# Patient Record
Sex: MALE | Race: WHITE | NOT HISPANIC OR LATINO | Employment: OTHER | URBAN - METROPOLITAN AREA
[De-identification: names, ages, dates, MRNs, and addresses within clinical notes are randomized per-mention and may not be internally consistent; named-entity substitution may affect disease eponyms.]

---

## 2018-02-08 ENCOUNTER — HOSPITAL ENCOUNTER (OUTPATIENT)
Dept: FAMILY MEDICINE CLINIC | Facility: CLINIC | Age: 48
Discharge: HOME | End: 2018-02-08
Attending: NURSE PRACTITIONER | Admitting: NURSE PRACTITIONER

## 2018-03-30 ENCOUNTER — HOSPITAL ENCOUNTER (OUTPATIENT)
Dept: FAMILY MEDICINE CLINIC | Facility: CLINIC | Age: 48
Discharge: HOME | End: 2018-03-30
Attending: NURSE PRACTITIONER | Admitting: NURSE PRACTITIONER

## 2018-05-18 ENCOUNTER — HOSPITAL ENCOUNTER (OUTPATIENT)
Dept: FAMILY MEDICINE CLINIC | Facility: CLINIC | Age: 48
Discharge: HOME | End: 2018-05-18
Attending: NURSE PRACTITIONER | Admitting: NURSE PRACTITIONER

## 2018-07-25 ENCOUNTER — HOSPITAL ENCOUNTER (OUTPATIENT)
Dept: SLEEP MEDICINE | Facility: HOSPITAL | Age: 48
Discharge: HOME | End: 2018-07-25
Attending: INTERNAL MEDICINE | Admitting: INTERNAL MEDICINE

## 2018-09-04 ENCOUNTER — HOSPITAL ENCOUNTER (OUTPATIENT)
Dept: FAMILY MEDICINE CLINIC | Facility: CLINIC | Age: 48
Discharge: HOME | End: 2018-09-04
Attending: NURSE PRACTITIONER | Admitting: NURSE PRACTITIONER

## 2019-06-24 RX ORDER — ROSUVASTATIN CALCIUM 20 MG/1
20 TABLET, COATED ORAL
Qty: 30 TABLET | Refills: 2 | Status: SHIPPED | OUTPATIENT
Start: 2019-06-24 | End: 2019-09-23 | Stop reason: SDUPTHER

## 2019-06-24 RX ORDER — ROSUVASTATIN CALCIUM 20 MG/1
1 TABLET, COATED ORAL
COMMUNITY
Start: 2018-08-07 | End: 2019-06-24 | Stop reason: SDUPTHER

## 2019-07-02 ENCOUNTER — OFFICE VISIT (OUTPATIENT)
Dept: PULMONOLOGY | Facility: HOSPITAL | Age: 49
End: 2019-07-02

## 2019-07-02 VITALS
HEIGHT: 73 IN | RESPIRATION RATE: 12 BRPM | DIASTOLIC BLOOD PRESSURE: 69 MMHG | BODY MASS INDEX: 41.75 KG/M2 | WEIGHT: 315 LBS | SYSTOLIC BLOOD PRESSURE: 111 MMHG | TEMPERATURE: 98.5 F | HEART RATE: 62 BPM | OXYGEN SATURATION: 93 %

## 2019-07-02 DIAGNOSIS — R06.00 DYSPNEA, UNSPECIFIED TYPE: Primary | ICD-10-CM

## 2019-07-02 DIAGNOSIS — G47.33 OSA (OBSTRUCTIVE SLEEP APNEA): ICD-10-CM

## 2019-07-02 DIAGNOSIS — Q24.5 CORONARY-MYOCARDIAL BRIDGE: ICD-10-CM

## 2019-07-02 PROCEDURE — G0463 HOSPITAL OUTPT CLINIC VISIT: HCPCS

## 2019-07-02 RX ORDER — ISOSORBIDE MONONITRATE 30 MG/1
30 TABLET, EXTENDED RELEASE ORAL EVERY MORNING
Refills: 5 | COMMUNITY
Start: 2019-06-07 | End: 2019-09-23 | Stop reason: SDUPTHER

## 2019-07-02 RX ORDER — COVID-19 ANTIGEN TEST
KIT MISCELLANEOUS
COMMUNITY
Start: 2018-03-20 | End: 2020-04-29

## 2019-07-02 RX ORDER — TRAZODONE HYDROCHLORIDE 50 MG/1
TABLET ORAL
Refills: 4 | COMMUNITY
Start: 2019-06-21 | End: 2019-09-23 | Stop reason: SDUPTHER

## 2019-07-02 RX ORDER — PROPRANOLOL HYDROCHLORIDE 80 MG/1
80 TABLET ORAL 2 TIMES DAILY
Refills: 2 | COMMUNITY
Start: 2019-06-21 | End: 2019-08-19 | Stop reason: SDUPTHER

## 2019-07-02 RX ORDER — CHOLECALCIFEROL (VITAMIN D3) 125 MCG
5 CAPSULE ORAL NIGHTLY PRN
COMMUNITY
End: 2019-09-23 | Stop reason: SDUPTHER

## 2019-07-02 RX ORDER — ALBUTEROL SULFATE 90 UG/1
AEROSOL, METERED RESPIRATORY (INHALATION)
Refills: 5 | COMMUNITY
Start: 2019-05-02 | End: 2019-12-16

## 2019-07-02 RX ORDER — NITROGLYCERIN 0.4 MG/1
TABLET SUBLINGUAL
COMMUNITY
Start: 2012-09-19 | End: 2021-01-06 | Stop reason: SDUPTHER

## 2019-07-02 NOTE — ASSESSMENT & PLAN NOTE
Diagnosed in 2018 via heart cath per Dr. Yang.  Medical management.  Pt with increased SOA and chest pain with decreased exercise tolerance and heart palpitations.  Would like cardiac clearance prior to PFT testing and advised need cardiac re-evaluation as it has been over a year since last visit.  Concern that cardiac issues are main source of dyspnea and less likely pulmonary related.

## 2019-07-02 NOTE — ASSESSMENT & PLAN NOTE
"Pt with known history of GUILLERMINA, currently untreated.    PSG 7/2018 with AHI of  39.6 with lowest oxygen level of 74%, sustained hypoxia <88% of 56.5 minutes.    Pt titrated and prescribed auto bipap.  Not wearing as he feels it caused increased sinus pressure and chest infections.      Discussed with patient untreated sleep apnea in combination with sustained hypoxia, could be cause of early am dyspnea and chest pain.      Pt request referral to ENT to \" fix sinus issues\" to better tolerate PAP therapy.   " No Yes

## 2019-07-02 NOTE — ASSESSMENT & PLAN NOTE
Likely multifactorial.  Pt with 10-20 year history of SOA.  Worse with exertion, and low exercise intolerance.  Discussed with patient myocardial bridge, obesity, untreated sleep apnea, sinus crush injury  could be contributing factors.  Will recheck CXR and refer to ENT for evaluation.
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
## 2019-07-03 ENCOUNTER — TELEPHONE (OUTPATIENT)
Dept: CARDIOLOGY | Facility: CLINIC | Age: 49
End: 2019-07-03

## 2019-07-03 NOTE — TELEPHONE ENCOUNTER
[facility] FOR CORONARY MYOCARDIAL BRIDGE. DR. QUIÑONES WOULD YOU BE WILLING TO SEE PATIENT IN OFFICE SOON?

## 2019-07-11 PROBLEM — Z13.29 SCREENING FOR THYROID DISORDER: Status: ACTIVE | Noted: 2018-02-08

## 2019-07-11 PROBLEM — R53.83 FATIGUE: Status: ACTIVE | Noted: 2018-04-24

## 2019-07-11 PROBLEM — J06.9 ACUTE UPPER RESPIRATORY INFECTION: Status: ACTIVE | Noted: 2018-03-30

## 2019-07-11 PROBLEM — Z12.5 ENCOUNTER FOR SCREENING FOR MALIGNANT NEOPLASM OF PROSTATE: Status: ACTIVE | Noted: 2018-02-08

## 2019-07-11 PROBLEM — H61.23 BILATERAL IMPACTED CERUMEN: Status: ACTIVE | Noted: 2019-02-22

## 2019-07-11 PROBLEM — E66.01 MORBID (SEVERE) OBESITY DUE TO EXCESS CALORIES (HCC): Status: ACTIVE | Noted: 2018-06-12

## 2019-07-11 PROBLEM — E66.9 OBESITY: Status: ACTIVE | Noted: 2019-07-11

## 2019-07-11 PROBLEM — Z72.89 OTHER PROBLEMS RELATED TO LIFESTYLE: Status: ACTIVE | Noted: 2018-04-24

## 2019-07-11 PROBLEM — M25.531 PAIN IN RIGHT WRIST: Status: ACTIVE | Noted: 2018-05-18

## 2019-07-11 PROBLEM — G47.00 INSOMNIA: Status: ACTIVE | Noted: 2018-04-24

## 2019-07-11 PROBLEM — E78.5 HYPERLIPIDEMIA: Status: ACTIVE | Noted: 2019-07-11

## 2019-07-11 PROBLEM — J30.9 ALLERGIC RHINITIS: Status: ACTIVE | Noted: 2018-11-06

## 2019-07-11 PROBLEM — IMO0002 HYPOGONADISM: Status: ACTIVE | Noted: 2018-06-13

## 2019-07-11 PROBLEM — R05.9 COUGH: Status: ACTIVE | Noted: 2018-09-04

## 2019-07-11 PROBLEM — R73.01 FASTING HYPERGLYCEMIA: Status: ACTIVE | Noted: 2018-02-08

## 2019-07-11 PROBLEM — H66.92 ACUTE LEFT OTITIS MEDIA: Status: ACTIVE | Noted: 2019-02-22

## 2019-07-16 ENCOUNTER — OFFICE VISIT (OUTPATIENT)
Dept: CARDIOLOGY | Facility: CLINIC | Age: 49
End: 2019-07-16

## 2019-07-16 VITALS
DIASTOLIC BLOOD PRESSURE: 79 MMHG | BODY MASS INDEX: 41.75 KG/M2 | SYSTOLIC BLOOD PRESSURE: 113 MMHG | WEIGHT: 315 LBS | HEIGHT: 73 IN

## 2019-07-16 DIAGNOSIS — I10 ESSENTIAL HYPERTENSION: ICD-10-CM

## 2019-07-16 DIAGNOSIS — I25.118 CORONARY ARTERY DISEASE OF NATIVE ARTERY OF NATIVE HEART WITH STABLE ANGINA PECTORIS (HCC): Primary | ICD-10-CM

## 2019-07-16 DIAGNOSIS — G47.33 OBSTRUCTIVE SLEEP APNEA SYNDROME: ICD-10-CM

## 2019-07-16 DIAGNOSIS — E78.00 PURE HYPERCHOLESTEROLEMIA: ICD-10-CM

## 2019-07-16 DIAGNOSIS — I20.8 STABLE ANGINA PECTORIS (HCC): ICD-10-CM

## 2019-07-16 PROCEDURE — 93000 ELECTROCARDIOGRAM COMPLETE: CPT | Performed by: INTERNAL MEDICINE

## 2019-07-16 PROCEDURE — 99213 OFFICE O/P EST LOW 20 MIN: CPT | Performed by: INTERNAL MEDICINE

## 2019-07-16 NOTE — PROGRESS NOTES
"    Subjective:     Encounter Date:07/16/2019      Patient ID: Ang Yoon is a 49 y.o. male.    Chief Complaint: FRANCIS & chest Pains  History of Present Illness 51-year-old white male with history of coronary disease hypertension hyperlipidemia and obstructive sleep apnea with pulmonary hypertension presents to my office for follow-up.  Patient is currently having occasional episodes of chest pain or shortness of breath especially with exertion and relieved with rest.  No complaints of any PND orthopnea.  No palpitations dizziness syncope.  He has some swelling of the feet.  He is taking his medicines regularly.  He is not able to exercise or ambulate very well because of his symptoms.  He does not use a CPAP machine.  Not smoke.    The following portions of the patient's history were reviewed and updated as appropriate: allergies, current medications, past family history, past medical history, past social history, past surgical history and problem list.  Past Medical History:   Diagnosis Date   • Arthritis    • CAD (coronary artery disease)    • Dyspnea on exertion 07/16/2019   • Hyperlipidemia    • Hypertension    • Obesity    • Sleep apnea, obstructive      Past Surgical History:   Procedure Laterality Date   • CARDIAC CATHETERIZATION  04/2018   • KNEE ARTHROSCOPY Right 07/09/2012     /79 (BP Location: Left arm, Patient Position: Sitting, Cuff Size: Adult)   Ht 185.4 cm (73\")   Wt (!) 164 kg (361 lb)   BMI 47.63 kg/m²   Family History   Problem Relation Age of Onset   • Heart disease Other    • Hypertension Other    • Stroke Other    • Prostate cancer Other    • Asthma Brother    • Asthma Daughter        Current Outpatient Medications:   •  albuterol sulfate  (90 Base) MCG/ACT inhaler, , Disp: , Rfl: 5  •  isosorbide mononitrate (IMDUR) 30 MG 24 hr tablet, Take 30 mg by mouth Every Morning., Disp: , Rfl: 5  •  melatonin 5 MG tablet tablet, Take 5 mg by mouth At Night As Needed., Disp: , " Rfl:   •  Naproxen Sodium (ALEVE) 220 MG capsule, ALEVE 220 MG CAPS, Disp: , Rfl:   •  nitroglycerin (NITROSTAT) 0.4 MG SL tablet, NITROSTAT 0.4 MG SUBL, Disp: , Rfl:   •  propranolol (INDERAL) 80 MG tablet, Take 80 mg by mouth 2 (Two) Times a Day., Disp: , Rfl: 2  •  rosuvastatin (CRESTOR) 20 MG tablet, Take 1 tablet by mouth every night at bedtime., Disp: 30 tablet, Rfl: 2  •  traZODone (DESYREL) 50 MG tablet, TAKE 1 TO 2 TABLETS BY MOUTH ONCE DAILY AT BEDTIME AS NEEDED, Disp: , Rfl: 4  Allergies   Allergen Reactions   • Niacin Rash     Social History     Socioeconomic History   • Marital status: Single     Spouse name: Not on file   • Number of children: Not on file   • Years of education: Not on file   • Highest education level: Not on file   Tobacco Use   • Smoking status: Former Smoker     Packs/day: 0.25     Types: Cigarettes   • Smokeless tobacco: Never Used   Substance and Sexual Activity   • Alcohol use: No     Frequency: Never   • Drug use: No   • Sexual activity: Defer     Review of Systems   Constitution: Positive for malaise/fatigue. Negative for fever.   HENT: Negative for congestion and hearing loss.    Eyes: Negative for double vision and visual disturbance.   Cardiovascular: Positive for chest pain and dyspnea on exertion. Negative for claudication, leg swelling (Right mainly), palpitations and syncope.   Respiratory: Negative for cough and shortness of breath.    Endocrine: Negative for cold intolerance.   Skin: Negative for color change and rash.   Musculoskeletal: Negative for arthritis and joint pain.   Gastrointestinal: Negative for abdominal pain and heartburn.   Genitourinary: Negative for hematuria.   Neurological: Negative for excessive daytime sleepiness, dizziness and light-headedness.   Psychiatric/Behavioral: Negative for depression. The patient is not nervous/anxious.    All other systems reviewed and are negative.             Objective:     Physical Exam   Constitutional: He appears  well-developed and well-nourished.   HENT:   Head: Normocephalic and atraumatic.   Eyes: Conjunctivae and EOM are normal. Pupils are equal, round, and reactive to light. No scleral icterus.   Neck: Normal range of motion. Neck supple. No JVD present. Carotid bruit is not present.   Cardiovascular: Normal rate, regular rhythm, S1 normal, S2 normal, normal heart sounds and intact distal pulses. PMI is not displaced.   Pulmonary/Chest: Effort normal and breath sounds normal. He has no wheezes. He has no rales.   Abdominal: Soft. Bowel sounds are normal.   Musculoskeletal: Normal range of motion.   Neurological: He is alert. He has normal strength.   No focal deficits   Skin: Skin is warm and dry. No rash noted.   Psychiatric: He has a normal mood and affect.       ECG 12 Lead  Date/Time: 7/16/2019 11:26 AM  Performed by: Sher Yang MD  Authorized by: Sher Yang MD   Comments: With nonspecific ST segment normality and old inferior infarct            Lab Review:       Assessment:          Diagnosis Plan   1. Coronary artery disease of native artery of native heart with stable angina pectoris (CMS/HCC)     2. Essential hypertension     3. Pure hypercholesterolemia     4. Obstructive sleep apnea syndrome     5. Stable angina pectoris (CMS/HCC)            Plan:       Patient is having symptoms of chest pain or shortness of breath and hence I will perform an echocardiogram and stress my study to rule out ischemia.  Patient has sleep apnea but does not use a CPAP machine.  Blood pressure and heart rate are stable per  Patient's lipid levels are followed by the primary care doctor.  Discussed with patient about treatment plan

## 2019-08-05 ENCOUNTER — TELEPHONE (OUTPATIENT)
Dept: CARDIOLOGY | Facility: CLINIC | Age: 49
End: 2019-08-05

## 2019-08-05 DIAGNOSIS — R06.02 SHORTNESS OF BREATH: ICD-10-CM

## 2019-08-05 DIAGNOSIS — R07.9 CHEST PAIN, UNSPECIFIED TYPE: Primary | ICD-10-CM

## 2019-08-05 NOTE — TELEPHONE ENCOUNTER
Pt was originally scheduled for maddie and echo in [place] tomorrow but called the  office to make appt there due to owing [facility] money. Order placed.

## 2019-08-15 ENCOUNTER — HOSPITAL ENCOUNTER (OUTPATIENT)
Dept: CARDIOLOGY | Facility: HOSPITAL | Age: 49
Discharge: HOME | End: 2019-08-15

## 2019-08-15 ENCOUNTER — APPOINTMENT (OUTPATIENT)
Dept: CARDIOLOGY | Facility: HOSPITAL | Age: 49
End: 2019-08-15

## 2019-08-15 DIAGNOSIS — R06.02 SHORTNESS OF BREATH: ICD-10-CM

## 2019-08-15 DIAGNOSIS — R07.9 CHEST PAIN, UNSPECIFIED TYPE: ICD-10-CM

## 2019-08-15 LAB
BH CV STRESS COMMENTS STAGE 1: NORMAL
BH CV STRESS DOSE REGADENOSON STAGE 1: 0.4
BH CV STRESS DURATION MIN STAGE 1: 0
BH CV STRESS DURATION SEC STAGE 1: 10
BH CV STRESS PROTOCOL 1: NORMAL
BH CV STRESS RECOVERY BP: NORMAL MMHG
BH CV STRESS RECOVERY HR: 72 BPM
BH CV STRESS STAGE 1: 1
LV EF NUC BP: 51 %
MAXIMAL PREDICTED HEART RATE: 170 BPM
STRESS BASELINE BP: NORMAL MMHG
STRESS BASELINE HR: 61 BPM
STRESS TARGET HR: 145 BPM

## 2019-08-15 PROCEDURE — 0 TECHNETIUM SESTAMIBI: Performed by: INTERNAL MEDICINE

## 2019-08-15 PROCEDURE — 93018 CV STRESS TEST I&R ONLY: CPT | Performed by: INTERNAL MEDICINE

## 2019-08-15 PROCEDURE — A9500 TC99M SESTAMIBI: HCPCS | Performed by: INTERNAL MEDICINE

## 2019-08-15 PROCEDURE — 93017 CV STRESS TEST TRACING ONLY: CPT

## 2019-08-15 PROCEDURE — 78452 HT MUSCLE IMAGE SPECT MULT: CPT | Performed by: INTERNAL MEDICINE

## 2019-08-15 PROCEDURE — 78452 HT MUSCLE IMAGE SPECT MULT: CPT

## 2019-08-15 PROCEDURE — 25010000002 REGADENOSON 0.4 MG/5ML SOLUTION: Performed by: INTERNAL MEDICINE

## 2019-08-15 PROCEDURE — 93016 CV STRESS TEST SUPVJ ONLY: CPT | Performed by: INTERNAL MEDICINE

## 2019-08-15 RX ADMIN — TECHNETIUM TC 99M SESTAMIBI 1 DOSE: 1 INJECTION INTRAVENOUS at 12:00

## 2019-08-15 RX ADMIN — REGADENOSON 0.4 MG: 0.08 INJECTION, SOLUTION INTRAVENOUS at 12:00

## 2019-08-15 RX ADMIN — TECHNETIUM TC 99M SESTAMIBI 1 DOSE: 1 INJECTION INTRAVENOUS at 09:30

## 2019-08-19 RX ORDER — PROPRANOLOL HYDROCHLORIDE 80 MG/1
80 TABLET ORAL 2 TIMES DAILY
Qty: 60 TABLET | Refills: 5 | Status: SHIPPED | OUTPATIENT
Start: 2019-08-19 | End: 2019-09-23 | Stop reason: SDUPTHER

## 2019-08-27 ENCOUNTER — TELEPHONE (OUTPATIENT)
Dept: CARDIOLOGY | Facility: CLINIC | Age: 49
End: 2019-08-27

## 2019-08-27 NOTE — TELEPHONE ENCOUNTER
----- Message from Blayne Agrawal Rep sent at 8/23/2019 11:00 AM EDT -----  Regarding: ECHO ORDER  There is an open order for an echo on this patient still. In the notes Slime galeas wanted his CHARLA done first before approving an echo. He had his CHARLA done the 15th. Based on the results of it does Celia want her to submit those to adal to try to get the echo done to or can he discontinue the order for the echo?

## 2019-09-23 ENCOUNTER — OFFICE VISIT (OUTPATIENT)
Dept: FAMILY MEDICINE CLINIC | Facility: CLINIC | Age: 49
End: 2019-09-23

## 2019-09-23 VITALS
OXYGEN SATURATION: 94 % | HEART RATE: 73 BPM | WEIGHT: 315 LBS | BODY MASS INDEX: 42.66 KG/M2 | HEIGHT: 72 IN | TEMPERATURE: 98 F | DIASTOLIC BLOOD PRESSURE: 72 MMHG | SYSTOLIC BLOOD PRESSURE: 115 MMHG

## 2019-09-23 DIAGNOSIS — M54.32 SCIATICA OF LEFT SIDE: ICD-10-CM

## 2019-09-23 DIAGNOSIS — E78.2 MIXED HYPERLIPIDEMIA: Primary | ICD-10-CM

## 2019-09-23 DIAGNOSIS — Z00.00 PREVENTATIVE HEALTH CARE: ICD-10-CM

## 2019-09-23 DIAGNOSIS — M54.50 LUMBAR PAIN: ICD-10-CM

## 2019-09-23 DIAGNOSIS — M25.552 LEFT HIP PAIN: ICD-10-CM

## 2019-09-23 DIAGNOSIS — R25.2 LEG CRAMPS: ICD-10-CM

## 2019-09-23 PROCEDURE — 99214 OFFICE O/P EST MOD 30 MIN: CPT | Performed by: NURSE PRACTITIONER

## 2019-09-23 RX ORDER — ISOSORBIDE MONONITRATE 30 MG/1
30 TABLET, EXTENDED RELEASE ORAL EVERY MORNING
Qty: 90 TABLET | Refills: 2 | Status: SHIPPED | OUTPATIENT
Start: 2019-09-23 | End: 2020-07-06

## 2019-09-23 RX ORDER — CYCLOBENZAPRINE HCL 10 MG
10 TABLET ORAL NIGHTLY PRN
Qty: 30 TABLET | Refills: 2 | Status: SHIPPED | OUTPATIENT
Start: 2019-09-23 | End: 2019-12-16

## 2019-09-23 RX ORDER — PREDNISONE 20 MG/1
TABLET ORAL
Qty: 20 TABLET | Refills: 0 | Status: SHIPPED | OUTPATIENT
Start: 2019-09-23 | End: 2019-10-01

## 2019-09-23 RX ORDER — TRAZODONE HYDROCHLORIDE 50 MG/1
TABLET ORAL
Qty: 180 TABLET | Refills: 2 | Status: SHIPPED | OUTPATIENT
Start: 2019-09-23 | End: 2020-12-23

## 2019-09-23 RX ORDER — ASPIRIN 81 MG/1
81 TABLET ORAL DAILY
Qty: 90 TABLET | Refills: 2 | Status: SHIPPED | OUTPATIENT
Start: 2019-09-23 | End: 2020-12-23

## 2019-09-23 RX ORDER — CHOLECALCIFEROL (VITAMIN D3) 125 MCG
5 CAPSULE ORAL NIGHTLY PRN
Qty: 90 TABLET | Refills: 2 | Status: SHIPPED | OUTPATIENT
Start: 2019-09-23

## 2019-09-23 RX ORDER — PROPRANOLOL HYDROCHLORIDE 80 MG/1
80 TABLET ORAL 2 TIMES DAILY
Qty: 180 TABLET | Refills: 2 | Status: SHIPPED | OUTPATIENT
Start: 2019-09-23 | End: 2020-10-26

## 2019-09-23 RX ORDER — ASPIRIN 81 MG/1
81 TABLET ORAL DAILY
COMMUNITY
End: 2019-09-23 | Stop reason: SDUPTHER

## 2019-09-23 RX ORDER — ROSUVASTATIN CALCIUM 20 MG/1
20 TABLET, COATED ORAL
Qty: 30 TABLET | Refills: 2 | Status: SHIPPED | OUTPATIENT
Start: 2019-09-23 | End: 2020-01-02

## 2019-09-23 RX ORDER — GABAPENTIN 300 MG/1
CAPSULE ORAL
Qty: 60 CAPSULE | Refills: 2 | Status: SHIPPED | OUTPATIENT
Start: 2019-09-23 | End: 2020-06-10 | Stop reason: SDUPTHER

## 2019-09-23 NOTE — PROGRESS NOTES
Subjective   Ang Yoon is a 49 y.o. male.     49-year-old obese white male history hypertension, CABG/CAD, myocardial bridging syndrome, sleep apnea, hyperlipidemia, insomnia, headaches, anxiety and chronic knee pain comes in today 3-week history of low back pain that is now moving the left hip and to left calf after doing some lifting.  Patient will start inflammatory muscle relaxers and getting some x-rays to see if we can get this cleared up without therapy.   patient had recent cardiac studies done with his cardiologist for chest pain   blood pressure today 114/72 heart rate 72 he denies any chest pain, dyspnea, tachycardia, dizziness or edema   weight is 365     lumbar/left hip x-rays   magnesium 400 mg twice a day   prednisone 20 mg taper dose   Flexeril 10 mg three times a day   Aleve twice a day x1 week   Ice frequently and stretching exercises         The following portions of the patient's history were reviewed and updated as appropriate: allergies, current medications, past family history, past medical history, past social history, past surgical history and problem list.    Review of Systems   HENT: Negative.    Respiratory: Negative.    Cardiovascular: Negative.    Gastrointestinal: Negative.    Genitourinary: Negative.    Musculoskeletal: Positive for back pain.        Low back pain that has now moved into left hip and now he has left calf cramping   Psychiatric/Behavioral: Negative.        Objective   Physical Exam   Constitutional: He is oriented to person, place, and time. He appears well-developed and well-nourished.   Cardiovascular: Normal rate and regular rhythm.   Pulmonary/Chest: Effort normal.   Abdominal: Soft.   Musculoskeletal:   Left-sided low back pain after lifting with  Radiculopathy in left hip and left calf   Neurological: He is oriented to person, place, and time.   Skin: Skin is warm and dry.   Psychiatric: He has a normal mood and affect.         Assessment/Plan   Ang  was seen today for leg pain and hip pain.    Diagnoses and all orders for this visit:    Mixed hyperlipidemia  -     Lipid Panel With LDL / HDL Ratio    Preventative health care  -     CBC (No Diff)  -     Comprehensive Metabolic Panel    Left hip pain  -     XR Hip With or Without Pelvis 2 - 3 View Left    Lumbar pain  -     XR Spine Lumbar 2 or 3 View    Sciatica of left side    Leg cramps    Other orders  -     cyclobenzaprine (FLEXERIL) 10 MG tablet; Take 1 tablet by mouth At Night As Needed for Muscle Spasms.  -     predniSONE (DELTASONE) 20 MG tablet; Take 4 tablets by mouth Daily for 2 days, THEN 3 tablets Daily for 2 days, THEN 2 tablets Daily for 2 days, THEN 1 tablet Daily for 2 days.  -     aspirin 81 MG EC tablet; Take 1 tablet by mouth Daily.  -     isosorbide mononitrate (IMDUR) 30 MG 24 hr tablet; Take 1 tablet by mouth Every Morning.  -     melatonin 5 MG tablet tablet; Take 1 tablet by mouth At Night As Needed (insomnia).  -     propranolol (INDERAL) 80 MG tablet; Take 1 tablet by mouth 2 (Two) Times a Day.  -     rosuvastatin (CRESTOR) 20 MG tablet; Take 1 tablet by mouth every night at bedtime.  -     traZODone (DESYREL) 50 MG tablet; 1-2 tabs qhs prn  -     gabapentin (NEURONTIN) 300 MG capsule; 1-2 capsules 30 minutes before bedtime

## 2019-09-25 LAB
ALBUMIN SERPL-MCNC: 4.3 G/DL (ref 3.5–5.5)
ALBUMIN/GLOB SERPL: 1.8 {RATIO} (ref 1.2–2.2)
ALP SERPL-CCNC: 64 IU/L (ref 39–117)
ALT SERPL-CCNC: 21 IU/L (ref 0–44)
AST SERPL-CCNC: 25 IU/L (ref 0–40)
BILIRUB SERPL-MCNC: 0.6 MG/DL (ref 0–1.2)
BUN SERPL-MCNC: 16 MG/DL (ref 6–24)
BUN/CREAT SERPL: 20 (ref 9–20)
CALCIUM SERPL-MCNC: 9.2 MG/DL (ref 8.7–10.2)
CHLORIDE SERPL-SCNC: 102 MMOL/L (ref 96–106)
CHOLEST SERPL-MCNC: 129 MG/DL (ref 100–199)
CO2 SERPL-SCNC: 26 MMOL/L (ref 20–29)
CREAT SERPL-MCNC: 0.82 MG/DL (ref 0.76–1.27)
ERYTHROCYTE [DISTWIDTH] IN BLOOD BY AUTOMATED COUNT: 14 % (ref 12.3–15.4)
GLOBULIN SER CALC-MCNC: 2.4 G/DL (ref 1.5–4.5)
GLUCOSE SERPL-MCNC: 122 MG/DL (ref 65–99)
HCT VFR BLD AUTO: 46.7 % (ref 37.5–51)
HDLC SERPL-MCNC: 39 MG/DL
HGB BLD-MCNC: 15.2 G/DL (ref 13–17.7)
LDLC SERPL CALC-MCNC: 49 MG/DL (ref 0–99)
LDLC/HDLC SERPL: 1.3 RATIO (ref 0–3.6)
MCH RBC QN AUTO: 31.2 PG (ref 26.6–33)
MCHC RBC AUTO-ENTMCNC: 32.5 G/DL (ref 31.5–35.7)
MCV RBC AUTO: 96 FL (ref 79–97)
PLATELET # BLD AUTO: 182 X10E3/UL (ref 150–450)
POTASSIUM SERPL-SCNC: 5.2 MMOL/L (ref 3.5–5.2)
PROT SERPL-MCNC: 6.7 G/DL (ref 6–8.5)
RBC # BLD AUTO: 4.87 X10E6/UL (ref 4.14–5.8)
SODIUM SERPL-SCNC: 141 MMOL/L (ref 134–144)
TRIGL SERPL-MCNC: 207 MG/DL (ref 0–149)
VLDLC SERPL CALC-MCNC: 41 MG/DL (ref 5–40)
WBC # BLD AUTO: 7.1 X10E3/UL (ref 3.4–10.8)

## 2019-10-08 ENCOUNTER — TELEPHONE (OUTPATIENT)
Dept: CARDIOLOGY | Facility: CLINIC | Age: 49
End: 2019-10-08

## 2019-10-23 ENCOUNTER — OFFICE VISIT (OUTPATIENT)
Dept: FAMILY MEDICINE CLINIC | Facility: CLINIC | Age: 49
End: 2019-10-23

## 2019-10-23 VITALS
HEIGHT: 72 IN | BODY MASS INDEX: 42.66 KG/M2 | DIASTOLIC BLOOD PRESSURE: 81 MMHG | HEART RATE: 65 BPM | SYSTOLIC BLOOD PRESSURE: 129 MMHG | OXYGEN SATURATION: 93 % | RESPIRATION RATE: 22 BRPM | TEMPERATURE: 98.6 F | WEIGHT: 315 LBS

## 2019-10-23 DIAGNOSIS — J20.9 ACUTE BRONCHITIS, UNSPECIFIED ORGANISM: Primary | ICD-10-CM

## 2019-10-23 PROCEDURE — 99213 OFFICE O/P EST LOW 20 MIN: CPT | Performed by: FAMILY MEDICINE

## 2019-10-23 RX ORDER — GUAIFENESIN 600 MG/1
600 TABLET, EXTENDED RELEASE ORAL 2 TIMES DAILY
Qty: 14 TABLET | Refills: 0 | Status: SHIPPED | OUTPATIENT
Start: 2019-10-23 | End: 2019-12-16

## 2019-10-23 RX ORDER — LEVOFLOXACIN 500 MG/1
500 TABLET, FILM COATED ORAL DAILY
Qty: 7 TABLET | Refills: 0 | Status: SHIPPED | OUTPATIENT
Start: 2019-10-23 | End: 2019-11-01

## 2019-10-23 RX ORDER — PREDNISONE 20 MG/1
40 TABLET ORAL DAILY
Qty: 10 TABLET | Refills: 0 | Status: SHIPPED | OUTPATIENT
Start: 2019-10-23 | End: 2019-10-28

## 2019-10-30 ENCOUNTER — TELEPHONE (OUTPATIENT)
Dept: FAMILY MEDICINE CLINIC | Facility: CLINIC | Age: 49
End: 2019-10-30

## 2019-10-30 DIAGNOSIS — R06.02 SHORTNESS OF BREATH: Primary | ICD-10-CM

## 2019-10-30 DIAGNOSIS — R05.9 COUGH: ICD-10-CM

## 2019-10-30 NOTE — TELEPHONE ENCOUNTER
Patient called into office and left a voicemail stating that he has finished the Prednisone as well as the ABTx and the Bronchitis seems to be coming back even stronger than before and wants to know what you would for him to do. Thanks.

## 2019-10-30 NOTE — TELEPHONE ENCOUNTER
Patient notified. Understanding voiced. Will go to [facility].

## 2019-10-30 NOTE — TELEPHONE ENCOUNTER
Please send in order for anterior chest x-ray PA and lateral.  Diagnosis is cough.  He can do this at [place] or [facility], his choice.  Thanks

## 2019-10-31 ENCOUNTER — TELEPHONE (OUTPATIENT)
Dept: FAMILY MEDICINE CLINIC | Facility: CLINIC | Age: 49
End: 2019-10-31

## 2019-10-31 ENCOUNTER — HOSPITAL ENCOUNTER (OUTPATIENT)
Dept: GENERAL RADIOLOGY | Facility: HOSPITAL | Age: 49
Discharge: HOME | End: 2019-10-31
Admitting: FAMILY MEDICINE

## 2019-10-31 DIAGNOSIS — R06.02 SHORTNESS OF BREATH: ICD-10-CM

## 2019-10-31 DIAGNOSIS — R05.9 COUGH: ICD-10-CM

## 2019-10-31 PROCEDURE — 71046 X-RAY EXAM CHEST 2 VIEWS: CPT

## 2019-10-31 NOTE — TELEPHONE ENCOUNTER
S/w patient again at this time. Reminded patient that Dr. CAMILO Bass is out of office and that he needs to go to UC or ER if he feels that it is worthy. Patient voiced understanding and stated that he would decide within the next few hours.

## 2019-10-31 NOTE — TELEPHONE ENCOUNTER
Patient called said he went and got an xray that  had ordered, Patient said he is starting to cough up blood in the mornings said its bright red- he also said this goes away within a few hours but the coughing is still happening. He is wanting to know he should do please contact pt

## 2019-10-31 NOTE — TELEPHONE ENCOUNTER
----- Message from Traci Bass MD sent at 10/31/2019 12:48 PM EDT -----  Please tell patient that his chest x-ray was normal.  There is no pneumonia.  Since he is still feeling badly, asked him if he can come in tomorrow for reevaluation.  If hhe can, get him on the schedule and go ahead and do an influenza swab when he comes in.  Thanks

## 2019-10-31 NOTE — TELEPHONE ENCOUNTER
Called & s/w patient. Notified him of the XR results and is willing to come into office tomorrow for re-evaluation. Transferred to  for scheduling.

## 2019-11-01 ENCOUNTER — OFFICE VISIT (OUTPATIENT)
Dept: FAMILY MEDICINE CLINIC | Facility: CLINIC | Age: 49
End: 2019-11-01

## 2019-11-01 VITALS
WEIGHT: 315 LBS | TEMPERATURE: 97.4 F | DIASTOLIC BLOOD PRESSURE: 78 MMHG | SYSTOLIC BLOOD PRESSURE: 131 MMHG | HEIGHT: 72 IN | BODY MASS INDEX: 42.66 KG/M2 | RESPIRATION RATE: 22 BRPM

## 2019-11-01 DIAGNOSIS — R06.02 SHORTNESS OF BREATH: ICD-10-CM

## 2019-11-01 DIAGNOSIS — J45.41 MODERATE PERSISTENT REACTIVE AIRWAY DISEASE WITH ACUTE EXACERBATION: ICD-10-CM

## 2019-11-01 DIAGNOSIS — R05.9 COUGH: Primary | ICD-10-CM

## 2019-11-01 PROBLEM — J45.901 REACTIVE AIRWAY DISEASE WITH ACUTE EXACERBATION: Status: ACTIVE | Noted: 2019-11-01

## 2019-11-01 LAB
EXPIRATION DATE: NORMAL
FLUAV AG NPH QL: NEGATIVE
FLUBV AG NPH QL: NEGATIVE
INTERNAL CONTROL: NORMAL
Lab: NORMAL

## 2019-11-01 PROCEDURE — 87804 INFLUENZA ASSAY W/OPTIC: CPT | Performed by: FAMILY MEDICINE

## 2019-11-01 PROCEDURE — 99214 OFFICE O/P EST MOD 30 MIN: CPT | Performed by: FAMILY MEDICINE

## 2019-11-01 RX ORDER — ALBUTEROL SULFATE 90 UG/1
2 AEROSOL, METERED RESPIRATORY (INHALATION) EVERY 4 HOURS PRN
Qty: 1 INHALER | Refills: 2 | Status: SHIPPED | OUTPATIENT
Start: 2019-11-01 | End: 2020-12-23

## 2019-11-01 RX ORDER — FLUTICASONE PROPIONATE 50 MCG
2 SPRAY, SUSPENSION (ML) NASAL DAILY
Qty: 1 BOTTLE | Refills: 2 | Status: SHIPPED | OUTPATIENT
Start: 2019-11-01 | End: 2020-03-23 | Stop reason: SDUPTHER
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
## 2019-11-19 ENCOUNTER — HOSPITAL ENCOUNTER (OUTPATIENT)
Dept: RESPIRATORY THERAPY | Facility: HOSPITAL | Age: 49
End: 2019-11-19

## 2019-12-05 ENCOUNTER — HOSPITAL ENCOUNTER (OUTPATIENT)
Dept: RESPIRATORY THERAPY | Facility: HOSPITAL | Age: 49
Discharge: HOME | End: 2019-12-05
Admitting: FAMILY MEDICINE

## 2019-12-05 DIAGNOSIS — J45.41 MODERATE PERSISTENT REACTIVE AIRWAY DISEASE WITH ACUTE EXACERBATION: ICD-10-CM

## 2019-12-05 DIAGNOSIS — R06.02 SHORTNESS OF BREATH: ICD-10-CM

## 2019-12-05 PROCEDURE — 94060 EVALUATION OF WHEEZING: CPT

## 2019-12-05 PROCEDURE — 94726 PLETHYSMOGRAPHY LUNG VOLUMES: CPT

## 2019-12-05 PROCEDURE — 94729 DIFFUSING CAPACITY: CPT

## 2019-12-05 RX ORDER — ALBUTEROL SULFATE 2.5 MG/3ML
2.5 SOLUTION RESPIRATORY (INHALATION) ONCE
Status: COMPLETED | OUTPATIENT
Start: 2019-12-05 | End: 2019-12-05

## 2019-12-05 RX ADMIN — ALBUTEROL SULFATE 2.5 MG: 2.5 SOLUTION RESPIRATORY (INHALATION) at 13:29

## 2019-12-16 ENCOUNTER — OFFICE VISIT (OUTPATIENT)
Dept: FAMILY MEDICINE CLINIC | Facility: CLINIC | Age: 49
End: 2019-12-16

## 2019-12-16 VITALS
WEIGHT: 315 LBS | TEMPERATURE: 98.4 F | HEART RATE: 65 BPM | RESPIRATION RATE: 20 BRPM | OXYGEN SATURATION: 95 % | SYSTOLIC BLOOD PRESSURE: 128 MMHG | DIASTOLIC BLOOD PRESSURE: 85 MMHG | HEIGHT: 72 IN | BODY MASS INDEX: 42.66 KG/M2

## 2019-12-16 DIAGNOSIS — Z23 NEED FOR PROPHYLACTIC VACCINATION AND INOCULATION AGAINST INFLUENZA: ICD-10-CM

## 2019-12-16 DIAGNOSIS — S39.012S BACK STRAIN, SEQUELA: ICD-10-CM

## 2019-12-16 DIAGNOSIS — R05.9 COUGH: ICD-10-CM

## 2019-12-16 DIAGNOSIS — I51.9 CARDIAC DISEASE: ICD-10-CM

## 2019-12-16 DIAGNOSIS — J45.41 MODERATE PERSISTENT REACTIVE AIRWAY DISEASE WITH ACUTE EXACERBATION: Primary | ICD-10-CM

## 2019-12-16 DIAGNOSIS — Q24.5 CORONARY-MYOCARDIAL BRIDGE: ICD-10-CM

## 2019-12-16 PROBLEM — Z13.29 SCREENING FOR THYROID DISORDER: Status: RESOLVED | Noted: 2018-02-08 | Resolved: 2019-12-16

## 2019-12-16 PROBLEM — Z12.5 ENCOUNTER FOR SCREENING FOR MALIGNANT NEOPLASM OF PROSTATE: Status: RESOLVED | Noted: 2018-02-08 | Resolved: 2019-12-16

## 2019-12-16 PROBLEM — H66.92 ACUTE LEFT OTITIS MEDIA: Status: RESOLVED | Noted: 2019-02-22 | Resolved: 2019-12-16

## 2019-12-16 PROBLEM — J06.9 ACUTE UPPER RESPIRATORY INFECTION: Status: RESOLVED | Noted: 2018-03-30 | Resolved: 2019-12-16

## 2019-12-16 PROBLEM — G47.00 INSOMNIA: Status: RESOLVED | Noted: 2018-04-24 | Resolved: 2019-12-16

## 2019-12-16 PROBLEM — R53.83 FATIGUE: Status: RESOLVED | Noted: 2018-04-24 | Resolved: 2019-12-16

## 2019-12-16 PROBLEM — R73.01 FASTING HYPERGLYCEMIA: Status: RESOLVED | Noted: 2018-02-08 | Resolved: 2019-12-16

## 2019-12-16 PROBLEM — J30.9 ALLERGIC RHINITIS: Status: RESOLVED | Noted: 2018-11-06 | Resolved: 2019-12-16

## 2019-12-16 PROBLEM — H61.23 BILATERAL IMPACTED CERUMEN: Status: RESOLVED | Noted: 2019-02-22 | Resolved: 2019-12-16

## 2019-12-16 PROBLEM — M25.531 PAIN IN RIGHT WRIST: Status: RESOLVED | Noted: 2018-05-18 | Resolved: 2019-12-16

## 2019-12-16 PROCEDURE — 90674 CCIIV4 VAC NO PRSV 0.5 ML IM: CPT | Performed by: FAMILY MEDICINE

## 2019-12-16 PROCEDURE — 90471 IMMUNIZATION ADMIN: CPT | Performed by: FAMILY MEDICINE

## 2019-12-16 PROCEDURE — 99214 OFFICE O/P EST MOD 30 MIN: CPT | Performed by: FAMILY MEDICINE
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
## 2020-01-01 DIAGNOSIS — E78.2 MIXED HYPERLIPIDEMIA: Primary | ICD-10-CM

## 2020-01-02 RX ORDER — ROSUVASTATIN CALCIUM 20 MG/1
TABLET, COATED ORAL
Qty: 30 TABLET | Refills: 5 | Status: SHIPPED | OUTPATIENT
Start: 2020-01-02 | End: 2020-07-20

## 2020-01-28 ENCOUNTER — CLINICAL SUPPORT (OUTPATIENT)
Dept: FAMILY MEDICINE CLINIC | Facility: CLINIC | Age: 50
End: 2020-01-28

## 2020-01-28 DIAGNOSIS — Z23 NEED FOR DIPHTHERIA-TETANUS-PERTUSSIS (TDAP) VACCINE: Primary | ICD-10-CM

## 2020-01-28 PROCEDURE — 90471 IMMUNIZATION ADMIN: CPT | Performed by: FAMILY MEDICINE

## 2020-01-28 PROCEDURE — 90715 TDAP VACCINE 7 YRS/> IM: CPT | Performed by: FAMILY MEDICINE

## 2020-03-23 DIAGNOSIS — J45.41 MODERATE PERSISTENT REACTIVE AIRWAY DISEASE WITH ACUTE EXACERBATION: ICD-10-CM

## 2020-03-23 DIAGNOSIS — R06.02 SHORTNESS OF BREATH: ICD-10-CM

## 2020-03-23 RX ORDER — FLUTICASONE PROPIONATE 50 MCG
2 SPRAY, SUSPENSION (ML) NASAL DAILY
Qty: 1 BOTTLE | Refills: 2 | Status: SHIPPED | OUTPATIENT
Start: 2020-03-23 | End: 2022-07-07 | Stop reason: SDUPTHER

## 2020-04-29 ENCOUNTER — TELEMEDICINE (OUTPATIENT)
Dept: FAMILY MEDICINE CLINIC | Facility: CLINIC | Age: 50
End: 2020-04-29

## 2020-04-29 DIAGNOSIS — M54.42 CHRONIC MIDLINE LOW BACK PAIN WITH BILATERAL SCIATICA: Primary | ICD-10-CM

## 2020-04-29 DIAGNOSIS — M13.0 POLYARTHROPATHY OR POLYARTHRITIS OF MULTIPLE SITES: ICD-10-CM

## 2020-04-29 DIAGNOSIS — M51.36 LUMBAR DEGENERATIVE DISC DISEASE: ICD-10-CM

## 2020-04-29 DIAGNOSIS — G89.29 CHRONIC MIDLINE LOW BACK PAIN WITH BILATERAL SCIATICA: Primary | ICD-10-CM

## 2020-04-29 DIAGNOSIS — M54.41 CHRONIC MIDLINE LOW BACK PAIN WITH BILATERAL SCIATICA: Primary | ICD-10-CM

## 2020-04-29 PROBLEM — M54.50 CHRONIC MIDLINE LOW BACK PAIN: Status: ACTIVE | Noted: 2020-04-29

## 2020-04-29 PROBLEM — M51.369 LUMBAR DEGENERATIVE DISC DISEASE: Status: ACTIVE | Noted: 2020-04-29

## 2020-04-29 PROCEDURE — 99214 OFFICE O/P EST MOD 30 MIN: CPT | Performed by: FAMILY MEDICINE

## 2020-04-29 RX ORDER — HYDROCODONE BITARTRATE AND ACETAMINOPHEN 7.5; 325 MG/1; MG/1
1 TABLET ORAL EVERY 6 HOURS PRN
Qty: 42 TABLET | Refills: 0 | Status: SHIPPED | OUTPATIENT
Start: 2020-04-29 | End: 2020-05-12 | Stop reason: SDUPTHER

## 2020-04-29 RX ORDER — PREDNISONE 20 MG/1
40 TABLET ORAL DAILY
Qty: 10 TABLET | Refills: 0 | Status: SHIPPED | OUTPATIENT
Start: 2020-04-29 | End: 2020-05-19

## 2020-04-29 NOTE — PROGRESS NOTES
Subjective   Ang Yoon is a 49 y.o. male.   Chief Complaint   Patient presents with   • Back Pain       History of Present Illness     Unable to complete visit using a video connection to the patient. A phone visit was used to complete this visits. Total time of discussion was 18 minutes.        Evaluated today via phone after video visit was unable to be completed due to complaints of back pain and indeed general pain all over his body..  He explains that he had a back injury not quite 1 year ago, but that largely he had recovered from that.  He had x-rays of his lumbar spine in September 2019.  There were mild degenerative changes throughout the spine with obvious facet disease, most pronounced at L5-S1.    He states he must have done something 5 to 6 days ago as he had a sudden worsening of his low back to mid back pain.  He has been unable to work for the past 3 to 4 days.  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  He did not think he would be able to do that.  On 2 occasions the pain is radiated down into both legs and he described this a numbness or heaviness to his legs.  Each time lasted just a few minutes.  He has had no loss of bowel or bladder control.  He is able to get comfortable but the pain exacerbates when he starts to move again.  He bought a new mattress yesterday hoping this would help.  He describes the pain is radiating from his lower back all the way up to between his shoulder blades.  He also describes an achiness and pain in both wrists and shoulders.  He states he is taken maximal dose ibuprofen, Tylenol and neither helped.  He took some old Flexeril and that caused his mood to deteriorate so much so that his family left the house for a few hours.  He has not taken any opioid pain medication since he had a knee replacement but remembers doing okay with those.      Patient Active Problem List    Diagnosis Date Noted   • Lumbar degenerative disc  disease 04/29/2020   • Chronic midline low back pain with bilateral sciatica 04/29/2020   • Reactive airway disease with acute exacerbation 11/01/2019   • Hyperlipidemia 07/11/2019   • Obesity 07/11/2019   • Dyspnea 07/02/2019   • GUILLERMINA (obstructive sleep apnea) 07/02/2019   • 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   • Hypogonadism 06/13/2018   • Morbid (severe) obesity due to excess calories (CMS/[facility]) 06/12/2018   • Other problems related to lifestyle 04/24/2018   • Chronic coronary artery disease 09/19/2012   • Cardiac disease 09/19/2012   • Cyst of meniscus of knee 06/22/2012   • Resting tremor 03/05/2012   • Degenerative joint disease 02/08/2012   • Hypertension 01/18/2012   • Anxiety disorder 01/18/2012   • Obsessive-compulsive disorder 01/18/2012   • Memory loss 01/16/2012           Past Surgical History:   Procedure Laterality Date   • CARDIAC CATHETERIZATION  04/2018   • KNEE ARTHROSCOPY Right 07/09/2012     Current Outpatient Medications on File Prior to Visit   Medication Sig   • albuterol sulfate  (90 Base) MCG/ACT inhaler Inhale 2 puffs Every 4 (Four) Hours As Needed for Wheezing.   • aspirin 81 MG EC tablet Take 1 tablet by mouth Daily.   • fluticasone (Flonase) 50 MCG/ACT nasal spray 2 sprays into the nostril(s) as directed by provider Daily.   • gabapentin (NEURONTIN) 300 MG capsule 1-2 capsules 30 minutes before bedtime (Patient taking differently: 300-600 mg At Night As Needed. 1-2 capsules 30 minutes before bedtime)   • isosorbide mononitrate (IMDUR) 30 MG 24 hr tablet Take 1 tablet by mouth Every Morning.   • melatonin 5 MG tablet tablet Take 1 tablet by mouth At Night As Needed (insomnia).   • nitroglycerin (NITROSTAT) 0.4 MG SL tablet NITROSTAT 0.4 MG SUBL   • propranolol (INDERAL) 80 MG tablet Take 1 tablet by mouth 2 (Two) Times a Day.   • rosuvastatin (CRESTOR) 20 MG tablet TAKE 1 TABLET BY MOUTH EVERY DAY AT BEDTIME   • traZODone (DESYREL) 50 MG tablet 1-2 tabs qhs prn   • [DISCONTINUED]  Naproxen Sodium (ALEVE) 220 MG capsule ALEVE 220 MG CAPS     No current facility-administered medications on file prior to visit.      Allergies   Allergen Reactions   • Niacin Rash     Social History     Socioeconomic History   • Marital status: Single     Spouse name: Not on file   • Number of children: Not on file   • Years of education: Not on file   • Highest education level: Not on file   Tobacco Use   • Smoking status: Former Smoker     Packs/day: 0.25     Types: Cigarettes   • Smokeless tobacco: Never Used   Substance and Sexual Activity   • Alcohol use: No     Frequency: Never   • Drug use: No   • Sexual activity: Defer     Family History   Problem Relation Age of Onset   • Heart disease Other    • Hypertension Other    • Stroke Other    • Prostate cancer Other    • Hearing loss Mother    • Asthma Brother    • Asthma Daughter    • Arthritis Maternal Grandmother    • Arthritis Maternal Grandfather    • Diabetes Maternal Grandfather    • Stroke Maternal Grandfather      The following portions of the patient's history were reviewed and updated as appropriate: allergies, current medications, past family history, past medical history, past social history, past surgical history and problem list.    Review of Systems   Constitutional: Negative for chills and fever.   Respiratory: Negative for cough, chest tightness and shortness of breath.    Cardiovascular: Negative for chest pain and leg swelling.   Genitourinary: Negative for urinary incontinence.   Neurological: Negative for headache.       Objective   There were no vitals taken for this visit.  Physical Exam  Unable to do physical examination due to the telephonic nature of the visit.    Assessment/Plan   Diagnoses and all orders for this visit:    1. Chronic midline low back pain with bilateral sciatica (Primary)  -     MRI Lumbar Spine Without Contrast; Future  -     predniSONE (DELTASONE) 20 MG tablet; Take 2 tablets by mouth Daily. x5 days  Dispense: 10  tablet; Refill: 0  -     HYDROcodone-acetaminophen (Norco) 7.5-325 MG per tablet; Take 1 tablet by mouth Every 6 (Six) Hours As Needed for Moderate Pain .  Dispense: 42 tablet; Refill: 0    2. Lumbar degenerative disc disease    3. Polyarthropathy or polyarthritis of multiple sites  -     Rheumatoid Factor, Quant; Future  -     C-reactive protein; Future  -     Sedimentation rate, automated; Future    At this point, I think we should get an MRI of his lumbar spine.  He has prior radiographic evidence of degenerative disc disease at L5-S1 as well as multilocation facet arthritis.  He has a general complaint of multilocation joint pains.  At 49 years old it is possible that he could be experiencing some type of inflammatory arthritis.  We will go ahead and get a sed rate, CRP, rheumatoid factor as a screen for inflammatory arthritis.    I recommended he use a lot of heat to his back, begin doing gentle stretching and will do a prednisone burst pack and as needed hydrocodone.  We will follow-up with him when the results of the tests are available.         Return if symptoms worsen or fail to improve, for after test results available.    Call with any problems or concerns before next visit

## 2020-05-04 ENCOUNTER — RESULTS ENCOUNTER (OUTPATIENT)
Dept: FAMILY MEDICINE CLINIC | Facility: CLINIC | Age: 50
End: 2020-05-04

## 2020-05-04 DIAGNOSIS — M13.0 POLYARTHROPATHY OR POLYARTHRITIS OF MULTIPLE SITES: ICD-10-CM

## 2020-05-06 ENCOUNTER — TELEPHONE (OUTPATIENT)
Dept: FAMILY MEDICINE CLINIC | Facility: CLINIC | Age: 50
End: 2020-05-06

## 2020-05-06 DIAGNOSIS — M51.36 LUMBAR DEGENERATIVE DISC DISEASE: Primary | ICD-10-CM

## 2020-05-06 DIAGNOSIS — M54.41 CHRONIC MIDLINE LOW BACK PAIN WITH BILATERAL SCIATICA: ICD-10-CM

## 2020-05-06 DIAGNOSIS — M54.42 CHRONIC MIDLINE LOW BACK PAIN WITH BILATERAL SCIATICA: ICD-10-CM

## 2020-05-06 DIAGNOSIS — G89.29 CHRONIC MIDLINE LOW BACK PAIN WITH BILATERAL SCIATICA: ICD-10-CM

## 2020-05-06 NOTE — TELEPHONE ENCOUNTER
Spoke with patient, he states that he will try physical therapy if needing to.  Patient states the insurance company said that the MRI was denied because of the way it was submitted. Ill see if tom can redo the precert and see if we can get approved

## 2020-05-06 NOTE — TELEPHONE ENCOUNTER
Will you, or someone, contact his insurance about the MRI denial?  There should be some justification for it.  We may need to repeat x-rays or get him in a course of physical therapy before they will cover an MRI.  Please let Bill know this is what we are going to try to find out.  In my experience, a formal course of physical therapy would be helpful.  If he is agreeable to do this, let me know and I will get the order entered.  Thanks

## 2020-05-06 NOTE — TELEPHONE ENCOUNTER
Pt calling stating insurance denied his mri. Insurance recommended stretches. Pt states he has not worked for 2 weeks and wants to know what route he should go now since the mri got denied. Please advise.

## 2020-05-06 NOTE — TELEPHONE ENCOUNTER
"Yes, we need to get some information directly from the insurance company.  \"How it was submitted\" is a typically generic response but actually tells us nothing about why they denied it.  Thanks.    Also, please go ahead and put in a referral to physical therapy at [place] for a diagnosis of lumbar degenerative disc disease with low back pain and bilateral sciatica.  Thanks again.  "

## 2020-05-12 ENCOUNTER — TELEPHONE (OUTPATIENT)
Dept: FAMILY MEDICINE CLINIC | Facility: CLINIC | Age: 50
End: 2020-05-12

## 2020-05-12 DIAGNOSIS — M54.41 CHRONIC MIDLINE LOW BACK PAIN WITH BILATERAL SCIATICA: ICD-10-CM

## 2020-05-12 DIAGNOSIS — G89.29 CHRONIC MIDLINE LOW BACK PAIN WITH BILATERAL SCIATICA: ICD-10-CM

## 2020-05-12 DIAGNOSIS — M54.42 CHRONIC MIDLINE LOW BACK PAIN WITH BILATERAL SCIATICA: ICD-10-CM

## 2020-05-12 DIAGNOSIS — M51.36 LUMBAR DEGENERATIVE DISC DISEASE: Primary | ICD-10-CM

## 2020-05-12 RX ORDER — HYDROCODONE BITARTRATE AND ACETAMINOPHEN 7.5; 325 MG/1; MG/1
1 TABLET ORAL EVERY 6 HOURS PRN
Qty: 42 TABLET | Refills: 0 | Status: SHIPPED | OUTPATIENT
Start: 2020-05-12 | End: 2020-05-27 | Stop reason: SDUPTHER

## 2020-05-12 NOTE — TELEPHONE ENCOUNTER
Patient is not happy with the insurance - everything was faxed to them - he does not feel like he is safe to do therapy - wanting to know if he can just got to a Back Specialist - PT also stated he is needing refill on his pain med until we can get something done for him -  [facility]

## 2020-05-12 NOTE — PATIENT INSTRUCTIONS
Sciatica Rehab  Ask your health care provider which exercises are safe for you. Do exercises exactly as told by your health care provider and adjust them as directed. It is normal to feel mild stretching, pulling, tightness, or discomfort as you do these exercises, but you should stop right away if you feel sudden pain or your pain gets worse. Do not begin these exercises until told by your health care provider.  Stretching and range of motion exercises  These exercises warm up your muscles and joints and improve the movement and flexibility of your hips and your back. These exercises also help to relieve pain, numbness, and tingling.  Exercise A: Sciatic nerve glide  1. Sit in a chair with your head facing down toward your chest. Place your hands behind your back. Let your shoulders slump forward.  2. Slowly straighten one of your knees while you tilt your head back as if you are looking toward the ceiling. Only straighten your leg as far as you can without making your symptoms worse.  3. Hold for __________ seconds.  4. Slowly return to the starting position.  5. Repeat with your other leg.  Repeat __________ times. Complete this exercise __________ times a day.  Exercise B: Knee to chest with hip adduction and internal rotation    1. Lie on your back on a firm surface with both legs straight.  2. Bend one of your knees and move it up toward your chest until you feel a gentle stretch in your lower back and buttock. Then, move your knee toward the shoulder that is on the opposite side from your leg.  ? Hold your leg in this position by holding onto the front of your knee.  3. Hold for __________ seconds.  4. Slowly return to the starting position.  5. Repeat with your other leg.  Repeat __________ times. Complete this exercise __________ times a day.  Exercise C: Prone extension on elbows    1. Lie on your abdomen on a firm surface. A bed may be too soft for this exercise.  2. Prop yourself up on your  elbows.  3. Use your arms to help lift your chest up until you feel a gentle stretch in your abdomen and your lower back.  ? This will place some of your body weight on your elbows. If this is uncomfortable, try stacking pillows under your chest.  ? Your hips should stay down, against the surface that you are lying on. Keep your hip and back muscles relaxed.  4. Hold for __________ seconds.  5. Slowly relax your upper body and return to the starting position.  Repeat __________ times. Complete this exercise __________ times a day.  Strengthening exercises  These exercises build strength and endurance in your back. Endurance is the ability to use your muscles for a long time, even after they get tired.  Exercise D: Pelvic tilt  1. Lie on your back on a firm surface. Bend your knees and keep your feet flat.  2. Tense your abdominal muscles. Tip your pelvis up toward the ceiling and flatten your lower back into the floor.  ? To help with this exercise, you may place a small towel under your lower back and try to push your back into the towel.  3. Hold for __________ seconds.  4. Let your muscles relax completely before you repeat this exercise.  Repeat __________ times. Complete this exercise __________ times a day.  Exercise E: Alternating arm and leg raises    1. Get on your hands and knees on a firm surface. If you are on a hard floor, you may want to use padding to cushion your knees, such as an exercise mat.  2. Line up your arms and legs. Your hands should be below your shoulders, and your knees should be below your hips.  3. Lift your left leg behind you. At the same time, raise your right arm and straighten it in front of you.  ? Do not lift your leg higher than your hip.  ? Do not lift your arm higher than your shoulder.  ? Keep your abdominal and back muscles tight.  ? Keep your hips facing the ground.  ? Do not arch your back.  ? Keep your balance carefully, and do not hold your breath.  4. Hold for  __________ seconds.  5. Slowly return to the starting position and repeat with your right leg and your left arm.  Repeat __________ times. Complete this exercise __________ times a day.  Posture and body mechanics    Body mechanics refers to the movements and positions of your body while you do your daily activities. Posture is part of body mechanics. Good posture and healthy body mechanics can help to relieve stress in your body's tissues and joints. Good posture means that your spine is in its natural S-curve position (your spine is neutral), your shoulders are pulled back slightly, and your head is not tipped forward. The following are general guidelines for applying improved posture and body mechanics to your everyday activities.  Standing    · When standing, keep your spine neutral and your feet about hip-width apart. Keep a slight bend in your knees. Your ears, shoulders, and hips should line up.  · When you do a task in which you  one place for a long time, place one foot up on a stable object that is 2-4 inches (5-10 cm) high, such as a footstool. This helps keep your spine neutral.  Sitting    · When sitting, keep your spine neutral and keep your feet flat on the floor. Use a footrest, if necessary, and keep your thighs parallel to the floor. Avoid rounding your shoulders, and avoid tilting your head forward.  · When working at a desk or a computer, keep your desk at a height where your hands are slightly lower than your elbows. Slide your chair under your desk so you are close enough to maintain good posture.  · When working at a computer, place your monitor at a height where you are looking straight ahead and you do not have to tilt your head forward or downward to look at the screen.  Resting    · When lying down and resting, avoid positions that are most painful for you.  · If you have pain with activities such as sitting, bending, stooping, or squatting (flexion-based activities), lie in a  position in which your body does not bend very much. For example, avoid curling up on your side with your arms and knees near your chest (fetal position).  · If you have pain with activities such as standing for a long time or reaching with your arms (extension-based activities), lie with your spine in a neutral position and bend your knees slightly. Try the following positions:  ? Lying on your side with a pillow between your knees.  ? Lying on your back with a pillow under your knees.  Lifting    · When lifting objects, keep your feet at least shoulder-width apart and tighten your abdominal muscles.  · Bend your knees and hips and keep your spine neutral. It is important to lift using the strength of your legs, not your back. Do not lock your knees straight out.  · Always ask for help to lift heavy or awkward objects.  This information is not intended to replace advice given to you by your health care provider. Make sure you discuss any questions you have with your health care provider.  Document Released: 12/18/2006 Document Revised: 08/24/2017 Document Reviewed: 09/02/2016  [employer] Interactive Patient Education © 2019 [employer]

## 2020-05-19 ENCOUNTER — TELEMEDICINE (OUTPATIENT)
Dept: FAMILY MEDICINE CLINIC | Facility: CLINIC | Age: 50
End: 2020-05-19

## 2020-05-19 DIAGNOSIS — M51.37 DEGENERATION OF INTERVERTEBRAL DISC AT L5-S1 LEVEL: Primary | ICD-10-CM

## 2020-05-19 DIAGNOSIS — M54.9 BACK PAIN, SUBACUTE: ICD-10-CM

## 2020-05-19 DIAGNOSIS — M51.36 LUMBAR DEGENERATIVE DISC DISEASE: ICD-10-CM

## 2020-05-19 DIAGNOSIS — Z78.9 FAILURE OF OUTPATIENT TREATMENT: ICD-10-CM

## 2020-05-19 DIAGNOSIS — M54.16 BILATERAL LUMBAR RADICULOPATHY: ICD-10-CM

## 2020-05-19 PROCEDURE — 99214 OFFICE O/P EST MOD 30 MIN: CPT | Performed by: FAMILY MEDICINE

## 2020-05-19 NOTE — PROGRESS NOTES
Subjective   Ang Yoon is a 50 y.o. male.   Chief Complaint   Patient presents with   • Back Pain       History of Present Illness     Reevaluated today to follow-up on recent onset of acute back pain in the setting of a prior injury from which she had largely recovered prior to this event.    This current episode of pain started the last week of May, not quite 1 month ago.  Labs ordered at the last visit still pending - difficult time getting out of house.  He did make it to the office for lumbar spine x-rays- this continued to show moderate degenerative changes greatest at L5-S1 with notable disc space narrowing at that level.  He also had multilevel anterior osteophyte spurring and facet arthropathy at multiple levels.  By my visual assessment, I hardly see any L5-S1 disc space.    He has completed a course of prednisone without improvement.  He is using Norco less over the past few days as he has had to go back to work.  He will typically not take anything during his workday.  He will take 1 to 2 tablets in the evening when he gets home to help him get comfortable and rest.    He has been doing physician directed exercises at home for over a week.  He still feels too unsteady on his feet to be comfortable trying to go out physical therapy 2 or 3 times per week.   Has been trying to do the home exercises.  He states once he lays on the floor to do them, it is almost impossible to get up due to a bad left knee. Patient states that depending on how he moves it hurts his back more and makes his legs and hips go numb temporarily.  He is using steam showers with little improvement.     Patient states the low back pain has now moved to the upper back. Patient states this happened a couple of weeks ago, pain has shifted between the shoulder blades. Most of pain is on base of skull on the left side.  He is having to use his upper body more to stabilize himself when he walks, especially when the sudden jolts of  pain hit his lower back.    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   • Hypogonadism 06/13/2018   • Morbid (severe) obesity due to excess calories (CMS/[facility]) 06/12/2018   • Other problems related to lifestyle 04/24/2018   • Chronic coronary artery disease 09/19/2012   • Cardiac disease 09/19/2012   • Cyst of meniscus of knee 06/22/2012   • Resting tremor 03/05/2012   • Degenerative joint disease 02/08/2012   • Hypertension 01/18/2012   • Anxiety disorder 01/18/2012   • Obsessive-compulsive disorder 01/18/2012   • Memory loss 01/16/2012           Past Surgical History:   Procedure Laterality Date   • CARDIAC CATHETERIZATION  04/2018   • KNEE ARTHROSCOPY Right 07/09/2012     Current Outpatient Medications on File Prior to Visit   Medication Sig   • albuterol sulfate  (90 Base) MCG/ACT inhaler Inhale 2 puffs Every 4 (Four) Hours As Needed for Wheezing.   • aspirin 81 MG EC tablet Take 1 tablet by mouth Daily.   • fluticasone (Flonase) 50 MCG/ACT nasal spray 2 sprays into the nostril(s) as directed by provider Daily.   • HYDROcodone-acetaminophen (Norco) 7.5-325 MG per tablet Take 1 tablet by mouth Every 6 (Six) Hours As Needed for Moderate Pain .   • isosorbide mononitrate (IMDUR) 30 MG 24 hr tablet Take 1 tablet by mouth Every Morning.   • melatonin 5 MG tablet tablet Take 1 tablet by mouth At Night As Needed (insomnia).   • nitroglycerin (NITROSTAT) 0.4 MG SL tablet NITROSTAT 0.4 MG SUBL   • propranolol (INDERAL) 80 MG tablet Take 1 tablet by mouth 2 (Two) Times a Day.   • rosuvastatin (CRESTOR) 20 MG tablet TAKE 1 TABLET BY MOUTH EVERY DAY AT BEDTIME   •  traZODone (DESYREL) 50 MG tablet 1-2 tabs qhs prn   • gabapentin (NEURONTIN) 300 MG capsule 1-2 capsules 30 minutes before bedtime (Patient taking differently: 300-600 mg At Night As Needed. 1-2 capsules 30 minutes before bedtime)   • [DISCONTINUED] predniSONE (DELTASONE) 20 MG tablet Take 2 tablets by mouth Daily. x5 days     No current facility-administered medications on file prior to visit.      Allergies   Allergen Reactions   • Niacin Rash     Social History     Socioeconomic History   • Marital status: Single     Spouse name: Not on file   • Number of children: Not on file   • Years of education: Not on file   • Highest education level: Not on file   Tobacco Use   • Smoking status: Former Smoker     Packs/day: 0.25     Types: Cigarettes   • Smokeless tobacco: Never Used   Substance and Sexual Activity   • Alcohol use: No     Frequency: Never   • Drug use: No   • Sexual activity: Defer     Family History   Problem Relation Age of Onset   • Heart disease Other    • Hypertension Other    • Stroke Other    • Prostate cancer Other    • Hearing loss Mother    • Asthma Brother    • Asthma Daughter    • Arthritis Maternal Grandmother    • Arthritis Maternal Grandfather    • Diabetes Maternal Grandfather    • Stroke Maternal Grandfather      The following portions of the patient's history were reviewed and updated as appropriate: allergies, current medications, past family history, past medical history, past social history, past surgical history and problem list.    Review of Systems   Constitutional: Negative for chills and fever.   Respiratory: Negative for cough.        Objective   There were no vitals taken for this visit.  Physical Exam   Constitutional: He is oriented to person, place, and time. He appears well-developed and well-nourished. No distress.   Sitting in his chair in his home office.  Visibly grimaces when he shifts his weight or changes position.   Pulmonary/Chest: Effort normal.   Neurological: He  is alert and oriented to person, place, and time.   Psychiatric: He has a normal mood and affect. His speech is normal. Judgment normal. Cognition and memory are normal.         Assessment/Plan   Diagnoses and all orders for this visit:    1. Degeneration of intervertebral disc at L5-S1 level (Primary)  -     MRI Lumbar Spine Without Contrast; Future    2. Failure of outpatient treatment    3. Bilateral lumbar radiculopathy  -     MRI Lumbar Spine Without Contrast; Future    4. Lumbar degenerative disc disease    5. Back pain, subacute    Despite course of prednisone,  as needed opioids, 1 week attempt at physician directed therapy and exercises, heat therapy, body positioning aids including a new mattress-he continues to have mobility limiting low back pain radiating into both legs.  He now has progressive upper back pain which is likely due to having to use upper body strength to assist with positioning.  We are not able to get him evaluated by a spine specialist due to reduced availability due to COVID-19 pandemic.  He has x-ray proven of significant loss of disc height at L5-S1.  I am concerned about a herniated or possibly extruded disc.  I am going to reorder an MRI of his lumbar spine and specifically request that this documentation in its entirety accompanied the request to his insurance company.  He will continue to use the multiple modalities above to attempt to manage symptoms while work-up is underway.  Again, we discussed warning signs such as loss of bowel or bladder control, loss of sensation in extremity, loss of function in an extremity.  If any of those symptoms are noted he was instructed to call 911 immediately.  We will follow-up with him again in a week.    Overall, 30 minutes were spent today, greater than 50% was counseling and coordination.         Return in about 1 week (around 5/26/2020).    Call with any problems or concerns before next visit

## 2020-05-27 DIAGNOSIS — G89.29 CHRONIC MIDLINE LOW BACK PAIN WITH BILATERAL SCIATICA: ICD-10-CM

## 2020-05-27 DIAGNOSIS — M54.42 CHRONIC MIDLINE LOW BACK PAIN WITH BILATERAL SCIATICA: ICD-10-CM

## 2020-05-27 DIAGNOSIS — M54.41 CHRONIC MIDLINE LOW BACK PAIN WITH BILATERAL SCIATICA: ICD-10-CM

## 2020-05-27 RX ORDER — HYDROCODONE BITARTRATE AND ACETAMINOPHEN 7.5; 325 MG/1; MG/1
1 TABLET ORAL EVERY 6 HOURS PRN
Qty: 42 TABLET | Refills: 0 | Status: SHIPPED | OUTPATIENT
Start: 2020-05-27 | End: 2020-06-10 | Stop reason: DRUGHIGH

## 2020-05-29 DIAGNOSIS — M54.16 BILATERAL LUMBAR RADICULOPATHY: ICD-10-CM

## 2020-05-29 DIAGNOSIS — M51.37 DEGENERATION OF INTERVERTEBRAL DISC AT L5-S1 LEVEL: ICD-10-CM

## 2020-06-03 ENCOUNTER — TELEPHONE (OUTPATIENT)
Dept: FAMILY MEDICINE CLINIC | Facility: CLINIC | Age: 50
End: 2020-06-03

## 2020-06-03 NOTE — TELEPHONE ENCOUNTER
He can increase to every 4 hours until our follow-up next week.  Let me know when he is close to running out and I can refill it.  Thanks

## 2020-06-03 NOTE — TELEPHONE ENCOUNTER
Pt called and said hes taking his pain medication as prescribed but not helping, wants to know if he can take it more often or what he should do. Please advise.

## 2020-06-10 ENCOUNTER — TELEMEDICINE (OUTPATIENT)
Dept: FAMILY MEDICINE CLINIC | Facility: CLINIC | Age: 50
End: 2020-06-10

## 2020-06-10 DIAGNOSIS — M43.10 RETROLISTHESIS OF VERTEBRAE: ICD-10-CM

## 2020-06-10 DIAGNOSIS — M51.36 LUMBAR DEGENERATIVE DISC DISEASE: Primary | ICD-10-CM

## 2020-06-10 PROCEDURE — 99213 OFFICE O/P EST LOW 20 MIN: CPT | Performed by: FAMILY MEDICINE

## 2020-06-10 RX ORDER — HYDROCODONE BITARTRATE AND ACETAMINOPHEN 10; 325 MG/1; MG/1
1 TABLET ORAL EVERY 6 HOURS PRN
Qty: 42 TABLET | Refills: 0 | Status: SHIPPED | OUTPATIENT
Start: 2020-06-10 | End: 2020-06-25 | Stop reason: SDUPTHER

## 2020-06-10 RX ORDER — GABAPENTIN 300 MG/1
CAPSULE ORAL
Qty: 90 CAPSULE | Refills: 2 | Status: SHIPPED | OUTPATIENT
Start: 2020-06-10 | End: 2020-09-08 | Stop reason: SDUPTHER

## 2020-06-10 NOTE — PROGRESS NOTES
"Subjective   Ang Yoon is a 50 y.o. male.   Chief Complaint   Patient presents with   • Back Pain   • Discuss MRI       History of Present Illness   Patient wants to discuss his MRI so he can better understand the results. Patient states he wants to discuss a neurosurgeon. He cancelled his appointment with . Patient states he hasn't heard good reviews.     Johnnie is reevaluated today via telemedicine due to the COVID 19 pandemic regarding his back pain.  We are now 1 month into his back problems.  We have an MRI.  I reviewed the findings with him again today.  He has  multiple levels of retrolisthesis.  I explained that I am concerned about the stability of his spine.  Johnnie acknowledges that he is gained about 100 pounds in the past few years and he recognizes this creates an issue with his back.  He states he has not been able to exercise because of some knee problems and now because of his back problems.  He had one episode at work where he was not able to stand momentarily.  He states that his legs felt real weak and \"sleepy\" for about 20 seconds.  He has some new pains in his left upper back and neck.  He was unable to take Norco every 4 hours because it made him very sleepy.  He typically will take the Norco in the evenings so that he is able to work some.  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   • Hypogonadism 06/13/2018   • Morbid (severe) obesity due to excess calories (CMS/[facility]) 06/12/2018   • Other problems related to lifestyle 04/24/2018   • Chronic coronary artery " disease 09/19/2012   • Cardiac disease 09/19/2012   • Cyst of meniscus of knee 06/22/2012   • Resting tremor 03/05/2012   • Degenerative joint disease 02/08/2012   • Hypertension 01/18/2012   • Anxiety disorder 01/18/2012   • Obsessive-compulsive disorder 01/18/2012   • Memory loss 01/16/2012           Past Surgical History:   Procedure Laterality Date   • CARDIAC CATHETERIZATION  04/2018   • KNEE ARTHROSCOPY Right 07/09/2012     Current Outpatient Medications on File Prior to Visit   Medication Sig   • albuterol sulfate  (90 Base) MCG/ACT inhaler Inhale 2 puffs Every 4 (Four) Hours As Needed for Wheezing.   • aspirin 81 MG EC tablet Take 1 tablet by mouth Daily.   • fluticasone (Flonase) 50 MCG/ACT nasal spray 2 sprays into the nostril(s) as directed by provider Daily.   • isosorbide mononitrate (IMDUR) 30 MG 24 hr tablet Take 1 tablet by mouth Every Morning.   • melatonin 5 MG tablet tablet Take 1 tablet by mouth At Night As Needed (insomnia).   • nitroglycerin (NITROSTAT) 0.4 MG SL tablet NITROSTAT 0.4 MG SUBL   • propranolol (INDERAL) 80 MG tablet Take 1 tablet by mouth 2 (Two) Times a Day.   • rosuvastatin (CRESTOR) 20 MG tablet TAKE 1 TABLET BY MOUTH EVERY DAY AT BEDTIME   • traZODone (DESYREL) 50 MG tablet 1-2 tabs qhs prn   • [DISCONTINUED] HYDROcodone-acetaminophen (Norco) 7.5-325 MG per tablet Take 1 tablet by mouth Every 6 (Six) Hours As Needed for Moderate Pain .   • [DISCONTINUED] gabapentin (NEURONTIN) 300 MG capsule 1-2 capsules 30 minutes before bedtime (Patient taking differently: 300-600 mg At Night As Needed. 1-2 capsules 30 minutes before bedtime)     No current facility-administered medications on file prior to visit.      Allergies   Allergen Reactions   • Niacin Rash     Social History     Socioeconomic History   • Marital status: Single     Spouse name: Not on file   • Number of children: Not on file   • Years of education: Not on file   • Highest education level: Not on file   Tobacco  Use   • Smoking status: Former Smoker     Packs/day: 0.25     Types: Cigarettes   • Smokeless tobacco: Never Used   Substance and Sexual Activity   • Alcohol use: No     Frequency: Never   • Drug use: No   • Sexual activity: Defer     Family History   Problem Relation Age of Onset   • Heart disease Other    • Hypertension Other    • Stroke Other    • Prostate cancer Other    • Hearing loss Mother    • Asthma Brother    • Asthma Daughter    • Arthritis Maternal Grandmother    • Arthritis Maternal Grandfather    • Diabetes Maternal Grandfather    • Stroke Maternal Grandfather      The following portions of the patient's history were reviewed and updated as appropriate: allergies, current medications, past family history, past medical history, past social history, past surgical history and problem list.    Review of Systems   Constitutional: Negative for chills and fever.   Respiratory: Negative for cough and shortness of breath.        Objective   There were no vitals taken for this visit.  Physical Exam   Constitutional: He is oriented to person, place, and time. He appears well-developed and well-nourished. No distress.   Pulmonary/Chest: Effort normal.   Neurological: He is alert and oriented to person, place, and time.   Psychiatric: He has a normal mood and affect. His speech is normal. Judgment normal. Cognition and memory are normal.         No visits with results within 4 Month(s) from this visit.   Latest known visit with results is:   Office Visit on 11/01/2019   Component Date Value Ref Range Status   • Rapid Influenza A Ag 11/01/2019 Negative  Negative Final   • Rapid Influenza B Ag 11/01/2019 Negative  Negative Final   • Internal Control 11/01/2019 Passed  Passed Final   • Lot Number 11/01/2019 [id number]   Final   • Expiration Date 11/01/2019 [id number]   Final         Assessment/Plan   Diagnoses and all orders for this visit:    1. Lumbar degenerative disc disease (Primary)  -     gabapentin (NEURONTIN) 300  MG capsule; Take 1 capsule at bedtime for 4 to 5 days, increase to 1 capsule twice daily for 4 to 5 days, then increase to 1 capsule 3 times daily.  Dispense: 90 capsule; Refill: 2  -     HYDROcodone-acetaminophen (Norco)  MG per tablet; Take 1 tablet by mouth Every 6 (Six) Hours As Needed for Moderate Pain .  Dispense: 42 tablet; Refill: 0  -     Ambulatory Referral to Neurosurgery    2. Retrolisthesis of vertebrae    Will place a new referral, per patient request, over to [facility].  We will change his hydrocodone to 10mg tablet every 6 hours as needed.  We will also start him on gabapentin 300 mg at bedtime for 4 to 5 days and increase to 2/day then finally up to 3 times daily.  I counseled him about warning signs such as complete loss of movement in his legs, inability to stand, loss of bowel or bladder control.  If any of those warning signs are noted he should call 911 and present to the nearest hospital emergency room as soon as he can.             Return if symptoms worsen or fail to improve.    Call with any problems or concerns before next visit

## 2020-06-25 DIAGNOSIS — M51.36 LUMBAR DEGENERATIVE DISC DISEASE: ICD-10-CM

## 2020-06-26 DIAGNOSIS — M51.36 LUMBAR DEGENERATIVE DISC DISEASE: ICD-10-CM

## 2020-06-26 RX ORDER — HYDROCODONE BITARTRATE AND ACETAMINOPHEN 10; 325 MG/1; MG/1
1 TABLET ORAL EVERY 6 HOURS PRN
Qty: 42 TABLET | Refills: 0 | OUTPATIENT
Start: 2020-06-26

## 2020-06-26 RX ORDER — HYDROCODONE BITARTRATE AND ACETAMINOPHEN 10; 325 MG/1; MG/1
1 TABLET ORAL EVERY 6 HOURS PRN
Qty: 42 TABLET | Refills: 0 | Status: SHIPPED | OUTPATIENT
Start: 2020-06-26 | End: 2020-07-08 | Stop reason: SDUPTHER

## 2020-06-26 RX ORDER — HYDROCODONE BITARTRATE AND ACETAMINOPHEN 10; 325 MG/1; MG/1
1 TABLET ORAL EVERY 6 HOURS PRN
Qty: 42 TABLET | Refills: 0 | Status: SHIPPED | OUTPATIENT
Start: 2020-06-26 | End: 2020-06-26 | Stop reason: SDUPTHER

## 2020-06-26 NOTE — TELEPHONE ENCOUNTER
[facility] IS OUT OF HYDROCODONE- PT WANTING TO KNOW IF THIS COULD BE SENT TO [facility] IN [place].

## 2020-06-26 NOTE — TELEPHONE ENCOUNTER
Called and spoke with [facility] and he confirmed they are out.  Told him to cancel and we would send it elsewhere.  SG

## 2020-07-06 RX ORDER — ISOSORBIDE MONONITRATE 30 MG/1
TABLET, EXTENDED RELEASE ORAL
Qty: 90 TABLET | Refills: 3 | Status: SHIPPED | OUTPATIENT
Start: 2020-07-06 | End: 2021-07-13

## 2020-07-08 DIAGNOSIS — M51.36 LUMBAR DEGENERATIVE DISC DISEASE: ICD-10-CM

## 2020-07-08 RX ORDER — HYDROCODONE BITARTRATE AND ACETAMINOPHEN 10; 325 MG/1; MG/1
1 TABLET ORAL EVERY 6 HOURS PRN
Qty: 42 TABLET | Refills: 0 | Status: SHIPPED | OUTPATIENT
Start: 2020-07-08 | End: 2020-07-21 | Stop reason: SDUPTHER

## 2020-07-08 NOTE — TELEPHONE ENCOUNTER
Patient states [facility] is ordering an mri of neck and upper back.Saw autofusion of his verterbrae of his neck, saw that off plain xrays. Patient states he has seen ava last week. Patient states he doesn't know if [facility] is going to order more norco for him or not because he cant get in touch with the office. He is waiting for approval from his insurance for his mris.

## 2020-07-08 NOTE — TELEPHONE ENCOUNTER
Please tell Bill that I have sent in a refill for the Norco.  Also inquire as to what additional imaging they are requesting?  We do not have any additional imaging.  It is a little out of character for them to have ordered additional testing before they saw him, so is this a roundabout way of saying they want us to order some kind of additional imaging?  I do not want anything to be missed in the translation of a message.  Thanks

## 2020-07-08 NOTE — TELEPHONE ENCOUNTER
PATIENT IS REQUESTING A REFILL ON HIS PAIN MEDICATION.  HE IS WAITNING ON AN  APPOINTMENT WITH [facility].  IT IS DELAYED AS THEY ARE REQUESTING ADDITIONAL IMAGING.

## 2020-07-20 DIAGNOSIS — E78.2 MIXED HYPERLIPIDEMIA: ICD-10-CM

## 2020-07-20 RX ORDER — ROSUVASTATIN CALCIUM 20 MG/1
TABLET, COATED ORAL
Qty: 90 TABLET | Refills: 3 | Status: SHIPPED | OUTPATIENT
Start: 2020-07-20 | End: 2021-08-11

## 2020-07-21 DIAGNOSIS — M51.36 LUMBAR DEGENERATIVE DISC DISEASE: ICD-10-CM

## 2020-07-21 RX ORDER — HYDROCODONE BITARTRATE AND ACETAMINOPHEN 10; 325 MG/1; MG/1
1 TABLET ORAL EVERY 6 HOURS PRN
Qty: 42 TABLET | Refills: 0 | Status: SHIPPED | OUTPATIENT
Start: 2020-07-21 | End: 2020-08-03 | Stop reason: SDUPTHER

## 2020-07-21 NOTE — TELEPHONE ENCOUNTER
Patient needing refill of norco. Patient states ava not going to take over patient until his therapy completed.

## 2020-08-03 DIAGNOSIS — M51.36 LUMBAR DEGENERATIVE DISC DISEASE: ICD-10-CM

## 2020-08-03 RX ORDER — HYDROCODONE BITARTRATE AND ACETAMINOPHEN 10; 325 MG/1; MG/1
1 TABLET ORAL EVERY 6 HOURS PRN
Qty: 42 TABLET | Refills: 0 | Status: SHIPPED | OUTPATIENT
Start: 2020-08-03 | End: 2020-08-17 | Stop reason: SDUPTHER

## 2020-08-03 NOTE — TELEPHONE ENCOUNTER
I know we are working through arrangements with the [facility] to get his back treated, and I am happy to continue managing the pain medication.  I will need to see him before the next refill.  [place] law specifies if opioid pain medications are prescribed for 3 months in a row, we must establish a formal method of reevaluating the effectiveness and safety of the pain treatment.  If he would like, we could go ahead and schedule something in 3 or 4 weeks specifically to reevaluate the status of his pain control during his ongoing work-up.  Thanks.

## 2020-08-17 ENCOUNTER — OFFICE VISIT (OUTPATIENT)
Dept: FAMILY MEDICINE CLINIC | Facility: CLINIC | Age: 50
End: 2020-08-17

## 2020-08-17 VITALS
OXYGEN SATURATION: 95 % | SYSTOLIC BLOOD PRESSURE: 130 MMHG | DIASTOLIC BLOOD PRESSURE: 75 MMHG | HEIGHT: 72 IN | WEIGHT: 315 LBS | TEMPERATURE: 96.6 F | BODY MASS INDEX: 42.66 KG/M2 | HEART RATE: 65 BPM

## 2020-08-17 DIAGNOSIS — M51.36 LUMBAR DEGENERATIVE DISC DISEASE: ICD-10-CM

## 2020-08-17 PROBLEM — M54.12 CERVICAL RADICULOPATHY: Status: ACTIVE | Noted: 2020-08-17

## 2020-08-17 PROCEDURE — 99213 OFFICE O/P EST LOW 20 MIN: CPT | Performed by: FAMILY MEDICINE

## 2020-08-17 RX ORDER — HYDROCODONE BITARTRATE AND ACETAMINOPHEN 10; 325 MG/1; MG/1
1 TABLET ORAL 3 TIMES DAILY
Qty: 90 TABLET | Refills: 0 | Status: SHIPPED | OUTPATIENT
Start: 2020-08-17 | End: 2020-09-09 | Stop reason: SDUPTHER

## 2020-08-17 NOTE — PROGRESS NOTES
"Subjective   Ang Yoon is a 50 y.o. male.   Chief Complaint   Patient presents with   • Back Pain       History of Present Illness   Patient is here for a refill on [place].  Patient states he had an appt with [facility] last Tuesday, saw NP.  Patient states they reviewed the Cervical MRI with him and now have ordered a T-spine MRI.  Patient states that he is not doing PT at the moment because an area of concern is now in the T-spine.  Patient states the MRI t-sp is not scheduled yet.    Above reviewed and verified.      Notably, I referred him to spine surgery because of significant abnormalities on his lumbar MRI which was done on May 19.  He has not had any surgery yet.  We are essentially in the realm of working up orthopedic spine problems and I am managing his pain medication.  There is a note through Care Everywhere from August 13.  It looks like the plan was to send him to pain management for cervical epidural steroid injections at C2-3 and C4-5 and they have ordered an MRI of his thoracic spine.  He has been seeing the NP.  He still has not seen the spine surgeon.    Clinically, still not working.  Left neck pain gradually worsening.  X-ray showed auto-fusion of neck.  Sent to PT for neck before neck MRI would be approved.  During PT - developed lightning pain in mid-thoracic region to percussion.  This was reported to the [facility] now they are awaiting approval of a thoracic spine MRI.  It does not sound like much therapy will continue with his neck.  He says he has follow-up with him again soon.    He is tolerating the Norco without problems, now.  He takes typically one half in the morning, one half at noon, a whole pill between 2 and 3 in the afternoon and a whole pill between 8 to 9:00 at night.  He states that keeps his pain fairly under control and does not cause him to have any delirium or confusion.  If he takes 4 or more day he is groggy and slightly \"foggy headed\".  " He is still having good bowel movements.  No other obvious side effects of the Norco.      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   • Hypogonadism 06/13/2018   • Morbid (severe) obesity due to excess calories (CMS/[facility]) 06/12/2018   • Other problems related to lifestyle 04/24/2018   • Chronic coronary artery disease 09/19/2012   • Cardiac disease 09/19/2012   • Cyst of meniscus of knee 06/22/2012   • Resting tremor 03/05/2012   • Degenerative joint disease 02/08/2012   • Hypertension 01/18/2012   • Anxiety disorder 01/18/2012   • Obsessive-compulsive disorder 01/18/2012   • Memory loss 01/16/2012           Past Surgical History:   Procedure Laterality Date   • CARDIAC CATHETERIZATION  04/2018   • KNEE ARTHROSCOPY Right 07/09/2012     Current Outpatient Medications on File Prior to Visit   Medication Sig   • albuterol sulfate  (90 Base) MCG/ACT inhaler Inhale 2 puffs Every 4 (Four) Hours As Needed for Wheezing.   • aspirin 81 MG EC tablet Take 1 tablet by mouth Daily.   • fluticasone (Flonase) 50 MCG/ACT nasal spray 2 sprays into the nostril(s) as directed by provider Daily.   • gabapentin (NEURONTIN) 300 MG capsule Take 1 capsule at bedtime for 4 to 5 days, increase to 1 capsule twice daily for 4 to 5 days, then increase to 1 capsule 3 times daily.   • isosorbide mononitrate (IMDUR) 30 MG 24 hr tablet TAKE 1 TABLET BY MOUTH ONCE DAILY IN THE MORNING   • melatonin 5 MG tablet tablet Take 1 tablet by mouth At Night As Needed (insomnia).   • nitroglycerin (NITROSTAT) 0.4 MG SL tablet NITROSTAT 0.4 MG SUBL   • propranolol  "(INDERAL) 80 MG tablet Take 1 tablet by mouth 2 (Two) Times a Day.   • rosuvastatin (CRESTOR) 20 MG tablet TAKE 1 TABLET BY MOUTH ONCE DAILY AT BEDTIME   • traZODone (DESYREL) 50 MG tablet 1-2 tabs qhs prn   • [DISCONTINUED] HYDROcodone-acetaminophen (Norco)  MG per tablet Take 1 tablet by mouth Every 6 (Six) Hours As Needed for Moderate Pain .     No current facility-administered medications on file prior to visit.      Allergies   Allergen Reactions   • Niacin Rash     Social History     Socioeconomic History   • Marital status: Single     Spouse name: Not on file   • Number of children: Not on file   • Years of education: Not on file   • Highest education level: Not on file   Tobacco Use   • Smoking status: Former Smoker     Packs/day: 0.25     Types: Cigarettes   • Smokeless tobacco: Never Used   Substance and Sexual Activity   • Alcohol use: No     Frequency: Never   • Drug use: No   • Sexual activity: Defer     Family History   Problem Relation Age of Onset   • Heart disease Other    • Hypertension Other    • Stroke Other    • Prostate cancer Other    • Hearing loss Mother    • Asthma Brother    • Asthma Daughter    • Arthritis Maternal Grandmother    • Arthritis Maternal Grandfather    • Diabetes Maternal Grandfather    • Stroke Maternal Grandfather      The following portions of the patient's history were reviewed and updated as appropriate: allergies, current medications, past family history, past medical history, past social history, past surgical history and problem list.    Review of Systems   Constitutional: Positive for fatigue. Negative for chills.   Respiratory: Negative for cough.    Gastrointestinal: Negative for abdominal pain.       Objective   /75 (BP Location: Right arm, Patient Position: Sitting, Cuff Size: Adult)   Pulse 65   Temp 96.6 °F (35.9 °C) (Infrared)   Ht 182.9 cm (72.01\")   Wt (!) 168 kg (370 lb 3.2 oz)   SpO2 95%   BMI 50.20 kg/m²   Physical Exam   Constitutional: " He is oriented to person, place, and time. He appears well-developed and well-nourished.   Wearing a face mask    Frequent position changes from standing to sitting throughout the visit.     HENT:   Head: Normocephalic and atraumatic.   Eyes: Conjunctivae and EOM are normal.   Neck: Normal range of motion.   Cardiovascular: Normal rate.   Pulmonary/Chest: Effort normal.   Musculoskeletal: Normal range of motion.   Neurological: He is alert and oriented to person, place, and time.   Skin: Skin is warm and dry. No rash noted.   Psychiatric: He has a normal mood and affect. His behavior is normal.           Assessment/Plan   Diagnoses and all orders for this visit:    1. Lumbar degenerative disc disease  -     HYDROcodone-acetaminophen (Norco)  MG per tablet; Take 1 tablet by mouth 3 (Three) Times a Day.  Dispense: 90 tablet; Refill: 0        At this point, I suggest he follow-up with [facility] to see what their ultimate recommendations are regarding his back.  He does have epidural injections planned for his neck, and I encouraged him to continue to follow through with that.  We were able to access some care everywhere records, and I think based on the MRI findings described, there is a good chance injections could help that.  We will continue his pain medication at up to 3 a day for now.  If some decision has not been made about some intervention within a month, let me know and I will continue to refill his hydrocodone a bit longer.  I am still not quite sure that he will not need some type of intervention for his back.         Return if symptoms worsen or fail to improve.    Call with any problems or concerns before next visit  Answers for HPI/ROS submitted by the patient on 8/10/2020   Back pain  What is the primary reason for your visit?: Back Pain

## 2020-09-08 DIAGNOSIS — M51.36 LUMBAR DEGENERATIVE DISC DISEASE: ICD-10-CM

## 2020-09-08 RX ORDER — GABAPENTIN 300 MG/1
300 CAPSULE ORAL 3 TIMES DAILY
Qty: 90 CAPSULE | Refills: 2 | Status: SHIPPED | OUTPATIENT
Start: 2020-09-08 | End: 2021-05-21

## 2020-09-09 DIAGNOSIS — M51.36 LUMBAR DEGENERATIVE DISC DISEASE: ICD-10-CM

## 2020-09-09 RX ORDER — HYDROCODONE BITARTRATE AND ACETAMINOPHEN 10; 325 MG/1; MG/1
1 TABLET ORAL 3 TIMES DAILY
Qty: 90 TABLET | Refills: 0 | Status: SHIPPED | OUTPATIENT
Start: 2020-09-09 | End: 2020-10-14 | Stop reason: SDUPTHER

## 2020-09-22 ENCOUNTER — OFFICE VISIT (OUTPATIENT)
Dept: FAMILY MEDICINE CLINIC | Facility: CLINIC | Age: 50
End: 2020-09-22

## 2020-09-22 DIAGNOSIS — M54.12 CERVICAL RADICULOPATHY: ICD-10-CM

## 2020-09-22 DIAGNOSIS — M54.41 CHRONIC MIDLINE LOW BACK PAIN WITH BILATERAL SCIATICA: ICD-10-CM

## 2020-09-22 DIAGNOSIS — M54.42 CHRONIC MIDLINE LOW BACK PAIN WITH BILATERAL SCIATICA: ICD-10-CM

## 2020-09-22 DIAGNOSIS — G89.29 CHRONIC MIDLINE LOW BACK PAIN WITH BILATERAL SCIATICA: ICD-10-CM

## 2020-09-22 DIAGNOSIS — M51.36 LUMBAR DEGENERATIVE DISC DISEASE: Primary | ICD-10-CM

## 2020-09-22 PROBLEM — H52.229 REGULAR ASTIGMATISM: Status: ACTIVE | Noted: 2017-06-01

## 2020-09-22 PROBLEM — H52.4 PRESBYOPIA: Status: ACTIVE | Noted: 2017-06-01

## 2020-09-22 PROBLEM — H52.10 MYOPIA: Status: ACTIVE | Noted: 2017-06-01

## 2020-09-22 PROCEDURE — 99443 PR PHYS/QHP TELEPHONE EVALUATION 21-30 MIN: CPT | Performed by: FAMILY MEDICINE

## 2020-09-22 NOTE — PROGRESS NOTES
Subjective   Ang Yoon is a 50 y.o. male.   Chief Complaint   Patient presents with   • Back Pain       History of Present Illness   You have chosen to receive care through a telephone visit. Do you consent to use a telephone visit for your medical care today? Yes    Ang is reevaluated today via telemedicine by his choice.  He has significant problems with his lumbar spine including multiple bulging disks, spondylolisthesis, and multiple areas of neural foraminal impingement.  He was referred over to the [facility] spine Center several months ago.  He has completed physical therapy regarding his back and it did not help.  He mentioned to them he was also having neck pain.  They ordered a cervical MRI which basically showed some bulging of the disks at C2-C3 and C4-C5 with lateral recess narrowing.  There is no overt disc extrusion compression or other fracture or subluxation.  He has been referred for cervical epidural steroid injections and that is scheduled in early October.    While doing physical therapy for his low back pain, he mentioned a severe thoracic back pain that was worsened by palpation of an area in his mid thoracic spine.  Lower back therapy was halted and this was reported to the spine center and they have requested MRI of his thoracic spine.  That has not yet been approved by his insurance company.  Ang is frustrated that he has had lower back pain for months.  He has not seen a surgeon yet regarding what, if anything, can be done for his lower back.  He has now been told he has to do a 6-week course of physical therapy regarding the mid thoracic back pain before his insurance company will approve the requested MRI.  He is very frustrated by all this.  He feels like he is getting nowhere.  He tells me that the last time he tried to talk to someone at his insurance company regarding this they arranged a three-way telephone call with a chiropractor, Dr. Barbosa, who talked to see  him about setting up chiropractic treatment of these problems.  I did require experienced a situation where and insurance company directly involved a non-MD provider to advise over the telephone and even offered to arrange treatment.    Ang tells me that he is only able to tolerate his days off work a little better because he has some of the Norco to use.  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   • Hypogonadism 06/13/2018   • Morbid (severe) obesity due to excess calories (CMS/[facility]) 06/12/2018   • Other problems related to lifestyle 04/24/2018   • Regular astigmatism 06/01/2017   • Presbyopia 06/01/2017   • Myopia 06/01/2017   • Chronic coronary artery disease 09/19/2012   • Cardiac disease 09/19/2012   • Cyst of meniscus of knee 06/22/2012   • Resting tremor 03/05/2012   • Degenerative joint disease 02/08/2012   • Hypertension 01/18/2012   • Anxiety disorder 01/18/2012   • Obsessive-compulsive disorder 01/18/2012   • Memory loss 01/16/2012           Past Surgical History:   Procedure Laterality Date   • CARDIAC CATHETERIZATION  04/2018   • KNEE ARTHROSCOPY Right 07/09/2012     Current Outpatient Medications on File Prior to Visit   Medication Sig   • albuterol sulfate  (90 Base) MCG/ACT inhaler Inhale 2 puffs Every 4 (Four) Hours As Needed for Wheezing.   • aspirin 81 MG EC tablet Take 1 tablet by mouth Daily.   • fluticasone (Flonase) 50 MCG/ACT nasal spray 2 sprays into the nostril(s) as directed by provider Daily.   • gabapentin  (NEURONTIN) 300 MG capsule Take 1 capsule by mouth 3 (Three) Times a Day.   • HYDROcodone-acetaminophen (Norco)  MG per tablet Take 1 tablet by mouth 3 (Three) Times a Day.   • isosorbide mononitrate (IMDUR) 30 MG 24 hr tablet TAKE 1 TABLET BY MOUTH ONCE DAILY IN THE MORNING   • melatonin 5 MG tablet tablet Take 1 tablet by mouth At Night As Needed (insomnia).   • nitroglycerin (NITROSTAT) 0.4 MG SL tablet NITROSTAT 0.4 MG SUBL   • propranolol (INDERAL) 80 MG tablet Take 1 tablet by mouth 2 (Two) Times a Day.   • rosuvastatin (CRESTOR) 20 MG tablet TAKE 1 TABLET BY MOUTH ONCE DAILY AT BEDTIME   • traZODone (DESYREL) 50 MG tablet 1-2 tabs qhs prn     No current facility-administered medications on file prior to visit.      Allergies   Allergen Reactions   • Niacin Rash     Social History     Socioeconomic History   • Marital status: Single     Spouse name: Not on file   • Number of children: Not on file   • Years of education: Not on file   • Highest education level: Not on file   Tobacco Use   • Smoking status: Former Smoker     Packs/day: 0.25     Types: Cigarettes   • Smokeless tobacco: Never Used   Substance and Sexual Activity   • Alcohol use: No     Frequency: Never   • Drug use: No   • Sexual activity: Defer     Family History   Problem Relation Age of Onset   • Heart disease Other    • Hypertension Other    • Stroke Other    • Prostate cancer Other    • Hearing loss Mother    • Asthma Brother    • Asthma Daughter    • Arthritis Maternal Grandmother    • Arthritis Maternal Grandfather    • Diabetes Maternal Grandfather    • Stroke Maternal Grandfather      The following portions of the patient's history were reviewed and updated as appropriate: allergies, current medications, past family history, past medical history, past social history, past surgical history and problem list.    Review of Systems   Constitutional: Negative for chills and fever.   Respiratory: Negative for cough and shortness of breath.     Cardiovascular: Negative for chest pain.       Objective   There were no vitals taken for this visit.  Physical Exam  Unable to do physical examination due to the telephonic nature of the visit.            Assessment/Plan   Diagnoses and all orders for this visit:    1. Lumbar degenerative disc disease (Primary)    2. Cervical radiculopathy    3. Chronic midline low back pain with bilateral sciatica    Around 23 minutes were spent on the phone with Ang today.  Most of our discussion centered around is explaining to me where he is with current treatment for his back and neck problems.  At the end of our discussion the conclusion is to proceed with the injections proposed for his neck.  If that improves the neck pain, I think the focus will be shifted to his lower back.  I have advised him to try to do the physical therapy for his midthoracic pain.  If he has other things that occur while he is trying to do therapy, it can always be stopped and he can be referred back for reevaluation.  I recommend keeping follow-up with [facility] regarding the situation.  I will continue to follow with him regarding management of his pain.  It seems to be effective at the present time.  He has no signs or symptoms of depression, suicidal thoughts, there is no suggestion of drug diversion or drug-seeking.  I would like to try to follow-up with him again within the next month, or sooner if necessary based upon management of his pain.         No follow-ups on file.    Call with any problems or concerns before next visit

## 2020-10-14 DIAGNOSIS — M51.36 LUMBAR DEGENERATIVE DISC DISEASE: ICD-10-CM

## 2020-10-14 RX ORDER — HYDROCODONE BITARTRATE AND ACETAMINOPHEN 10; 325 MG/1; MG/1
1 TABLET ORAL 3 TIMES DAILY
Qty: 90 TABLET | Refills: 0 | Status: SHIPPED | OUTPATIENT
Start: 2020-10-14 | End: 2020-11-12 | Stop reason: SDUPTHER

## 2020-10-14 NOTE — TELEPHONE ENCOUNTER
Thank you for the update.  I certainly hope that the second procedure offers more relief than the first 1.

## 2020-10-14 NOTE — TELEPHONE ENCOUNTER
Patient wanted to let  know that 2 weeks ago they did his first neck procedure @[facility],they have 3 more scheduled after that, his next one is on November 4th they said that they will cancel the other two if the second procedure goes not produce results,the first one did not.

## 2020-10-26 RX ORDER — PROPRANOLOL HYDROCHLORIDE 80 MG/1
TABLET ORAL
Qty: 60 TABLET | Refills: 5 | Status: SHIPPED | OUTPATIENT
Start: 2020-10-26 | End: 2021-05-21

## 2020-11-12 DIAGNOSIS — M51.36 LUMBAR DEGENERATIVE DISC DISEASE: ICD-10-CM

## 2020-11-12 RX ORDER — HYDROCODONE BITARTRATE AND ACETAMINOPHEN 10; 325 MG/1; MG/1
1 TABLET ORAL 3 TIMES DAILY
Qty: 90 TABLET | Refills: 0 | Status: SHIPPED | OUTPATIENT
Start: 2020-11-12 | End: 2020-12-17 | Stop reason: SDUPTHER

## 2020-11-12 NOTE — TELEPHONE ENCOUNTER
Spoke to patient today. He is requesting another fill of his Hydrocodone to [facility] pharmacy please. He states he just had his 2nd neck pain injection at [facility] yesterday. He says he has 2 more to go, however after this second one if he does not have any relief he wanted to discuss alternative treatment options. He states he cannot tell any difference yet in the pain.

## 2020-11-12 NOTE — TELEPHONE ENCOUNTER
I believe if he does not have a good response from the injections then they plan to discuss surgery with him.  Before we talk, I recommend he verify with [facility] what their plan is if there is no improvement following completion of the series of injections.  Thanks

## 2020-12-17 DIAGNOSIS — M51.36 LUMBAR DEGENERATIVE DISC DISEASE: ICD-10-CM

## 2020-12-17 RX ORDER — HYDROCODONE BITARTRATE AND ACETAMINOPHEN 10; 325 MG/1; MG/1
1 TABLET ORAL 3 TIMES DAILY
Qty: 90 TABLET | Refills: 0 | Status: SHIPPED | OUTPATIENT
Start: 2020-12-17 | End: 2021-01-20 | Stop reason: SDUPTHER

## 2020-12-22 ENCOUNTER — TELEMEDICINE (OUTPATIENT)
Dept: FAMILY MEDICINE CLINIC | Facility: CLINIC | Age: 50
End: 2020-12-22

## 2020-12-22 DIAGNOSIS — M54.12 CERVICAL RADICULOPATHY: ICD-10-CM

## 2020-12-22 DIAGNOSIS — J45.41 MODERATE PERSISTENT REACTIVE AIRWAY DISEASE WITH ACUTE EXACERBATION: ICD-10-CM

## 2020-12-22 DIAGNOSIS — M51.37 DEGENERATION OF INTERVERTEBRAL DISC AT L5-S1 LEVEL: ICD-10-CM

## 2020-12-22 DIAGNOSIS — M51.36 LUMBAR DEGENERATIVE DISC DISEASE: Primary | ICD-10-CM

## 2020-12-22 DIAGNOSIS — R06.02 SHORTNESS OF BREATH: ICD-10-CM

## 2020-12-22 DIAGNOSIS — M43.10 RETROLISTHESIS OF VERTEBRAE: ICD-10-CM

## 2020-12-22 PROCEDURE — 99213 OFFICE O/P EST LOW 20 MIN: CPT | Performed by: FAMILY MEDICINE

## 2020-12-22 RX ORDER — ALBUTEROL SULFATE 90 UG/1
AEROSOL, METERED RESPIRATORY (INHALATION)
Qty: 9 G | Refills: 0 | Status: CANCELLED | OUTPATIENT
Start: 2020-12-22

## 2020-12-22 NOTE — PROGRESS NOTES
Subjective   Ang Yoon is a 50 y.o. male.   Chief Complaint   Patient presents with   • mid back pain   • Neck Pain       History of Present Illness   Patient would like to discuss his mid back pain and neck pain today. He has been seeing [facility] for a year or so and feels he has not make any progress with them. He would like a new referral elsewhere.   Above reviewed and verified.  Johnnie is reevaluated today by telemedicine by his choice.  He has advanced lumbar and cervical degenerative disc disease with lumbar retrolisthesis.  He has been following at the [facility] for nearly a year.  He has yet to see the physician.  He is being managed primarily by nurse practitioner.  She is referred him for cervical injections.  He has had a few of those and that does not seem to make much of it difference in his neck.  Most of the pain that he has is in his lower back.  He states that the burning in his upper neck and base of his skull is pretty much always there and has not changed based on the injections.  It is not the neck problems that require routine opioids.  It is his lower back because of such severe pain.  He will take anywhere from 2-3 Norco per day.  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  He denies any confusion or delirium or constipation or side effects from the opioids.  It has not caused him any problems with decision-making at work for example.    He is becoming discouraged that after following there for nearly a year treatments have been focused on his neck, which was not even the main focus of his original referral.  He continues to have severe pain in his back.  He acknowledges that there is a problem with his neck and he understands that when there is comorbid cervical and lumbar spine problems, cervical spine problems are addressed first.  He tells  me he is concerned because he really does not know what the long-term plan even is.  He is ready for a second opinion.        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   • Hypogonadism 06/13/2018   • Morbid (severe) obesity due to excess calories (CMS/[facility]) 06/12/2018   • Other problems related to lifestyle 04/24/2018   • Regular astigmatism 06/01/2017   • Presbyopia 06/01/2017   • Myopia 06/01/2017   • Chronic coronary artery disease 09/19/2012   • Cardiac disease 09/19/2012   • Cyst of meniscus of knee 06/22/2012   • Resting tremor 03/05/2012   • Degenerative joint disease 02/08/2012   • Hypertension 01/18/2012   • Anxiety disorder 01/18/2012   • Obsessive-compulsive disorder 01/18/2012   • Memory loss 01/16/2012           Past Surgical History:   Procedure Laterality Date   • CARDIAC CATHETERIZATION  04/2018   • KNEE ARTHROSCOPY Right 07/09/2012     Current Outpatient Medications on File Prior to Visit   Medication Sig   • fluticasone (Flonase) 50 MCG/ACT nasal spray 2 sprays into the nostril(s) as directed by provider Daily.   • gabapentin (NEURONTIN) 300 MG capsule Take 1 capsule by mouth 3 (Three) Times a Day.   • HYDROcodone-acetaminophen (Norco)  MG per tablet Take 1 tablet by mouth 3 (Three) Times a Day.   • isosorbide mononitrate (IMDUR) 30 MG 24 hr tablet TAKE 1 TABLET BY MOUTH ONCE DAILY IN THE MORNING   • melatonin 5 MG tablet tablet Take 1 tablet by mouth At Night As Needed (insomnia).   • nitroglycerin (NITROSTAT) 0.4 MG SL tablet NITROSTAT 0.4 MG SUBL   • propranolol (INDERAL) 80 MG tablet Take 1 tablet by mouth  twice daily   • rosuvastatin (CRESTOR) 20 MG tablet TAKE 1 TABLET BY MOUTH ONCE DAILY AT BEDTIME   • [DISCONTINUED] albuterol sulfate  (90 Base) MCG/ACT inhaler Inhale 2 puffs Every 4 (Four) Hours As Needed for Wheezing.   • [DISCONTINUED] aspirin 81 MG EC tablet Take 1 tablet by mouth Daily.   • [DISCONTINUED] traZODone (DESYREL) 50 MG tablet 1-2 tabs qhs prn     No current facility-administered medications on file prior to visit.      Allergies   Allergen Reactions   • Niacin Rash     Social History     Socioeconomic History   • Marital status: Single     Spouse name: Not on file   • Number of children: Not on file   • Years of education: Not on file   • Highest education level: Not on file   Tobacco Use   • Smoking status: Former Smoker     Packs/day: 0.25     Types: Cigarettes   • Smokeless tobacco: Never Used   Substance and Sexual Activity   • Alcohol use: No     Frequency: Never   • Drug use: No   • Sexual activity: Defer     Family History   Problem Relation Age of Onset   • Heart disease Other    • Hypertension Other    • Stroke Other    • Prostate cancer Other    • Hearing loss Mother    • Asthma Brother    • Asthma Daughter    • Arthritis Maternal Grandmother    • Arthritis Maternal Grandfather    • Diabetes Maternal Grandfather    • Stroke Maternal Grandfather      The following portions of the patient's history were reviewed and updated as appropriate: allergies, current medications, past family history, past medical history, past social history, past surgical history and problem list.    Review of Systems   Constitutional: Negative for chills and fever.   Respiratory: Negative for cough and shortness of breath.        Objective   There were no vitals taken for this visit.  Physical Exam  Constitutional:       General: He is not in acute distress.     Appearance: He is well-developed.   Pulmonary:      Effort: Pulmonary effort is normal.   Neurological:      Mental Status: He is alert and  oriented to person, place, and time.   Psychiatric:         Speech: Speech normal.         Judgment: Judgment normal.       Assessment/Plan   Diagnoses and all orders for this visit:    1. Lumbar degenerative disc disease (Primary)  -     Ambulatory Referral to Neurosurgery    2. Cervical radiculopathy  -     Ambulatory Referral to Neurosurgery    3. Retrolisthesis of vertebrae  -     Ambulatory Referral to Neurosurgery    4. Degeneration of intervertebral disc at L5-S1 level  -     Ambulatory Referral to Neurosurgery    At this point, we will try to facilitate a second opinion consult with Dr. Montes over in [place] through [facility].  All of his records are available within Epic.  With regard to his pain medications, the plan never was to keep him on that forever.  My expectation was that some resolution would have been arrived at regarding surgical or other interventions for his spine problems.  If the conclusion was chronic pain-he was going to be referred to pain management.  We have delayed that because there has been indeed a delay in a decision regarding whether or not something can be done to help him via interventions, be at surgery or otherwise.  We will continue judicious use of opioids, again, pending formal prognosis after he has seen a spine surgeon.  We will follow-up with him again after that consult has happened.             Return if symptoms worsen or fail to improve, for f/u after neurosurgery second opinion.    Call with any problems or concerns before next visit

## 2020-12-23 RX ORDER — TRAZODONE HYDROCHLORIDE 50 MG/1
TABLET ORAL
Qty: 60 TABLET | Refills: 3 | Status: SHIPPED | OUTPATIENT
Start: 2020-12-23 | End: 2021-03-29 | Stop reason: SDUPTHER

## 2020-12-23 RX ORDER — HYDROGEN PEROXIDE 2.65 ML/100ML
LIQUID ORAL; TOPICAL
Qty: 30 TABLET | Refills: 3 | Status: SHIPPED | OUTPATIENT
Start: 2020-12-23

## 2020-12-23 RX ORDER — ALBUTEROL SULFATE 90 UG/1
AEROSOL, METERED RESPIRATORY (INHALATION)
Qty: 18 G | Refills: 3 | Status: SHIPPED | OUTPATIENT
Start: 2020-12-23 | End: 2021-01-01

## 2020-12-24 DIAGNOSIS — R06.02 SHORTNESS OF BREATH: ICD-10-CM

## 2020-12-24 DIAGNOSIS — J45.41 MODERATE PERSISTENT REACTIVE AIRWAY DISEASE WITH ACUTE EXACERBATION: ICD-10-CM

## 2021-01-01 RX ORDER — ALBUTEROL SULFATE 90 UG/1
AEROSOL, METERED RESPIRATORY (INHALATION)
Qty: 9 G | Refills: 0 | Status: SHIPPED | OUTPATIENT
Start: 2021-01-01 | End: 2022-04-12 | Stop reason: SDUPTHER

## 2021-01-06 RX ORDER — NITROGLYCERIN 0.4 MG/1
0.4 TABLET SUBLINGUAL
Qty: 25 TABLET | Refills: 2 | Status: SHIPPED | OUTPATIENT
Start: 2021-01-06

## 2021-01-18 ENCOUNTER — OFFICE VISIT (OUTPATIENT)
Dept: FAMILY MEDICINE CLINIC | Facility: CLINIC | Age: 51
End: 2021-01-18

## 2021-01-18 VITALS
BODY MASS INDEX: 42.66 KG/M2 | WEIGHT: 315 LBS | HEART RATE: 65 BPM | OXYGEN SATURATION: 96 % | HEIGHT: 72 IN | SYSTOLIC BLOOD PRESSURE: 124 MMHG | TEMPERATURE: 98.2 F | DIASTOLIC BLOOD PRESSURE: 82 MMHG

## 2021-01-18 DIAGNOSIS — R43.0 LOSS OF PERCEPTION FOR SMELL: Primary | ICD-10-CM

## 2021-01-18 PROCEDURE — 99213 OFFICE O/P EST LOW 20 MIN: CPT | Performed by: NURSE PRACTITIONER

## 2021-01-18 NOTE — PROGRESS NOTES
"    Ang Yoon is a 50 y.o. male.     50-year-old grossly obese white male with chronic neck and back pain, coronary myocardial bridge, CAD, anxiety, obsessive-compulsive disorder who comes in today with complaints of 1 week history of nose running and congestion with no symptoms however yesterday he lost his  sense of taste and smell and is wanting to be Covid tested.  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    Vitals:    01/18/21 1147   BP: 124/82   BP Location: Left arm   Patient Position: Sitting   Cuff Size: Large Adult   Pulse: 65   Temp: 98.2 °F (36.8 °C)   TempSrc: Oral   SpO2: 96%   Weight: (!) 166 kg (367 lb)   Height: 182.9 cm (72\")     Body mass index is 49.77 kg/m².    Past Medical History:   Diagnosis Date   • Arthritis    • CAD (coronary artery disease)    • Dyspnea on exertion 07/16/2019   • Hyperlipidemia    • Hypertension    • Obesity    • Sleep apnea, obstructive      Past Surgical History:   Procedure Laterality Date   • CARDIAC CATHETERIZATION  04/2018   • KNEE ARTHROSCOPY Right 07/09/2012     Family History   Problem Relation Age of Onset   • Heart disease Other    • Hypertension Other    • Stroke Other    • Prostate cancer Other    • Hearing loss Mother    • Asthma Brother    • Asthma Daughter    • Arthritis Maternal Grandmother    • Arthritis Maternal Grandfather    • Diabetes Maternal Grandfather    • Stroke Maternal Grandfather      Immunization History   Administered Date(s) Administered   • Flu Vaccine " Intradermal Quad 18-64YR 06/07/2012   • Influenza, Unspecified 12/16/2019   • Pneumococcal Polysaccharide (PPSV23) 06/07/2012   • Tdap 01/28/2020   • flucelvax quad pfs =>4 YRS 12/16/2019       Office Visit on 11/01/2019   Component Date Value Ref Range Status   • Rapid Influenza A Ag 11/01/2019 Negative  Negative Final   • Rapid Influenza B Ag 11/01/2019 Negative  Negative Final   • Internal Control 11/01/2019 Passed  Passed Final   • Lot Number 11/01/2019 [id number]   Final   • Expiration Date 11/01/2019 [id number]   Final         Review of Systems   Constitutional: Negative.    HENT: Positive for congestion.    Respiratory: Negative.    Cardiovascular: Negative.    Gastrointestinal: Negative.    Genitourinary: Negative.    Musculoskeletal: Negative.    Skin: Negative.    Neurological: Negative.    Psychiatric/Behavioral: Negative.        Objective   Physical Exam  Constitutional:       Appearance: Normal appearance.   HENT:      Head: Normocephalic.   Neck:      Musculoskeletal: Normal range of motion.   Cardiovascular:      Rate and Rhythm: Normal rate and regular rhythm.      Pulses: Normal pulses.      Heart sounds: Normal heart sounds.   Pulmonary:      Effort: Pulmonary effort is normal.      Breath sounds: Normal breath sounds.   Musculoskeletal: Normal range of motion.   Skin:     General: Skin is warm and dry.   Neurological:      General: No focal deficit present.      Mental Status: He is alert and oriented to person, place, and time.   Psychiatric:         Mood and Affect: Mood normal.         Behavior: Behavior normal.         Procedures    Assessment/Plan   Diagnoses and all orders for this visit:    1. Loss of perception for smell (Primary)  -     COVID-19,[facility] ROUTINE, NP/OP SWAB IN TRANSPORT MEDIA OR ESWAB 72 HR TAT - Swab, Nasopharynx          Current Outpatient Medications:   •  albuterol sulfate  (90 Base) MCG/ACT inhaler, INHALE 2 PUFFS BY MOUTH EVERY 4 HOURS AS NEEDED FOR WHEEZING,  Disp: 9 g, Rfl: 0  •  EQ Aspirin Adult Low Dose 81 MG EC tablet, Take 1 tablet by mouth once daily, Disp: 30 tablet, Rfl: 3  •  fluticasone (Flonase) 50 MCG/ACT nasal spray, 2 sprays into the nostril(s) as directed by provider Daily., Disp: 1 bottle, Rfl: 2  •  gabapentin (NEURONTIN) 300 MG capsule, Take 1 capsule by mouth 3 (Three) Times a Day., Disp: 90 capsule, Rfl: 2  •  HYDROcodone-acetaminophen (Norco)  MG per tablet, Take 1 tablet by mouth 3 (Three) Times a Day., Disp: 90 tablet, Rfl: 0  •  isosorbide mononitrate (IMDUR) 30 MG 24 hr tablet, TAKE 1 TABLET BY MOUTH ONCE DAILY IN THE MORNING, Disp: 90 tablet, Rfl: 3  •  melatonin 5 MG tablet tablet, Take 1 tablet by mouth At Night As Needed (insomnia)., Disp: 90 tablet, Rfl: 2  •  nitroglycerin (Nitrostat) 0.4 MG SL tablet, Place 1 tablet under the tongue Every 5 (Five) Minutes As Needed for Chest Pain. Take no more than 3 doses in 15 minutes., Disp: 25 tablet, Rfl: 2  •  propranolol (INDERAL) 80 MG tablet, Take 1 tablet by mouth twice daily, Disp: 60 tablet, Rfl: 5  •  rosuvastatin (CRESTOR) 20 MG tablet, TAKE 1 TABLET BY MOUTH ONCE DAILY AT BEDTIME, Disp: 90 tablet, Rfl: 3  •  traZODone (DESYREL) 50 MG tablet, TAKE 1 TO 2 TABLETS BY MOUTH EVERY DAY AT BEDTIME AS NEEDED, Disp: 60 tablet, Rfl: 3

## 2021-01-19 ENCOUNTER — TELEPHONE (OUTPATIENT)
Dept: FAMILY MEDICINE CLINIC | Facility: CLINIC | Age: 51
End: 2021-01-19

## 2021-01-19 NOTE — TELEPHONE ENCOUNTER
He may use his albuterol inhaler up to 2 puffs every 4 hours if he is wheezing.  For the nasal congestion, Sudafed, nasal saline spray, and nasal steroid sprays such as Flonase or Nasacort do seem to help.  His Covid test is not back yet.  Rest for now, use the treatments above and I will let him know as soon as I hear about the Covid test.  As he was told before, if he develops any chest pain, pressure, tightness, difficulty breathing, definitely go to the emergency room.  Finally, a home pulse oximeter is actually a very good way to monitor shortness of breath to make sure it is not getting significant.  Fingertip pulse oximeter's are sold at pharmacies for around $30 if he is interested in sending someone to pick 1 up for him.  Thanks

## 2021-01-19 NOTE — TELEPHONE ENCOUNTER
Patient called office & left voice mail stating he was in for a COVID test yesterday. I called him back & spoke w/him- he states since last night he has had a little more trouble breathing when he moves around and when he lies down. I advised him any worsening shortness of breath or chest pain to go to ER to be re-evaluated. He states he has a lot of nasal congestion & feels very tired- he thinks the nasal congestion is just making it harder for him to breathe. He states he has no fever. He does not have a pulse ox at home.  He wanted to know if he should be using his inhaler more, or if you had any other recommendations for him?

## 2021-01-20 ENCOUNTER — TELEPHONE (OUTPATIENT)
Dept: FAMILY MEDICINE CLINIC | Facility: CLINIC | Age: 51
End: 2021-01-20

## 2021-01-20 DIAGNOSIS — M51.36 LUMBAR DEGENERATIVE DISC DISEASE: ICD-10-CM

## 2021-01-20 LAB — SARS-COV-2 RNA RESP QL NAA+PROBE: DETECTED

## 2021-01-20 RX ORDER — HYDROCODONE BITARTRATE AND ACETAMINOPHEN 10; 325 MG/1; MG/1
1 TABLET ORAL 3 TIMES DAILY
Qty: 90 TABLET | Refills: 0 | Status: SHIPPED | OUTPATIENT
Start: 2021-01-20 | End: 2021-02-25 | Stop reason: SDUPTHER

## 2021-01-20 NOTE — TELEPHONE ENCOUNTER
Please tell Bill that I have refilled his hydrocodone, but due to the nature of the controlled substance, I will need him to schedule a pain management follow-up within a month, regardless of whether he has gotten his second opinion neurosurgery visit before then or not.  Thanks

## 2021-02-01 ENCOUNTER — CLINICAL SUPPORT (OUTPATIENT)
Dept: FAMILY MEDICINE CLINIC | Facility: CLINIC | Age: 51
End: 2021-02-01

## 2021-02-01 DIAGNOSIS — Z20.822 CLOSE EXPOSURE TO COVID-19 VIRUS: Primary | ICD-10-CM

## 2021-02-01 PROCEDURE — 99211 OFF/OP EST MAY X REQ PHY/QHP: CPT | Performed by: FAMILY MEDICINE

## 2021-02-01 NOTE — PROGRESS NOTES
Subjective   Ang Yoon is a 50 y.o. male.   No chief complaint on file.      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   • Hypogonadism 06/13/2018   • Morbid (severe) obesity due to excess calories (CMS/[facility]) 06/12/2018   • Other problems related to lifestyle 04/24/2018   • Regular astigmatism 06/01/2017   • Presbyopia 06/01/2017   • Myopia 06/01/2017   • Chronic coronary artery disease 09/19/2012   • Cardiac disease 09/19/2012   • Cyst of meniscus of knee 06/22/2012   • Resting tremor 03/05/2012   • Degenerative joint disease 02/08/2012   • Hypertension 01/18/2012   • Anxiety disorder 01/18/2012   • Obsessive-compulsive disorder 01/18/2012   • Memory loss 01/16/2012           Past Surgical History:   Procedure Laterality Date   • CARDIAC CATHETERIZATION  04/2018   • KNEE ARTHROSCOPY Right 07/09/2012     Current Outpatient Medications on File Prior to Visit   Medication Sig   • albuterol sulfate  (90 Base) MCG/ACT inhaler INHALE 2 PUFFS BY MOUTH EVERY 4 HOURS AS NEEDED FOR WHEEZING   • EQ Aspirin Adult Low Dose 81 MG EC tablet Take 1 tablet by mouth once daily   • fluticasone (Flonase) 50 MCG/ACT nasal spray 2 sprays into the nostril(s) as directed by provider Daily.   • gabapentin (NEURONTIN) 300 MG capsule Take 1 capsule by mouth 3 (Three) Times a Day.   • HYDROcodone-acetaminophen (Norco)  MG per tablet Take 1 tablet by mouth 3 (Three) Times a Day.   • isosorbide mononitrate (IMDUR) 30 MG 24 hr tablet TAKE 1 TABLET BY MOUTH ONCE DAILY IN THE MORNING   • melatonin 5 MG  tablet tablet Take 1 tablet by mouth At Night As Needed (insomnia).   • nitroglycerin (Nitrostat) 0.4 MG SL tablet Place 1 tablet under the tongue Every 5 (Five) Minutes As Needed for Chest Pain. Take no more than 3 doses in 15 minutes.   • propranolol (INDERAL) 80 MG tablet Take 1 tablet by mouth twice daily   • rosuvastatin (CRESTOR) 20 MG tablet TAKE 1 TABLET BY MOUTH ONCE DAILY AT BEDTIME   • traZODone (DESYREL) 50 MG tablet TAKE 1 TO 2 TABLETS BY MOUTH EVERY DAY AT BEDTIME AS NEEDED     No current facility-administered medications on file prior to visit.      Allergies   Allergen Reactions   • Niacin Rash     Social History     Socioeconomic History   • Marital status: Single     Spouse name: Not on file   • Number of children: Not on file   • Years of education: Not on file   • Highest education level: Not on file   Tobacco Use   • Smoking status: Former Smoker     Packs/day: 0.25     Types: Cigarettes   • Smokeless tobacco: Never Used   Substance and Sexual Activity   • Alcohol use: No     Frequency: Never   • Drug use: No   • Sexual activity: Defer     Family History   Problem Relation Age of Onset   • Heart disease Other    • Hypertension Other    • Stroke Other    • Prostate cancer Other    • Hearing loss Mother    • Asthma Brother    • Asthma Daughter    • Arthritis Maternal Grandmother    • Arthritis Maternal Grandfather    • Diabetes Maternal Grandfather    • Stroke Maternal Grandfather        Review of Systems    Objective   There were no vitals taken for this visit.  Physical Exam      Office Visit on 01/18/2021   Component Date Value Ref Range Status   • SARS-CoV-2, LORETTA 01/18/2021 Detected* Not Detected Final    Comment: This nucleic acid amplification test was developed and its performance  characteristics determined by [employer]. Nucleic acid  amplification tests include PCR and TMA. This test has not been FDA  cleared or approved. This test has been authorized by FDA under an  Emergency  Use Authorization (EUA). This test is only authorized for  the duration of time the declaration that circumstances exist  justifying the authorization of the emergency use of in vitro  diagnostic tests for detection of SARS-CoV-2 virus and/or diagnosis  of COVID-19 infection under section 564(b)(1) of the Act, 21 U.S.C.  360bbb-3(b) (1), unless the authorization is terminated or revoked  sooner.  When diagnostic testing is negative, the possibility of a false  negative result should be considered in the context of a patient's  recent exposures and the presence of clinical signs and symptoms  consistent with COVID-19. An individual without symptoms of COVID-19  and who is not shedding SARS-CoV-2 virus would                            expect to have a  negative (not detected) result in this assay.           Assessment/Plan   Diagnoses and all orders for this visit:    1. Close exposure to COVID-19 virus (Primary)  -     COVID-19,[facility] ROUTINE, NP/OP SWAB IN TRANSPORT MEDIA OR ESWAB 72 HR TAT - Swab, Nasopharynx                 No follow-ups on file.    Call with any problems or concerns before next visit  Answers for HPI/ROS submitted by the patient on 1/30/2021   What is the primary reason for your visit?: Other  Please describe your symptoms.: covid test. I was tested positive two weeks ago. , , Symptoms started going away, (except a persistent cough) 4 days ago. Would like to see if i am covid free so i can return to work.  Have you had these symptoms before?: Yes  How long have you been having these symptoms?: Greater than 2 weeks

## 2021-02-02 LAB — SARS-COV-2 RNA RESP QL NAA+PROBE: NOT DETECTED

## 2021-02-10 ENCOUNTER — OFFICE VISIT (OUTPATIENT)
Dept: FAMILY MEDICINE CLINIC | Facility: CLINIC | Age: 51
End: 2021-02-10

## 2021-02-10 VITALS
WEIGHT: 315 LBS | TEMPERATURE: 96.6 F | HEART RATE: 63 BPM | BODY MASS INDEX: 42.66 KG/M2 | HEIGHT: 72 IN | SYSTOLIC BLOOD PRESSURE: 122 MMHG | DIASTOLIC BLOOD PRESSURE: 78 MMHG | OXYGEN SATURATION: 96 %

## 2021-02-10 DIAGNOSIS — R05.9 COUGH: ICD-10-CM

## 2021-02-10 DIAGNOSIS — E66.01 CLASS 3 SEVERE OBESITY DUE TO EXCESS CALORIES WITH SERIOUS COMORBIDITY AND BODY MASS INDEX (BMI) OF 50.0 TO 59.9 IN ADULT (HCC): ICD-10-CM

## 2021-02-10 DIAGNOSIS — R06.02 SHORTNESS OF BREATH: Primary | ICD-10-CM

## 2021-02-10 DIAGNOSIS — J45.41 MODERATE PERSISTENT REACTIVE AIRWAY DISEASE WITH ACUTE EXACERBATION: ICD-10-CM

## 2021-02-10 DIAGNOSIS — Z86.16 HISTORY OF COVID-19: ICD-10-CM

## 2021-02-10 PROBLEM — E66.9 OBESITY: Status: RESOLVED | Noted: 2019-07-11 | Resolved: 2021-02-10

## 2021-02-10 PROBLEM — E66.813 CLASS 3 SEVERE OBESITY DUE TO EXCESS CALORIES WITH SERIOUS COMORBIDITY AND BODY MASS INDEX (BMI) OF 50.0 TO 59.9 IN ADULT: Status: ACTIVE | Noted: 2019-07-11

## 2021-02-10 PROCEDURE — 99214 OFFICE O/P EST MOD 30 MIN: CPT | Performed by: NURSE PRACTITIONER

## 2021-02-10 RX ORDER — GUAIFENESIN AND CODEINE PHOSPHATE 100; 10 MG/5ML; MG/5ML
5 SOLUTION ORAL 4 TIMES DAILY PRN
Qty: 240 ML | Refills: 1 | Status: SHIPPED | OUTPATIENT
Start: 2021-02-10 | End: 2021-03-19

## 2021-02-10 RX ORDER — ALBUTEROL SULFATE 90 UG/1
2 AEROSOL, METERED RESPIRATORY (INHALATION) EVERY 4 HOURS PRN
Qty: 18 G | Refills: 6 | Status: SHIPPED | OUTPATIENT
Start: 2021-02-10 | End: 2021-03-29 | Stop reason: SDUPTHER

## 2021-02-10 RX ORDER — PREDNISONE 5 MG/1
TABLET ORAL
Qty: 20 TABLET | Refills: 0 | Status: SHIPPED | OUTPATIENT
Start: 2021-02-10 | End: 2021-02-18

## 2021-02-10 RX ORDER — CLARITHROMYCIN 500 MG/1
500 TABLET, COATED ORAL 2 TIMES DAILY
Qty: 20 TABLET | Refills: 0 | Status: SHIPPED | OUTPATIENT
Start: 2021-02-10 | End: 2021-03-19

## 2021-02-10 NOTE — PROGRESS NOTES
"    Ang Yoon is a 50 y.o. male.     50-year-old grossly obese white male with chronic neck and back pain, coronary myocardial bridge, CAD, anxiety, obsessive-compulsive disorder, and reactive airway disease who was here on 18th January diagnosed with Covid.  Patient returns today stating he is very short of breath with some chest pain and has a nonproductive cough that is persistent and frequent.  He feels like he is wheezing however lungs did not have wheezes they are diminished.  He states he feels like he is having lung spasms where he gets very tight and cannot breathe.  I am placing him on antibiotics steroids inhaler and codeine cough syrup and getting a chest x-ray today.  I advised patient if symptoms do not improve he needs to follow-up with pulmonology to make sure he does not have any permanent lung damage from the Covid virus  Blood pressure 122/78 heart rate 62 denies any chest pain, dyspnea, tachycardia or dizziness  I will be checking CBC and CMP today since he has not had blood work in over a year and he will return for a fasting lipid panel   weight is up 4 pounds at 370 with a BMI of 50.2    Biaxin 500 mg twice daily x10 days  Prednisone 5 mg taper dose  Albuterol inhaler every 4 hours  Codeine cough syrup as needed for cough  Consider pulmonology follow-up  Blood work today  Chest x-ray today       The following portions of the patient's history were reviewed and updated as appropriate: allergies, current medications, past family history, past medical history, past social history, past surgical history and problem list.    Vitals:    02/10/21 1117   BP: 122/78   BP Location: Left arm   Patient Position: Sitting   Cuff Size: Large Adult   Pulse: 63   Temp: 96.6 °F (35.9 °C)   TempSrc: Temporal   SpO2: 96%   Weight: (!) 168 kg (369 lb 12.8 oz)   Height: 182.9 cm (72\")     Body mass index is 50.15 kg/m².    Past Medical History:   Diagnosis Date   • Arthritis    • CAD (coronary artery " disease)    • Dyspnea on exertion 07/16/2019   • Hyperlipidemia    • Hypertension    • Obesity    • Sleep apnea, obstructive      Past Surgical History:   Procedure Laterality Date   • CARDIAC CATHETERIZATION  04/2018   • KNEE ARTHROSCOPY Right 07/09/2012     Family History   Problem Relation Age of Onset   • Heart disease Other    • Hypertension Other    • Stroke Other    • Prostate cancer Other    • Hearing loss Mother    • Asthma Brother    • Asthma Daughter    • Arthritis Maternal Grandmother    • Arthritis Maternal Grandfather    • Diabetes Maternal Grandfather    • Stroke Maternal Grandfather      Immunization History   Administered Date(s) Administered   • Flu Vaccine Intradermal Quad 18-64YR 06/07/2012   • Influenza, Unspecified 12/16/2019   • Pneumococcal Polysaccharide (PPSV23) 06/07/2012   • Tdap 01/28/2020   • flucelvax quad pfs =>4 YRS 12/16/2019       Clinical Support on 02/01/2021   Component Date Value Ref Range Status   • SARS-CoV-2, LORETTA 02/01/2021 Not Detected  Not Detected Final    Comment: This nucleic acid amplification test was developed and its performance  characteristics determined by [employer]. Nucleic acid  amplification tests include RT-PCR and TMA. This test has not been  FDA cleared or approved. This test has been authorized by FDA under  an Emergency Use Authorization (EUA). This test is only authorized  for the duration of time the declaration that circumstances exist  justifying the authorization of the emergency use of in vitro  diagnostic tests for detection of SARS-CoV-2 virus and/or diagnosis  of COVID-19 infection under section 564(b)(1) of the Act, 21 U.S.C.  360bbb-3(b) (1), unless the authorization is terminated or revoked  sooner.  When diagnostic testing is negative, the possibility of a false  negative result should be considered in the context of a patient's  recent exposures and the presence of clinical signs and symptoms  consistent with COVID-19. An  individual without symptoms of COVID-19  and who is not shedding SARS-CoV-2 virus wo                           uld expect to have a  negative (not detected) result in this assay.           Review of Systems   Constitutional: Negative.    HENT: Negative.    Respiratory: Positive for cough and shortness of breath.    Cardiovascular: Negative.    Gastrointestinal: Negative.    Genitourinary: Negative.    Musculoskeletal: Negative.    Skin: Negative.    Neurological: Negative.    Psychiatric/Behavioral: Negative.        Objective   Physical Exam  Constitutional:       Appearance: Normal appearance.   HENT:      Head: Normocephalic.   Neck:      Musculoskeletal: Normal range of motion.   Cardiovascular:      Rate and Rhythm: Normal rate and regular rhythm.      Pulses: Normal pulses.      Heart sounds: Normal heart sounds.   Pulmonary:      Effort: Pulmonary effort is normal.      Comments: Lungs diminished but no wheezing or rhonchi  Abdominal:      General: Bowel sounds are normal.   Musculoskeletal: Normal range of motion.   Skin:     General: Skin is warm and dry.   Neurological:      General: No focal deficit present.      Mental Status: He is alert and oriented to person, place, and time.   Psychiatric:         Mood and Affect: Mood normal.         Procedures    Assessment/Plan   Diagnoses and all orders for this visit:    1. Shortness of breath (Primary)  -     XR Chest 2 View  -     CBC & Differential  -     Comprehensive Metabolic Panel  -     guaiFENesin-codeine (GUAIFENESIN AC) 100-10 MG/5ML liquid; Take 5 mL by mouth 4 (Four) Times a Day As Needed for Cough. 1-2 tsp every 4-6 hours as needed for cough. Monitor for drowsiness  Dispense: 240 mL; Refill: 1    2. Moderate persistent reactive airway disease with acute exacerbation    3. Class 3 severe obesity due to excess calories with serious comorbidity and body mass index (BMI) of 50.0 to 59.9 in adult (CMS/HCC)    4. Cough    5. History of COVID-19    Other  orders  -     predniSONE 5 MG (48) tablet therapy pack dose pack; Take 4 tablets by mouth Daily for 2 days, THEN 3 tablets Daily for 2 days, THEN 2 tablets Daily for 2 days, THEN 1 tablet Daily for 2 days.  Dispense: 20 tablet; Refill: 0  -     clarithromycin (Biaxin) 500 MG tablet; Take 1 tablet by mouth 2 (Two) Times a Day.  Dispense: 20 tablet; Refill: 0  -     albuterol sulfate HFA (Ventolin HFA) 108 (90 Base) MCG/ACT inhaler; Inhale 2 puffs Every 4 (Four) Hours As Needed for Wheezing.  Dispense: 18 g; Refill: 6          Current Outpatient Medications:   •  albuterol sulfate  (90 Base) MCG/ACT inhaler, INHALE 2 PUFFS BY MOUTH EVERY 4 HOURS AS NEEDED FOR WHEEZING, Disp: 9 g, Rfl: 0  •  EQ Aspirin Adult Low Dose 81 MG EC tablet, Take 1 tablet by mouth once daily, Disp: 30 tablet, Rfl: 3  •  fluticasone (Flonase) 50 MCG/ACT nasal spray, 2 sprays into the nostril(s) as directed by provider Daily., Disp: 1 bottle, Rfl: 2  •  gabapentin (NEURONTIN) 300 MG capsule, Take 1 capsule by mouth 3 (Three) Times a Day., Disp: 90 capsule, Rfl: 2  •  HYDROcodone-acetaminophen (Norco)  MG per tablet, Take 1 tablet by mouth 3 (Three) Times a Day., Disp: 90 tablet, Rfl: 0  •  isosorbide mononitrate (IMDUR) 30 MG 24 hr tablet, TAKE 1 TABLET BY MOUTH ONCE DAILY IN THE MORNING, Disp: 90 tablet, Rfl: 3  •  melatonin 5 MG tablet tablet, Take 1 tablet by mouth At Night As Needed (insomnia)., Disp: 90 tablet, Rfl: 2  •  nitroglycerin (Nitrostat) 0.4 MG SL tablet, Place 1 tablet under the tongue Every 5 (Five) Minutes As Needed for Chest Pain. Take no more than 3 doses in 15 minutes., Disp: 25 tablet, Rfl: 2  •  propranolol (INDERAL) 80 MG tablet, Take 1 tablet by mouth twice daily, Disp: 60 tablet, Rfl: 5  •  rosuvastatin (CRESTOR) 20 MG tablet, TAKE 1 TABLET BY MOUTH ONCE DAILY AT BEDTIME, Disp: 90 tablet, Rfl: 3  •  traZODone (DESYREL) 50 MG tablet, TAKE 1 TO 2 TABLETS BY MOUTH EVERY DAY AT BEDTIME AS NEEDED, Disp: 60  tablet, Rfl: 3  •  albuterol sulfate HFA (Ventolin HFA) 108 (90 Base) MCG/ACT inhaler, Inhale 2 puffs Every 4 (Four) Hours As Needed for Wheezing., Disp: 18 g, Rfl: 6  •  clarithromycin (Biaxin) 500 MG tablet, Take 1 tablet by mouth 2 (Two) Times a Day., Disp: 20 tablet, Rfl: 0  •  guaiFENesin-codeine (GUAIFENESIN AC) 100-10 MG/5ML liquid, Take 5 mL by mouth 4 (Four) Times a Day As Needed for Cough. 1-2 tsp every 4-6 hours as needed for cough. Monitor for drowsiness, Disp: 240 mL, Rfl: 1  •  predniSONE 5 MG (48) tablet therapy pack dose pack, Take 4 tablets by mouth Daily for 2 days, THEN 3 tablets Daily for 2 days, THEN 2 tablets Daily for 2 days, THEN 1 tablet Daily for 2 days., Disp: 20 tablet, Rfl: 0

## 2021-02-10 NOTE — PATIENT INSTRUCTIONS
Add 78939 Cpt? (Important Note: In 2017 The Use Of 97443 Is Being Tracked By Cms To Determine Future Global Period Reimbursement For Global Periods): yes Take antibiotics steroids as directed with food  Albuterol inhaler every 4 hours  Codeine cough syrup as needed for cough  Blood work and chest x-ray today  Consider pulmonary follow-up  Call office if no improvement   Detail Level: Detailed

## 2021-02-11 LAB
ALBUMIN SERPL-MCNC: 4 G/DL (ref 3.8–4.9)
ALBUMIN/GLOB SERPL: 1.6 {RATIO} (ref 1.2–2.2)
ALP SERPL-CCNC: 61 IU/L (ref 39–117)
ALT SERPL-CCNC: 27 IU/L (ref 0–44)
AST SERPL-CCNC: 27 IU/L (ref 0–40)
BASOPHILS # BLD AUTO: 0 X10E3/UL (ref 0–0.2)
BASOPHILS NFR BLD AUTO: 1 %
BILIRUB SERPL-MCNC: 0.5 MG/DL (ref 0–1.2)
BUN SERPL-MCNC: 16 MG/DL (ref 6–24)
BUN/CREAT SERPL: 20 (ref 9–20)
CALCIUM SERPL-MCNC: 9.5 MG/DL (ref 8.7–10.2)
CHLORIDE SERPL-SCNC: 103 MMOL/L (ref 96–106)
CO2 SERPL-SCNC: 26 MMOL/L (ref 20–29)
CREAT SERPL-MCNC: 0.79 MG/DL (ref 0.76–1.27)
EOSINOPHIL # BLD AUTO: 0.2 X10E3/UL (ref 0–0.4)
EOSINOPHIL NFR BLD AUTO: 3 %
ERYTHROCYTE [DISTWIDTH] IN BLOOD BY AUTOMATED COUNT: 13.4 % (ref 11.6–15.4)
GLOBULIN SER CALC-MCNC: 2.5 G/DL (ref 1.5–4.5)
GLUCOSE SERPL-MCNC: 114 MG/DL (ref 65–99)
HCT VFR BLD AUTO: 46.2 % (ref 37.5–51)
HGB BLD-MCNC: 15.5 G/DL (ref 13–17.7)
IMM GRANULOCYTES # BLD AUTO: 0.2 X10E3/UL (ref 0–0.1)
IMM GRANULOCYTES NFR BLD AUTO: 4 %
LYMPHOCYTES # BLD AUTO: 1.7 X10E3/UL (ref 0.7–3.1)
LYMPHOCYTES NFR BLD AUTO: 34 %
MCH RBC QN AUTO: 30.8 PG (ref 26.6–33)
MCHC RBC AUTO-ENTMCNC: 33.5 G/DL (ref 31.5–35.7)
MCV RBC AUTO: 92 FL (ref 79–97)
MONOCYTES # BLD AUTO: 0.5 X10E3/UL (ref 0.1–0.9)
MONOCYTES NFR BLD AUTO: 10 %
NEUTROPHILS # BLD AUTO: 2.4 X10E3/UL (ref 1.4–7)
NEUTROPHILS NFR BLD AUTO: 48 %
PLATELET # BLD AUTO: 192 X10E3/UL (ref 150–450)
POTASSIUM SERPL-SCNC: 5.3 MMOL/L (ref 3.5–5.2)
PROT SERPL-MCNC: 6.5 G/DL (ref 6–8.5)
RBC # BLD AUTO: 5.04 X10E6/UL (ref 4.14–5.8)
SODIUM SERPL-SCNC: 143 MMOL/L (ref 134–144)
WBC # BLD AUTO: 5 X10E3/UL (ref 3.4–10.8)

## 2021-02-25 ENCOUNTER — TELEPHONE (OUTPATIENT)
Dept: NEUROSURGERY | Facility: CLINIC | Age: 51
End: 2021-02-25

## 2021-02-25 DIAGNOSIS — M51.36 LUMBAR DEGENERATIVE DISC DISEASE: ICD-10-CM

## 2021-02-25 RX ORDER — HYDROCODONE BITARTRATE AND ACETAMINOPHEN 10; 325 MG/1; MG/1
1 TABLET ORAL 3 TIMES DAILY
Qty: 90 TABLET | Refills: 0 | Status: SHIPPED | OUTPATIENT
Start: 2021-02-25 | End: 2021-03-29 | Stop reason: SDUPTHER

## 2021-02-25 NOTE — TELEPHONE ENCOUNTER
Caller: JOCELYN BLACKMON     Relationship to patient: SELF    Best call back number: [phone]    Chief complaint: RESCHEDULE    Type of visit: NEW PATIENT     Requested date: NEXT AVAILABLE     If rescheduling, when is the original appointment: 01/13/21    Additional notes: PATIENT HAD TO CANCEL HIS ORIGINAL APPT DUE TO COVID-19, HE IS CALLING BACK TO RESCHEDULE. PLEASE ADVISE.     **PATIENT INFORMED ME HE SAW BETTY ROQUE WITH [facility] LAST YEAR 2020. RECORDS ARE IN CARE EVERYWHERE**

## 2021-03-15 ENCOUNTER — OFFICE VISIT (OUTPATIENT)
Dept: NEUROSURGERY | Facility: CLINIC | Age: 51
End: 2021-03-15

## 2021-03-15 VITALS
SYSTOLIC BLOOD PRESSURE: 117 MMHG | DIASTOLIC BLOOD PRESSURE: 73 MMHG | BODY MASS INDEX: 41.75 KG/M2 | WEIGHT: 315 LBS | HEART RATE: 64 BPM | HEIGHT: 73 IN

## 2021-03-15 DIAGNOSIS — M54.50 CHRONIC LOW BACK PAIN WITHOUT SCIATICA, UNSPECIFIED BACK PAIN LATERALITY: Primary | ICD-10-CM

## 2021-03-15 DIAGNOSIS — G89.29 CHRONIC LOW BACK PAIN WITHOUT SCIATICA, UNSPECIFIED BACK PAIN LATERALITY: Primary | ICD-10-CM

## 2021-03-15 PROCEDURE — 99203 OFFICE O/P NEW LOW 30 MIN: CPT | Performed by: SURGERY

## 2021-03-15 NOTE — PROGRESS NOTES
"Subjective   History of Present Illness: Ang Yoon is a 50 y.o. male seen in the neurosurgery clinic for the first time on 3/15/2021.  He is a 50-year-old with prior history of weight lifting about a year ago was involved in heavy lifting at work.  He apparently lifted heavy machinery and over 1000 pounds.  Days afterwards he asked develop back pain and fatigue which has since progressed to intermittent chronic back pain that is worse with activity.  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  When he sits down the pain improves.  He denies any bowel or bladder incontinence.  Pain is not worse when he coughs or sneezes.  He does get little more relief when he flexes his back particularly after being fatigued with standing for period of time.  His height is 6 feet weight 303 pounds..  Negative history for diabetes and hypertension.  Last imaging studies were well over a year ago.  He has been treated with PT in the past and also is being treated with pain management.  Currently takes 1-2 hydrocodone's per day supplement with naproxen daily.    History of Present Illness    The following portions of the patient's history were reviewed and updated as appropriate: allergies, current medications, past family history, past medical history, past social history, past surgical history and problem list.    Review of Systems   Constitutional: Negative.    HENT: Negative.    Respiratory: Negative.    Cardiovascular: Negative.    Gastrointestinal: Negative.    Musculoskeletal: Positive for back pain.   Neurological: Negative.        Objective     ./73 (BP Location: Left arm, Patient Position: Sitting, Cuff Size: Large Adult)   Pulse 64   Ht 185.4 cm (73\")   Wt (!) 150 kg (330 lb)   BMI 43.54 kg/m²  "   Body mass index is 43.54 kg/m².      Physical Exam  Constitutional:       Appearance: Normal appearance.   HENT:      Head: Normocephalic and atraumatic.   Eyes:      Extraocular Movements: Extraocular movements intact.   Pulmonary:      Effort: Pulmonary effort is normal.      Breath sounds: Normal breath sounds.   Abdominal:      Palpations: Abdomen is soft.   Musculoskeletal:         General: Tenderness present.      Cervical back: Normal range of motion and neck supple.   Neurological:      Mental Status: He is alert and oriented to person, place, and time.   Psychiatric:         Speech: Speech normal.       Neurologic Exam     Mental Status   Oriented to person, place, and time.   Speech: speech is normal   Level of consciousness: alert    Motor Exam 5/5 strength in legs.  Normal bulk and tone.  Elaine negative plantar response flexor.     Sensory Exam   Primary and cortical sensory modalities intact.         Assessment/Plan   Independent Review of Radiographic Studies:      I personally reviewed the images from the following studies.    No current imaging for review    Medical Decision Makin-year-old with chronic back pain.  Symptom complex may be consistent with lumbar claudication.  Patient is 6 feet 330 pounds with significant axial loading to the spine.  Myelopathy and no radiculopathy evident.  Will add minimal require updated imaging studies lumbar MRI and LS-spine films with flexion and extension with additional recommendations to follow.      Procedures      Diagnoses and all orders for this visit:    1. Chronic low back pain without sciatica, unspecified back pain laterality (Primary)  -     XR Spine Lumbar Complete With Flex & Ext; Future  -     MRI Lumbar Spine Without Contrast; Future      Return in about 3 weeks (around 2021).    This patient was examined wearing appropriate personal protective equipment.     Patient is a current tobacco user.  Recommendations for  cessation/avoidance of tobacco products.    Smoking increases the risk of heart disease, lung disease, vascular disease, stroke, and cancer. If you smoke, STOP!    For more information:  Quit Now [place]  1-800-QUIT-NOW  https://www.quitnowinMoz.[email or web address]      Patient's blood pressure was reviewed.  Recommendations for  a low-salt diet and exercise to maintain/improve BP in addition to taking any presribed medications.    Patient's BMI is above goal.  Recommendations for initiating a healthy, low-fat diet and exercise to improve BMI, overall health and wellbeing.             Peewee Toussaint MD  03/15/21  11:11 EDT

## 2021-03-19 ENCOUNTER — TELEMEDICINE (OUTPATIENT)
Dept: FAMILY MEDICINE CLINIC | Facility: CLINIC | Age: 51
End: 2021-03-19

## 2021-03-19 DIAGNOSIS — M51.36 LUMBAR DEGENERATIVE DISC DISEASE: ICD-10-CM

## 2021-03-19 DIAGNOSIS — M54.12 CERVICAL RADICULOPATHY: ICD-10-CM

## 2021-03-19 DIAGNOSIS — G89.29 CHRONIC MIDLINE LOW BACK PAIN WITH BILATERAL SCIATICA: Primary | ICD-10-CM

## 2021-03-19 DIAGNOSIS — M54.42 CHRONIC MIDLINE LOW BACK PAIN WITH BILATERAL SCIATICA: Primary | ICD-10-CM

## 2021-03-19 DIAGNOSIS — M54.41 CHRONIC MIDLINE LOW BACK PAIN WITH BILATERAL SCIATICA: Primary | ICD-10-CM

## 2021-03-19 PROCEDURE — 99213 OFFICE O/P EST LOW 20 MIN: CPT | Performed by: FAMILY MEDICINE

## 2021-03-19 NOTE — PROGRESS NOTES
Subjective   Ang Yoon is a 50 y.o. male.   Chief Complaint   Patient presents with   • Pain       History of Present Illness   You have chosen to receive care through a telehealth visit.  Do you consent to use a video/audio connection for your medical care today? Yes.  Patient recently met with -neurosurgeon.  Patient states the  is starting at the beginning of treating patient and he will order all new scans for the patient.  Above reviewed and verified.  Johnnie has very severe lumbar degenerative disc disease with bilateral sciatica and cervical degenerative disc disease with radiculopathy.  He was referred to [facility] [place] last year.  I believe it has been over 6 months ago.  He has been seen by the nurse practitioner there.  He has been referred for various injections in his neck and back.  He went to physical therapy but they stopped because he was having severe symptoms with manipulation of his upper back.  Johnnie tells me he has yet to see the physician at [facility].  We finally worked on making a referral for another opinion with one of our spine surgeons through [facility].  Fortunately, Dr. Toussaint is now seeing patients in the [place] area and was able to see Johnnie yesterday.  Dr. Toussaint plans to do some additional imaging and basically start over with a repeat evaluation.    With regard to his pain, Johnnie states that his pain is reasonably well controlled with 2 Norco per day and 2 Aleve per day.  He is limited in how long he can stand at one time.  He is still not working full-time.  If he has to walk very far he will have to stop and rest.  He keeps his medication secure.  He has never called in early for medications.        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   • Hypogonadism 06/13/2018   • Other problems related to lifestyle 04/24/2018   • Regular astigmatism 06/01/2017   • Presbyopia 06/01/2017   • Myopia 06/01/2017   • Chronic coronary artery disease 09/19/2012   • Cardiac disease 09/19/2012   • Cyst of meniscus of knee 06/22/2012   • Resting tremor 03/05/2012   • Degenerative joint disease 02/08/2012   • Hypertension 01/18/2012   • Anxiety disorder 01/18/2012   • Obsessive-compulsive disorder 01/18/2012   • Memory loss 01/16/2012           Past Surgical History:   Procedure Laterality Date   • CARDIAC CATHETERIZATION  04/2018   • KNEE ARTHROSCOPY Right 07/09/2012     Current Outpatient Medications on File Prior to Visit   Medication Sig   • albuterol sulfate HFA (Ventolin HFA) 108 (90 Base) MCG/ACT inhaler Inhale 2 puffs Every 4 (Four) Hours As Needed for Wheezing.   • albuterol sulfate  (90 Base) MCG/ACT inhaler INHALE 2 PUFFS BY MOUTH EVERY 4 HOURS AS NEEDED FOR WHEEZING   • EQ Aspirin Adult Low Dose 81 MG EC tablet Take 1 tablet by mouth once daily   • fluticasone (Flonase) 50 MCG/ACT nasal spray 2 sprays into the nostril(s) as directed by provider Daily.   • gabapentin (NEURONTIN) 300 MG capsule Take 1 capsule by mouth 3 (Three) Times a Day.   • HYDROcodone-acetaminophen (Norco)  MG per tablet Take 1 tablet by mouth 3 (Three) Times a Day.   • isosorbide mononitrate (IMDUR) 30 MG 24 hr tablet TAKE 1 TABLET BY MOUTH ONCE DAILY IN THE MORNING   • melatonin 5 MG tablet tablet Take 1 tablet by mouth At Night As Needed (insomnia).   • nitroglycerin (Nitrostat) 0.4 MG SL tablet Place 1 tablet under the tongue Every 5 (Five) Minutes  As Needed for Chest Pain. Take no more than 3 doses in 15 minutes.   • propranolol (INDERAL) 80 MG tablet Take 1 tablet by mouth twice daily   • rosuvastatin (CRESTOR) 20 MG tablet TAKE 1 TABLET BY MOUTH ONCE DAILY AT BEDTIME   • traZODone (DESYREL) 50 MG tablet TAKE 1 TO 2 TABLETS BY MOUTH EVERY DAY AT BEDTIME AS NEEDED   • [DISCONTINUED] clarithromycin (Biaxin) 500 MG tablet Take 1 tablet by mouth 2 (Two) Times a Day.   • [DISCONTINUED] guaiFENesin-codeine (GUAIFENESIN AC) 100-10 MG/5ML liquid Take 5 mL by mouth 4 (Four) Times a Day As Needed for Cough. 1-2 tsp every 4-6 hours as needed for cough. Monitor for drowsiness     No current facility-administered medications on file prior to visit.     Allergies   Allergen Reactions   • Niacin Rash     Social History     Socioeconomic History   • Marital status: Single     Spouse name: Not on file   • Number of children: Not on file   • Years of education: Not on file   • Highest education level: Not on file   Tobacco Use   • Smoking status: Former Smoker     Packs/day: 0.25     Types: Cigarettes   • Smokeless tobacco: Never Used   Vaping Use   • Vaping Use: Never used   Substance and Sexual Activity   • Alcohol use: No   • Drug use: No   • Sexual activity: Defer     Family History   Problem Relation Age of Onset   • Heart disease Other    • Hypertension Other    • Stroke Other    • Prostate cancer Other    • Hearing loss Mother    • Asthma Brother    • Asthma Daughter    • Arthritis Maternal Grandmother    • Arthritis Maternal Grandfather    • Diabetes Maternal Grandfather    • Stroke Maternal Grandfather        Review of Systems    Objective   There were no vitals taken for this visit.  Physical Exam  Constitutional:       General: He is not in acute distress.     Appearance: He is well-developed.   Pulmonary:      Effort: Pulmonary effort is normal.   Neurological:      Mental Status: He is alert and oriented to person, place, and time.   Psychiatric:         Speech:  Speech normal.         Judgment: Judgment normal.           Office Visit on 02/10/2021   Component Date Value Ref Range Status   • WBC 02/10/2021 5.0  3.4 - 10.8 x10E3/uL Final   • RBC 02/10/2021 5.04  4.14 - 5.80 x10E6/uL Final   • Hemoglobin 02/10/2021 15.5  13.0 - 17.7 g/dL Final   • Hematocrit 02/10/2021 46.2  37.5 - 51.0 % Final   • MCV 02/10/2021 92  79 - 97 fL Final   • MCH 02/10/2021 30.8  26.6 - 33.0 pg Final   • MCHC 02/10/2021 33.5  31.5 - 35.7 g/dL Final   • RDW 02/10/2021 13.4  11.6 - 15.4 % Final   • Platelets 02/10/2021 192  150 - 450 x10E3/uL Final   • Neutrophil Rel % 02/10/2021 48  Not Estab. % Final   • Lymphocyte Rel % 02/10/2021 34  Not Estab. % Final   • Monocyte Rel % 02/10/2021 10  Not Estab. % Final   • Eosinophil Rel % 02/10/2021 3  Not Estab. % Final   • Basophil Rel % 02/10/2021 1  Not Estab. % Final   • Neutrophils Absolute 02/10/2021 2.4  1.4 - 7.0 x10E3/uL Final   • Lymphocytes Absolute 02/10/2021 1.7  0.7 - 3.1 x10E3/uL Final   • Monocytes Absolute 02/10/2021 0.5  0.1 - 0.9 x10E3/uL Final   • Eosinophils Absolute 02/10/2021 0.2  0.0 - 0.4 x10E3/uL Final   • Basophils Absolute 02/10/2021 0.0  0.0 - 0.2 x10E3/uL Final   • Immature Granulocyte Rel % 02/10/2021 4  Not Estab. % Final   • Immature Grans Absolute 02/10/2021 0.2* 0.0 - 0.1 x10E3/uL Final    Comment: (An elevated percentage of Immature Granulocytes has not been found  to be clinically significant as a sole clinical predictor of disease.  Does NOT include bands or blast cells.  Pregnancy associated  physiological leukocytosis may also show increased immature  granulocytes without clinical significance.)     • Glucose 02/10/2021 114* 65 - 99 mg/dL Final   • BUN 02/10/2021 16  6 - 24 mg/dL Final   • Creatinine 02/10/2021 0.79  0.76 - 1.27 mg/dL Final   • eGFR Non  Am 02/10/2021 104  >59 mL/min/1.73 Final   • eGFR African Am 02/10/2021 120  >59 mL/min/1.73 Final   • BUN/Creatinine Ratio 02/10/2021 20  9 - 20 Final   •  Sodium 02/10/2021 143  134 - 144 mmol/L Final   • Potassium 02/10/2021 5.3* 3.5 - 5.2 mmol/L Final   • Chloride 02/10/2021 103  96 - 106 mmol/L Final   • Total CO2 02/10/2021 26  20 - 29 mmol/L Final   • Calcium 02/10/2021 9.5  8.7 - 10.2 mg/dL Final   • Total Protein 02/10/2021 6.5  6.0 - 8.5 g/dL Final   • Albumin 02/10/2021 4.0  3.8 - 4.9 g/dL Final   • Globulin 02/10/2021 2.5  1.5 - 4.5 g/dL Final   • A/G Ratio 02/10/2021 1.6  1.2 - 2.2 Final   • Total Bilirubin 02/10/2021 0.5  0.0 - 1.2 mg/dL Final   • Alkaline Phosphatase 02/10/2021 61  39 - 117 IU/L Final   • AST (SGOT) 02/10/2021 27  0 - 40 IU/L Final   • ALT (SGPT) 02/10/2021 27  0 - 44 IU/L Final   Clinical Support on 02/01/2021   Component Date Value Ref Range Status   • SARS-CoV-2, LORETTA 02/01/2021 Not Detected  Not Detected Final    Comment: This nucleic acid amplification test was developed and its performance  characteristics determined by [employer]. Nucleic acid  amplification tests include RT-PCR and TMA. This test has not been  FDA cleared or approved. This test has been authorized by FDA under  an Emergency Use Authorization (EUA). This test is only authorized  for the duration of time the declaration that circumstances exist  justifying the authorization of the emergency use of in vitro  diagnostic tests for detection of SARS-CoV-2 virus and/or diagnosis  of COVID-19 infection under section 564(b)(1) of the Act, 21 U.S.C.  360bbb-3(b) (1), unless the authorization is terminated or revoked  sooner.  When diagnostic testing is negative, the possibility of a false  negative result should be considered in the context of a patient's  recent exposures and the presence of clinical signs and symptoms  consistent with COVID-19. An individual without symptoms of COVID-19  and who is not shedding SARS-CoV-2 virus wo                           uld expect to have a  negative (not detected) result in this assay.     Office Visit on 01/18/2021    Component Date Value Ref Range Status   • SARS-CoV-2, LORETTA 01/18/2021 Detected* Not Detected Final    Comment: This nucleic acid amplification test was developed and its performance  characteristics determined by [facility]. Nucleic acid  amplification tests include PCR and TMA. This test has not been FDA  cleared or approved. This test has been authorized by FDA under an  Emergency Use Authorization (EUA). This test is only authorized for  the duration of time the declaration that circumstances exist  justifying the authorization of the emergency use of in vitro  diagnostic tests for detection of SARS-CoV-2 virus and/or diagnosis  of COVID-19 infection under section 564(b)(1) of the Act, 21 U.S.C.  360bbb-3(b) (1), unless the authorization is terminated or revoked  sooner.  When diagnostic testing is negative, the possibility of a false  negative result should be considered in the context of a patient's  recent exposures and the presence of clinical signs and symptoms  consistent with COVID-19. An individual without symptoms of COVID-19  and who is not shedding SARS-CoV-2 virus would                            expect to have a  negative (not detected) result in this assay.           Assessment/Plan   Diagnoses and all orders for this visit:    1. Chronic midline low back pain with bilateral sciatica (Primary)    2. Lumbar degenerative disc disease    3. Cervical radiculopathy    Keep follow-up with Dr. Toussaint per his instructions.  We will plan follow-up here in the office depending upon what happens at the next visit.  We will continue his current hydrocodone up to twice a day along with Aleve.             Return if symptoms worsen or fail to improve.    Call with any problems or concerns before next visit

## 2021-03-29 DIAGNOSIS — M51.36 LUMBAR DEGENERATIVE DISC DISEASE: ICD-10-CM

## 2021-03-29 RX ORDER — TRAZODONE HYDROCHLORIDE 50 MG/1
TABLET ORAL
Qty: 60 TABLET | Refills: 3 | Status: SHIPPED | OUTPATIENT
Start: 2021-03-29 | End: 2022-06-20

## 2021-03-29 RX ORDER — ALBUTEROL SULFATE 90 UG/1
2 AEROSOL, METERED RESPIRATORY (INHALATION) EVERY 4 HOURS PRN
Qty: 18 G | Refills: 6 | Status: SHIPPED | OUTPATIENT
Start: 2021-03-29 | End: 2021-05-14

## 2021-03-29 RX ORDER — HYDROCODONE BITARTRATE AND ACETAMINOPHEN 10; 325 MG/1; MG/1
1 TABLET ORAL 3 TIMES DAILY
Qty: 90 TABLET | Refills: 0 | Status: SHIPPED | OUTPATIENT
Start: 2021-03-29 | End: 2021-05-21 | Stop reason: SDUPTHER

## 2021-03-31 ENCOUNTER — APPOINTMENT (OUTPATIENT)
Dept: MRI IMAGING | Facility: HOSPITAL | Age: 51
End: 2021-03-31

## 2021-04-08 ENCOUNTER — HOSPITAL ENCOUNTER (OUTPATIENT)
Dept: MRI IMAGING | Facility: HOSPITAL | Age: 51
Discharge: HOME | End: 2021-04-08
Admitting: SURGERY

## 2021-04-08 DIAGNOSIS — G89.29 CHRONIC LOW BACK PAIN WITHOUT SCIATICA, UNSPECIFIED BACK PAIN LATERALITY: ICD-10-CM

## 2021-04-08 DIAGNOSIS — M54.50 CHRONIC LOW BACK PAIN WITHOUT SCIATICA, UNSPECIFIED BACK PAIN LATERALITY: ICD-10-CM

## 2021-04-08 PROCEDURE — 72148 MRI LUMBAR SPINE W/O DYE: CPT

## 2021-04-26 ENCOUNTER — TELEPHONE (OUTPATIENT)
Dept: NEUROSURGERY | Facility: CLINIC | Age: 51
End: 2021-04-26

## 2021-04-26 NOTE — TELEPHONE ENCOUNTER
Caller: Ang Yoon    Relationship: Self    Best call back number: [phone]    Caller requesting test results: YES    What test was performed: MRI    When was the test performed: 04/08/21    Where was the test performed: [facility]    Additional notes: PATIENT AWAITING FOR AN UPDATE SINCE NO CALLBACK RECEIVED YET PRIOR TO CALL. PATIENT INQUIRING ON FURTHER DIRECTION REGARDING PAIN MGMT & WOULD LIKE TO CONSULT WITH DR. JOHNSON. UNABLE TO ASSIST AT TIME OF CALL DUE TO PROVIDER SCHEDULING RESTRICTIONS. Office Visit with Peewee Johnson MD (03/15/2021)  HOW WOULD PROVIDER LIKE PROCEED?     PATIENT CAN BE CONTACTED WITH FURTHER ASSISTANCE.

## 2021-05-12 ENCOUNTER — OFFICE VISIT (OUTPATIENT)
Dept: NEUROSURGERY | Facility: CLINIC | Age: 51
End: 2021-05-12

## 2021-05-12 VITALS
SYSTOLIC BLOOD PRESSURE: 138 MMHG | HEART RATE: 63 BPM | HEIGHT: 73 IN | WEIGHT: 315 LBS | DIASTOLIC BLOOD PRESSURE: 77 MMHG | BODY MASS INDEX: 41.75 KG/M2

## 2021-05-12 DIAGNOSIS — G89.29 CHRONIC MIDLINE LOW BACK PAIN WITHOUT SCIATICA: Primary | ICD-10-CM

## 2021-05-12 DIAGNOSIS — M54.50 CHRONIC MIDLINE LOW BACK PAIN WITHOUT SCIATICA: Primary | ICD-10-CM

## 2021-05-12 PROCEDURE — 99213 OFFICE O/P EST LOW 20 MIN: CPT | Performed by: SURGERY

## 2021-05-12 NOTE — PROGRESS NOTES
"Subjective   History of Present Illness: Ang Yoon is a 50 y.o. male neurosurgery clinic for follow-up on 5/12/2021.  The last time lungs 3/17/2021 with present history of back pain.  Patient is a 50-year-old with a prior history of weightlifting about a year ago, involved in heavy lifting at work.    Apparently he lifted heavy machinery over 1000 pounds and has since developed back pain.  We evaluated him he was neurologically intact and a number of diagnostic imaging tests were ordered.  Lumbar flexion-extension have not been completed, but the lumbar MRI obtained on 4/8/2021 showed degenerative disc changes throughout, with spondylosis.  At L5-S1 there is no significant central canal stenosis but there is mild bilateral neuroforaminal narrowing.  At L4-L5 the canal is borderline stenotic and 10 mm with mild neuroforaminal narrowing.  At L3/4 and L2/3 and L1-L2 and at T12-L1 there is no significant canal stenosis.  Patient denies any leg weakness numbness or bowel or bladder incontinence.  Lumbar claudication by history was not elicited with this most current evaluation.  Patient states that he also has midthoracic pain, without any radiating pain in his legs and without any rib pain.    History of Present Illness    The following portions of the patient's history were reviewed and updated as appropriate: allergies, current medications, past family history, past medical history, past social history, past surgical history and problem list.    Review of Systems   Constitutional: Negative.    HENT: Negative.    Respiratory: Negative.    Cardiovascular: Negative.    Musculoskeletal: Positive for back pain.   Neurological: Negative.        Objective     ./77 (BP Location: Left arm, Patient Position: Sitting, Cuff Size: Large Adult)   Pulse 63   Ht 185.4 cm (73\")   Wt (!) 167 kg (368 lb)   BMI 48.55 kg/m²    Body mass index is 48.55 kg/m².      Physical Exam  Neurologic Exam  Patient is 330 pounds " with a body mass index of 43.54.  Normal range of motion of lumbar spine, with some point tenderness in the midthoracic region.  Otherwise without evidence for myelopathy or radiculopathy with good bulk and tone and no sensory loss.      Assessment/Plan   Independent Review of Radiographic Studies:      I personally reviewed the images from the following studies.    Study Result    Narrative & Impression   DATE OF EXAM:  4/8/2021 12:10 PM     PROCEDURE:  MRI LUMBAR SPINE WO CONTRAST-     INDICATIONS:  Lifting injury, chronic low back pain, bilateral lower extremity pain  and numbness, difficulty walking.     COMPARISON:  Lumbar spine x-ray performed on 5/15/2020.     TECHNIQUE:   Routine magnetic resonance imaging of the lumbar spine was performed  without the administration of contrast.     Several lumbars disc are dehydrated. There is loss of disc height and  signal at L5-S1 indicating degenerative disc disease. There is a  degenerative Schmorl's node involving the inferior endplate of T12.  There is no marrow edema to indicate an acute bony abnormality. The  patient has mild multilevel endplate spurring indicating spondylosis.  The conus medullaris terminates at the mid L1 vertebral level and is  within range of normal. There is no paraspinal hematoma or fluid  collection.     L5-S1:  There is a broad-based disc osteophyte complex with facet  hypertrophic degenerative changes and ligamentum flavum thickening.  There is no significant central canal stenosis. There is bilateral  neural foraminal narrowing with questionable contact of the exiting  nerve root sleeves raising question of impingement.     L4-L5:  There is a broad-based disc osteophyte complex with facet  arthritis and ligamentum flavum thickening. The canal is borderline  stenotic measuring 10 mm. There is bilateral neural foraminal narrowing  without definite nerve root impingement.     L3-L4:  There are facet hypertrophic degenerative changes and  a  broad-based disc osteophyte complex. There is no significant central  canal stenosis. There is mild neural foraminal narrowing without nerve  root impingement.     L2-L3:  There are facet arthritic changes and ligamentum flavum  thickening and a minor disc osteophyte complex. There is no significant  canal or neural foraminal stenosis.     L1-L2:  There is facet arthritis and ligamentum flavum thickening. There  is no herniated disc, canal, or neural foraminal stenosis.     T12-L1:  There is facet arthritis and slight ligamentum flavum  thickening. There is no herniated disc, canal, or neural foraminal  stenosis.      IMPRESSION:  IMPRESSION :      1. Degenerative changes as described above.  2. Varying degrees of neural foraminal narrowing and canal stenosis.  Level by level analysis as discussed above.            Medical Decision Making:    EMR imaging studies have been reviewed.  Patient been seen and evaluated.  Clinical summary results discussed with the patient.      Patient has chronic back pain, chronic probably secondary to heavy lifting.  Probably musculoskeletal in origin as there is no definitive surgical lesions in the lumbar spine to address.  Patient noted with a complaints of midthoracic pain, without radicular pain, rib pain with intact neurological examination.  I suggested obtaining thoracic MRI for further evaluation.  Pt says that he like to have the MRI obtained with subsequent organization of multifaceted care by his PCP Dr. Bass.        Procedures      There are no diagnoses linked to this encounter.  No follow-ups on file.    This patient was examined wearing appropriate personal protective equipment.              Peewee Toussaint MD  05/12/21  10:57 EDT

## 2021-05-14 ENCOUNTER — OFFICE VISIT (OUTPATIENT)
Dept: FAMILY MEDICINE CLINIC | Facility: CLINIC | Age: 51
End: 2021-05-14

## 2021-05-14 VITALS
SYSTOLIC BLOOD PRESSURE: 148 MMHG | HEART RATE: 75 BPM | WEIGHT: 315 LBS | DIASTOLIC BLOOD PRESSURE: 90 MMHG | HEIGHT: 73 IN | OXYGEN SATURATION: 95 % | TEMPERATURE: 95 F | BODY MASS INDEX: 41.75 KG/M2

## 2021-05-14 DIAGNOSIS — E66.01 CLASS 3 SEVERE OBESITY DUE TO EXCESS CALORIES WITH SERIOUS COMORBIDITY AND BODY MASS INDEX (BMI) OF 50.0 TO 59.9 IN ADULT (HCC): ICD-10-CM

## 2021-05-14 DIAGNOSIS — M54.6 CHRONIC MIDLINE THORACIC BACK PAIN: Primary | ICD-10-CM

## 2021-05-14 DIAGNOSIS — G89.29 CHRONIC MIDLINE THORACIC BACK PAIN: Primary | ICD-10-CM

## 2021-05-14 PROCEDURE — 99214 OFFICE O/P EST MOD 30 MIN: CPT | Performed by: FAMILY MEDICINE

## 2021-05-14 RX ORDER — LIDOCAINE 50 MG/G
1 PATCH TOPICAL EVERY 24 HOURS
Qty: 30 PATCH | Refills: 1 | Status: SHIPPED | OUTPATIENT
Start: 2021-05-14 | End: 2022-04-12

## 2021-05-14 NOTE — PROGRESS NOTES
Subjective   Ang Yoon is a 50 y.o. male.   Chief Complaint   Patient presents with   • Back Pain       History of Present Illness   Patient presents today regarding his back pain. He has recently had his neurosurgeon consult.  Above reviewed and verified.  Johnnie presents to the office today to talk about what he should do next.  He had been seeing [facility] neurosurgery at [facility] [place].  Conclusion of Dr. Toussaint was that he did not have an operative problem in his lower back.  Johnnie mentioned his mid thoracic pain and felt more comfortable talking to me about that and coordinating any follow-up care as needed based on those results.    We also talked about the fact that he has been dealing with back problems of some sort for over a year.  He does not want to stay on Norco indefinitely.  It sure seems he is not an operative candidate.    With regard to this pain he has in his mid thoracic back if there is pressure applied to that area causes intense nausea.  He will jump and almost spasm if touched there.  He states it really does not make the pain worse it is more of a reflex.  Pain intensifies if he leans back or puts any pressure on his back.  If he moves around for anywhere more than 10 minutes the pain, more in his upper back gets worse until he has to sit down for a while.  It will then ease up as long as he does not lean back.  There is no radiation of this upper pain down into his legs.  No loss of bowel or bladder control.  Still has good muscle strength.    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   • Hypogonadism 06/13/2018   • Other problems related to lifestyle 04/24/2018   • Regular astigmatism 06/01/2017   • Presbyopia 06/01/2017   • Myopia 06/01/2017   • Chronic coronary artery disease 09/19/2012   • Cardiac disease 09/19/2012   • Cyst of meniscus of knee 06/22/2012   • Resting tremor 03/05/2012   • Degenerative joint disease 02/08/2012   • Hypertension 01/18/2012   • Anxiety disorder 01/18/2012   • Obsessive-compulsive disorder 01/18/2012   • Memory loss 01/16/2012           Past Surgical History:   Procedure Laterality Date   • CARDIAC CATHETERIZATION  04/2018   • KNEE ARTHROSCOPY Right 07/09/2012     Current Outpatient Medications on File Prior to Visit   Medication Sig   • albuterol sulfate  (90 Base) MCG/ACT inhaler INHALE 2 PUFFS BY MOUTH EVERY 4 HOURS AS NEEDED FOR WHEEZING   • EQ Aspirin Adult Low Dose 81 MG EC tablet Take 1 tablet by mouth once daily   • fluticasone (Flonase) 50 MCG/ACT nasal spray 2 sprays into the nostril(s) as directed by provider Daily.   • gabapentin (NEURONTIN) 300 MG capsule Take 1 capsule by mouth 3 (Three) Times a Day.   • HYDROcodone-acetaminophen (Norco)  MG per tablet Take 1 tablet by mouth 3 (Three) Times a Day.   • isosorbide mononitrate (IMDUR) 30 MG 24 hr tablet TAKE 1 TABLET BY MOUTH ONCE DAILY IN THE MORNING   • melatonin 5 MG tablet tablet Take 1 tablet by mouth At Night As Needed (insomnia).   • nitroglycerin (Nitrostat) 0.4 MG SL tablet Place 1 tablet under the tongue Every 5 (Five) Minutes As Needed for Chest Pain. Take no more than 3 doses in 15 minutes.   • propranolol (INDERAL) 80 MG tablet Take 1 tablet by mouth twice daily   • rosuvastatin (CRESTOR) 20 MG tablet TAKE 1 TABLET BY MOUTH ONCE DAILY AT BEDTIME   • traZODone (DESYREL) 50 MG tablet TAKE 1 TO 2 TABLETS BY MOUTH EVERY DAY AT BEDTIME AS NEEDED   • [DISCONTINUED]  "albuterol sulfate HFA (Ventolin HFA) 108 (90 Base) MCG/ACT inhaler Inhale 2 puffs Every 4 (Four) Hours As Needed for Wheezing.     No current facility-administered medications on file prior to visit.     Allergies   Allergen Reactions   • Niacin Rash     Social History     Socioeconomic History   • Marital status: Single     Spouse name: Not on file   • Number of children: Not on file   • Years of education: Not on file   • Highest education level: Not on file   Tobacco Use   • Smoking status: Former Smoker     Packs/day: 0.25     Types: Cigarettes   • Smokeless tobacco: Never Used   Vaping Use   • Vaping Use: Never used   Substance and Sexual Activity   • Alcohol use: No   • Drug use: No   • Sexual activity: Defer     Family History   Problem Relation Age of Onset   • Heart disease Other    • Hypertension Other    • Stroke Other    • Prostate cancer Other    • Hearing loss Mother    • Asthma Brother    • Asthma Daughter    • Arthritis Maternal Grandmother    • Arthritis Maternal Grandfather    • Diabetes Maternal Grandfather    • Stroke Maternal Grandfather        Review of Systems    Objective   /90 (BP Location: Left arm, Patient Position: Sitting, Cuff Size: Adult) Comment (BP Location): forearm  Pulse 75   Temp 95 °F (35 °C) (Infrared)   Ht 185.4 cm (72.99\")   Wt (!) 167 kg (368 lb)   SpO2 95%   BMI 48.56 kg/m²   Physical Exam  Constitutional:       General: He is not in acute distress.     Appearance: He is well-developed. He is obese.      Comments: Wearing a face mask  Looks a bit uncomfortable sitting in the exam chair.  Sits forward to avoid putting any pressure on his upper back.  Has to change positions frequently.   HENT:      Head: Normocephalic and atraumatic.   Eyes:      Conjunctiva/sclera: Conjunctivae normal.   Cardiovascular:      Rate and Rhythm: Normal rate.   Pulmonary:      Effort: Pulmonary effort is normal.   Musculoskeletal:         General: Normal range of motion.      " Cervical back: Normal range of motion.      Comments: If I touch an area over approximately T6-T7 on his upper back he will jump in spasm.  I did not appreciate any significant muscle knots or spasms back there.   Skin:     General: Skin is warm and dry.      Findings: No rash.   Neurological:      Mental Status: He is alert and oriented to person, place, and time.   Psychiatric:         Behavior: Behavior normal.       Assessment/Plan   Diagnoses and all orders for this visit:    1. Chronic midline thoracic back pain (Primary)  -     MRI Thoracic Spine Without Contrast; Future  -     lidocaine (LIDODERM) 5 %; Place 1 patch on the skin as directed by provider Daily. Remove & Discard patch within 12 hours or as directed by MD  Dispense: 30 patch; Refill: 1    2. Class 3 severe obesity due to excess calories with serious comorbidity and body mass index (BMI) of 50.0 to 59.9 in adult (CMS/[facility])  -     Ambulatory Referral to Bariatric Surgery    It appears he is not a candidate for any type of low back surgery.  He is comfortable with this as well.  His mid thoracic pain to palpation is still not fully evaluated.  He has seen 2 different neurosurgery groups regarding both his lumbar and cervical spine.  She does not have much pain in his neck now.  Johnnie tells me that the major pain is in his thoracic back.  We do not know for sure that he does not have a tumor there or some type of discitis.  I think we should proceed with an MRI of his thoracic spine.  I would like to follow-up with him after that and review this with him.  We will try Lidoderm patch on the painful area of his upper back along with the hydrocodone that he takes.  I do think he is a good candidate for interventional pain management.  The steroid injections he had did not work.  I think he needs someone to take a fresh approach to treating the pain that is most bothersome for him.    We also had a cinthya discussion regarding his weight.  Without a doubt  that contributes to not only his low back pain, but also to his blood pressure, cholesterol and sleep apnea.  Unquestionably, weight loss would be the single most significant event to improve the most medical problems.  I recommended we go ahead get him set up to see the bariatric center.  We will hold off on a pain management referral until we have seen the MRI so we can ask the best question of her specialist.  We will make a final decision about who to see at our follow-up visit.             Call with any problems or concerns before next visit  Return for I want to see Ang snow a few days after his thoracic MRI is done..      Much of this report is an electronic transcription of spoken language to printed text using Dragon dictation software.  As such, the subtleties and finesse of spoken language may permit erroneous, or at times, nonsensical words or phrases to be inadvertently transcribed; thus changes may be made at a later date to rectify these errors.

## 2021-05-20 DIAGNOSIS — M51.36 LUMBAR DEGENERATIVE DISC DISEASE: ICD-10-CM

## 2021-05-21 ENCOUNTER — TELEPHONE (OUTPATIENT)
Dept: FAMILY MEDICINE CLINIC | Facility: CLINIC | Age: 51
End: 2021-05-21

## 2021-05-21 DIAGNOSIS — M51.36 LUMBAR DEGENERATIVE DISC DISEASE: ICD-10-CM

## 2021-05-21 RX ORDER — PROPRANOLOL HYDROCHLORIDE 80 MG/1
TABLET ORAL
Qty: 60 TABLET | Refills: 5 | Status: SHIPPED | OUTPATIENT
Start: 2021-05-21 | End: 2021-11-08

## 2021-05-21 RX ORDER — HYDROCODONE BITARTRATE AND ACETAMINOPHEN 10; 325 MG/1; MG/1
1 TABLET ORAL 3 TIMES DAILY
Qty: 90 TABLET | Refills: 0 | Status: SHIPPED | OUTPATIENT
Start: 2021-05-21 | End: 2021-07-08 | Stop reason: SDUPTHER

## 2021-05-21 RX ORDER — GABAPENTIN 300 MG/1
CAPSULE ORAL
Qty: 90 CAPSULE | Refills: 5 | Status: SHIPPED | OUTPATIENT
Start: 2021-05-21 | End: 2022-03-14

## 2021-05-21 NOTE — TELEPHONE ENCOUNTER
.    Caller: Ang Yoon    Relationship: Self    Best call back number: [phone]     Medication needed:   Requested Prescriptions     Pending Prescriptions Disp Refills   • HYDROcodone-acetaminophen (Norco)  MG per tablet 90 tablet 0     Sig: Take 1 tablet by mouth 3 (Three) Times a Day.       When do you need the refill by: ASAP    Does the patient have less than a 3 day supply:  [] Yes  [x] No    What is the patient's preferred pharmacy: [place] [phone] [place] [phone]

## 2021-06-04 DIAGNOSIS — M54.6 CHRONIC MIDLINE THORACIC BACK PAIN: ICD-10-CM

## 2021-06-04 DIAGNOSIS — G89.29 CHRONIC MIDLINE THORACIC BACK PAIN: ICD-10-CM

## 2021-06-11 ENCOUNTER — OFFICE VISIT (OUTPATIENT)
Dept: FAMILY MEDICINE CLINIC | Facility: CLINIC | Age: 51
End: 2021-06-11

## 2021-06-11 VITALS
OXYGEN SATURATION: 93 % | BODY MASS INDEX: 41.75 KG/M2 | HEART RATE: 77 BPM | HEIGHT: 73 IN | TEMPERATURE: 97.5 F | DIASTOLIC BLOOD PRESSURE: 70 MMHG | WEIGHT: 315 LBS | SYSTOLIC BLOOD PRESSURE: 118 MMHG

## 2021-06-11 DIAGNOSIS — M51.34 DDD (DEGENERATIVE DISC DISEASE), THORACIC: ICD-10-CM

## 2021-06-11 DIAGNOSIS — Z12.11 COLON CANCER SCREENING: Primary | ICD-10-CM

## 2021-06-11 DIAGNOSIS — I25.10 CHRONIC CORONARY ARTERY DISEASE: ICD-10-CM

## 2021-06-11 PROCEDURE — 99214 OFFICE O/P EST MOD 30 MIN: CPT | Performed by: FAMILY MEDICINE

## 2021-06-11 NOTE — PROGRESS NOTES
Subjective   Ang Yoon is a 51 y.o. male.   Chief Complaint   Patient presents with   • Back Pain       History of Present Illness   Patients presents today to go over his MRI results over his back pain.    Johnnie has had chronic low back pain and neck pain for about a year.  While doing physical therapy for his neck, a severely painful area just to the right of his mid thoracic spine was identified.  Further evaluation got put on hold.  He was followed at [facility] where they evaluated him with MRIs of his lumbar and cervical spine.  He had some type of interventional injection I believe in his back.  He was followed and managed by one of the nurse practitioners there, but never saw surgeon.  I referred him to Dr. Toussaint who evaluated him and felt that, while he certainly has significant findings on the MRI of his back, none of his symptoms could be reliably improved with surgery and he was felt not to be a candidate.  In discussing his thoracic pain with Dr. Toussaint, they both elected to let me order the MRI of his thoracic spine and follow-up with it.  To be clear Johnnie is certainly not looking for surgery.  He has been taking between 2-3 hydrocodone per day because of this pain.  I originally thought this would be temporary while he was worked up by spine surgery and something definitive was done or said.  Unfortunately that never happened through [facility].  At the last visit, we openly discussed that his weight is clearly a factor in his back pain.  He acknowledges this.  He was referred to the bariatric center and has already had a few video visits, filled out the new patient packet, and had contact with some of the staff there.    Johnnie was informed of the results of his thoracic MRI and today he wants to discuss a further plan.    Thoracic MRI 6/3/21-multilevel disc desiccation without hernia, canal stenosis, neuroforaminal narrowing. No acute osseous process. He does have midthoracic  right anterolateral spur formation with minimal endplate degenerative change. Marrow signal is normal. Spinal cord is normal.     My own thought is that there could possibly be some thoracic neuritis because of the bone spurs.    Johnnie is very depressed that he has all of these problems and really has not gotten any answers as to whether anything can actually be done to help minimize his pain.  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    His symptoms today remain unchanged.  He has low back pain chronically that will flareup at times and almost make him stumble.  He has pain and soreness and stiffness in his neck.  He also has an area of pain to palpation just to the right of the mid thoracic spine.  He has had no loss of bowel or bladder control.  At the last visit we tried a Lidoderm patch, but that did not help the pain.    He also requests a new cardiologist.  He has known coronary artery disease and has been using his nitroglycerin more recently.  He feels some pressure in his chest and thinks it might be radiating through from his back but he just does not know.  He reports that his back pain seems to ease up when he takes a nitroglycerin.    He also would like to get a Cologuard test.  He has been thinking about his health in general and this is something he believes he can do easily that will further those goals.      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   • Hypogonadism 06/13/2018   • Other problems related to lifestyle 04/24/2018   • Regular astigmatism 06/01/2017   • Presbyopia 06/01/2017   • Myopia 06/01/2017   • Chronic coronary artery disease 09/19/2012   • Cardiac disease 09/19/2012   • Cyst of meniscus of knee 06/22/2012   • Resting tremor 03/05/2012   • Degenerative joint disease 02/08/2012   • Hypertension 01/18/2012   • Anxiety disorder 01/18/2012   • Obsessive-compulsive disorder 01/18/2012   • Memory loss 01/16/2012           Past Surgical History:   Procedure Laterality Date   • CARDIAC CATHETERIZATION  04/2018   • KNEE ARTHROSCOPY Right 07/09/2012     Current Outpatient Medications on File Prior to Visit   Medication Sig   • albuterol sulfate  (90 Base) MCG/ACT inhaler INHALE 2 PUFFS BY MOUTH EVERY 4 HOURS AS NEEDED FOR WHEEZING   • EQ Aspirin Adult Low Dose 81 MG EC tablet Take 1 tablet by mouth once daily   • fluticasone (Flonase) 50 MCG/ACT nasal spray 2 sprays into the nostril(s) as directed by provider Daily.   • gabapentin (NEURONTIN) 300 MG capsule TAKE 1 CAPSULE BY MOUTH THREE TIMES DAILY   • HYDROcodone-acetaminophen (Norco)  MG per tablet Take 1 tablet by mouth 3 (Three) Times a Day.   • isosorbide mononitrate (IMDUR) 30 MG 24 hr tablet TAKE 1 TABLET BY MOUTH ONCE DAILY IN THE MORNING   • lidocaine (LIDODERM) 5 % Place 1 patch on the skin as directed by provider Daily. Remove & Discard patch within 12 hours or as directed by MD   • melatonin 5 MG tablet tablet Take 1 tablet by mouth At Night As Needed (insomnia).   • nitroglycerin (Nitrostat) 0.4 MG SL tablet Place 1 tablet under the tongue Every 5 (Five) Minutes As Needed for Chest Pain. Take no more than 3 doses in 15 minutes.   • propranolol (INDERAL) 80 MG tablet Take 1 tablet by mouth twice daily   • rosuvastatin (CRESTOR) 20 MG tablet TAKE 1 TABLET BY MOUTH ONCE DAILY AT BEDTIME   • traZODone  "(DESYREL) 50 MG tablet TAKE 1 TO 2 TABLETS BY MOUTH EVERY DAY AT BEDTIME AS NEEDED     No current facility-administered medications on file prior to visit.     Allergies   Allergen Reactions   • Niacin Rash     Social History     Socioeconomic History   • Marital status: Single     Spouse name: Not on file   • Number of children: Not on file   • Years of education: Not on file   • Highest education level: Not on file   Tobacco Use   • Smoking status: Former Smoker     Packs/day: 0.25     Types: Cigarettes   • Smokeless tobacco: Never Used   Vaping Use   • Vaping Use: Never used   Substance and Sexual Activity   • Alcohol use: No   • Drug use: No   • Sexual activity: Defer     Family History   Problem Relation Age of Onset   • Heart disease Other    • Hypertension Other    • Stroke Other    • Prostate cancer Other    • Hearing loss Mother    • Asthma Brother    • Asthma Daughter    • Arthritis Maternal Grandmother    • Arthritis Maternal Grandfather    • Diabetes Maternal Grandfather    • Stroke Maternal Grandfather        Review of Systems    Objective   /70 (BP Location: Left arm, Patient Position: Sitting, Cuff Size: Adult) Comment (BP Location): fore arm  Pulse 77   Temp 97.5 °F (36.4 °C) (Infrared)   Ht 185.4 cm (72.99\")   Wt (!) 169 kg (372 lb)   SpO2 93%   BMI 49.09 kg/m²   Physical Exam  Constitutional:       General: He is not in acute distress.     Appearance: He is well-developed. He is obese.      Comments: Wearing a face mask  Looks a bit uncomfortable sitting in the exam chair.  Sits forward to avoid putting any pressure on his upper back.  Has to change positions frequently.   HENT:      Head: Normocephalic and atraumatic.   Eyes:      Conjunctiva/sclera: Conjunctivae normal.   Cardiovascular:      Rate and Rhythm: Normal rate.   Pulmonary:      Effort: Pulmonary effort is normal.   Musculoskeletal:         General: Normal range of motion.      Cervical back: Normal range of motion. "   Skin:     General: Skin is warm and dry.      Findings: No rash.   Neurological:      Mental Status: He is alert and oriented to person, place, and time.   Psychiatric:         Behavior: Behavior normal.       Assessment/Plan   Diagnoses and all orders for this visit:    1. Colon cancer screening (Primary)  -     Cologuard - Stool, Per Rectum; Future    2. DDD (degenerative disc disease), thoracic  -     Ambulatory Referral to Pain Management    3. Chronic coronary artery disease  -     Ambulatory Referral to Cardiology    Immensely complicated situation.  He has been referred to appropriate specialists and his symptoms remain.  He has diagnoses, but no ongoing management plan  Fortunately no current warning signs.  He is on hydrocodone 3 times a day.  He nor I want him to be on that indefinitely, and when I started it, I did not think he would be on it indefinitely.  At this point, would like to make a referral to Dr. Baker in [place] or [place].  Primary question is regarding the thoracic pain.  Certainly we will appreciate an opinion regarding his lumbar and cervical problems as well, but Johnnie says it is the back pain that is holding him back.    We will place an order for Cologbryon.  He can do that at his convenience.    We will place a new referral to Dr. Cisneros.  I am not sure how this fits together, but I am concerned since Johnnie has a history of coronary artery disease and his back pain seems to improve with nitroglycerin.  I do not believe he is having constant angina around-the-clock, but I do believe he should be reevaluated because of his current complaint of retrosternal pressure relieved by nitroglycerin in the setting of a positive stress test in 2019.  I would like to follow-up with Johnnie again after some of these consults are done so we can regroup and see what the plan is.  I can then help him with ongoing coordination going forward, if needed.  If I am going to continue to prescribe his  hydrocodone, will need to get a routine schedule of visits planned.    Today, I spent over 30 minutes today in the room with Johnnie discussing all of the above and reviewing his chart beforehand.  During most of our office visit, the computer was off because I was giving Bill my attention during this complicated discussion.         Call with any problems or concerns before next visit  No follow-ups on file.      Much of this report is an electronic transcription of spoken language to printed text using Dragon dictation software.  As such, the subtleties and finesse of spoken language may permit erroneous, or at times, nonsensical words or phrases to be inadvertently transcribed; thus changes may be made at a later date to rectify these errors.

## 2021-07-08 DIAGNOSIS — M51.36 LUMBAR DEGENERATIVE DISC DISEASE: ICD-10-CM

## 2021-07-08 RX ORDER — HYDROCODONE BITARTRATE AND ACETAMINOPHEN 10; 325 MG/1; MG/1
1 TABLET ORAL 3 TIMES DAILY
Qty: 90 TABLET | Refills: 0 | Status: SHIPPED | OUTPATIENT
Start: 2021-07-08 | End: 2021-08-18 | Stop reason: SDUPTHER

## 2021-07-08 NOTE — TELEPHONE ENCOUNTER
Caller: Ang Yoon    Relationship: Self    Best call back number: [phone] (H)    Medication needed:   HYDROcodone-acetaminophen (Norco)  MG per tablet    When do you need the refill by: 07/12/21    What additional details did the patient provide when requesting the medication: PATIENT CALLED TO REQUEST A MEDICATION REFILL ON HIS MEDICATION. PATIENT STATES THAT HE HAS A 3 DAY SUPPLY LEFT.       Does the patient have less than a 3 day supply:  [] Yes  [x] No    What is the patient's preferred pharmacy:    [address] [place] [phone] [place] [phone] FX    THANKS

## 2021-07-12 ENCOUNTER — OFFICE VISIT (OUTPATIENT)
Dept: CARDIOLOGY | Facility: CLINIC | Age: 51
End: 2021-07-12

## 2021-07-12 VITALS
HEART RATE: 61 BPM | OXYGEN SATURATION: 94 % | BODY MASS INDEX: 41.75 KG/M2 | WEIGHT: 315 LBS | HEIGHT: 73 IN | DIASTOLIC BLOOD PRESSURE: 80 MMHG | SYSTOLIC BLOOD PRESSURE: 131 MMHG

## 2021-07-12 DIAGNOSIS — I25.10 CHRONIC CORONARY ARTERY DISEASE: ICD-10-CM

## 2021-07-12 DIAGNOSIS — Q24.5 CORONARY-MYOCARDIAL BRIDGE: ICD-10-CM

## 2021-07-12 DIAGNOSIS — G47.33 OSA (OBSTRUCTIVE SLEEP APNEA): Primary | ICD-10-CM

## 2021-07-12 DIAGNOSIS — R06.02 SHORTNESS OF BREATH: ICD-10-CM

## 2021-07-12 PROCEDURE — 99204 OFFICE O/P NEW MOD 45 MIN: CPT | Performed by: INTERNAL MEDICINE

## 2021-07-12 RX ORDER — NAPROXEN SODIUM 220 MG
220 TABLET ORAL 2 TIMES DAILY PRN
COMMUNITY

## 2021-07-12 NOTE — PROGRESS NOTES
Date of Office Visit: 2021  Encounter Provider: Dr. Harjinder Cisneros    Place of Service: [facility] CARDIOLOGY [place]  Patient Name: Ang Yoon  :1970  Traci Bass MD    Chief Complaint   Patient presents with   • Consult   • Coronary Artery Disease   • Hypertension     History of Present Illness:    I am pleased to see Mr. Martinez in my office today as a new consultation.    As you know, patient is 51 years old white gentleman whose past medical history significant for hypertension, asthma, history of CAD, who is referred to me for second opinion for his symptom of shortness of breath and atypical chest pain.    In 2021 patient developed COVID-19 infection and since then he had shortness of breath which is slowly getting better.  Patient also complained of chest pain.  He had previous chest pain.  In 2019, patient underwent stress test which showed inferior lateral wall ischemia and subsequent cardiac catheterization showed nonobstructive CAD.  I do not have the record of cardiac catheterization with me at present.    Patient now is complaining of chest pain.  Patient described this chest pain as a sharp on the left side of the chest with radiation to the left shoulder.  Patient is very limited in his activity because of his severe backache.  Patient has mild shortness of breath but he believes shortness of breath is improving.  He cannot do much activity and is limited in his activity because of backache.  Patient denies any orthopnea, PND, syncope or presyncope.  Patient has trace leg edema.    EKG showed sinus bradycardia with heart rate of 59 bpm.  Nonspecific ST changes noted.    Patient does not have previous history of myocardial infarction or PCI.  He is noted to have nonobstructive disease.  Patient does not smoke or abuse alcohol.    At this stage I will recommend that patient should proceed with stress test.  If it is negative I would strongly recommend the  patient patient should consider weight watcher and possible bariatric surgery to lose weight.    Patient also have previous history of sleep apnea but he could not tolerate the CPAP and he has given up the CPAP and not using it.  Patient is advised to call Dr. Kunz and have a repeat sleep studies and probably new CPAP/BiPAP to help for obstructive sleep apnea        Past Medical History:   Diagnosis Date   • Arthritis    • CAD (coronary artery disease)    • Coronary-myocardial bridge    • Dyspnea on exertion 07/16/2019   • Hyperlipidemia    • Hypertension    • Obesity    • Sleep apnea, obstructive          Past Surgical History:   Procedure Laterality Date   • CARDIAC CATHETERIZATION  04/2018   • KNEE ARTHROSCOPY Right 07/09/2012           Current Outpatient Medications:   •  albuterol sulfate  (90 Base) MCG/ACT inhaler, INHALE 2 PUFFS BY MOUTH EVERY 4 HOURS AS NEEDED FOR WHEEZING, Disp: 9 g, Rfl: 0  •  EQ Aspirin Adult Low Dose 81 MG EC tablet, Take 1 tablet by mouth once daily, Disp: 30 tablet, Rfl: 3  •  fluticasone (Flonase) 50 MCG/ACT nasal spray, 2 sprays into the nostril(s) as directed by provider Daily., Disp: 1 bottle, Rfl: 2  •  gabapentin (NEURONTIN) 300 MG capsule, TAKE 1 CAPSULE BY MOUTH THREE TIMES DAILY, Disp: 90 capsule, Rfl: 5  •  HYDROcodone-acetaminophen (Norco)  MG per tablet, Take 1 tablet by mouth 3 (Three) Times a Day., Disp: 90 tablet, Rfl: 0  •  isosorbide mononitrate (IMDUR) 30 MG 24 hr tablet, TAKE 1 TABLET BY MOUTH ONCE DAILY IN THE MORNING, Disp: 90 tablet, Rfl: 3  •  lidocaine (LIDODERM) 5 %, Place 1 patch on the skin as directed by provider Daily. Remove & Discard patch within 12 hours or as directed by MD, Disp: 30 patch, Rfl: 1  •  melatonin 5 MG tablet tablet, Take 1 tablet by mouth At Night As Needed (insomnia)., Disp: 90 tablet, Rfl: 2  •  naproxen sodium (ALEVE) 220 MG tablet, Take 220 mg by mouth 2 (Two) Times a Day As Needed., Disp: , Rfl:   •  nitroglycerin  (Nitrostat) 0.4 MG SL tablet, Place 1 tablet under the tongue Every 5 (Five) Minutes As Needed for Chest Pain. Take no more than 3 doses in 15 minutes., Disp: 25 tablet, Rfl: 2  •  propranolol (INDERAL) 80 MG tablet, Take 1 tablet by mouth twice daily, Disp: 60 tablet, Rfl: 5  •  rosuvastatin (CRESTOR) 20 MG tablet, TAKE 1 TABLET BY MOUTH ONCE DAILY AT BEDTIME, Disp: 90 tablet, Rfl: 3  •  traZODone (DESYREL) 50 MG tablet, TAKE 1 TO 2 TABLETS BY MOUTH EVERY DAY AT BEDTIME AS NEEDED, Disp: 60 tablet, Rfl: 3      Social History     Socioeconomic History   • Marital status: Single     Spouse name: Not on file   • Number of children: Not on file   • Years of education: Not on file   • Highest education level: Not on file   Tobacco Use   • Smoking status: Former Smoker     Packs/day: 0.25     Years: 3.00     Pack years: 0.75     Types: Cigarettes     Start date: 1988     Quit date: 1991     Years since quittin.5   • Smokeless tobacco: Never Used   • Tobacco comment: social off and on, until  (meaning one or two cigarettes' a week.)   Vaping Use   • Vaping Use: Never used   Substance and Sexual Activity   • Alcohol use: No   • Drug use: No   • Sexual activity: Defer         Review of Systems   Constitutional: Negative for chills and fever.   HENT: Negative for ear discharge and nosebleeds.    Eyes: Negative for discharge and redness.   Cardiovascular: Negative for chest pain, orthopnea, palpitations, paroxysmal nocturnal dyspnea and syncope.   Respiratory: Positive for shortness of breath. Negative for cough and wheezing.    Endocrine: Negative for heat intolerance.   Skin: Negative for rash.   Musculoskeletal: Positive for arthritis, back pain and joint pain. Negative for myalgias.   Gastrointestinal: Negative for abdominal pain, melena, nausea and vomiting.   Genitourinary: Negative for dysuria and hematuria.   Neurological: Negative for dizziness, light-headedness, numbness and tremors.  "  Psychiatric/Behavioral: Negative for depression. The patient is not nervous/anxious.        Procedures    Procedures    No orders to display           Objective:    /80   Pulse 61   Ht 185.4 cm (72.99\")   Wt (!) 170 kg (375 lb)   SpO2 94%   BMI 49.49 kg/m²         Constitutional:       Appearance: Well-developed.   Eyes:      General: No scleral icterus.        Right eye: No discharge.   HENT:      Head: Normocephalic and atraumatic.   Neck:      Thyroid: No thyromegaly.      Lymphadenopathy: No cervical adenopathy.   Pulmonary:      Effort: Pulmonary effort is normal. No respiratory distress.      Breath sounds: Normal breath sounds. No wheezing. No rales.   Cardiovascular:      Normal rate. Regular rhythm.      No gallop.   Abdominal:      Tenderness: There is no abdominal tenderness.   Skin:     Findings: No erythema or rash.   Neurological:      Mental Status: Alert and oriented to person, place, and time.             Assessment:       Diagnosis Plan   1. GUILLERMINA (obstructive sleep apnea)     2. Chronic coronary artery disease     3. Coronary-myocardial bridge     4. Shortness of breath              Plan:       MDM:    1.  Chest pain:    I would recommend to proceed with stress test.    2.  CAD:    Patient has nonobstructive disease with possible myocardial bridging.    3.  Obstructive sleep apnea:    I recommend to proceed with sleep specialist Dr. Kunz    4.  Hypertension:    Patient is on propranolol  "

## 2021-07-13 RX ORDER — ISOSORBIDE MONONITRATE 30 MG/1
TABLET, EXTENDED RELEASE ORAL
Qty: 30 TABLET | Refills: 2 | Status: SHIPPED | OUTPATIENT
Start: 2021-07-13 | End: 2021-10-21

## 2021-07-15 ENCOUNTER — TELEPHONE (OUTPATIENT)
Dept: PAIN MEDICINE | Facility: HOSPITAL | Age: 51
End: 2021-07-15

## 2021-07-15 ENCOUNTER — OFFICE VISIT (OUTPATIENT)
Dept: PAIN MEDICINE | Facility: CLINIC | Age: 51
End: 2021-07-15

## 2021-07-15 VITALS
WEIGHT: 315 LBS | DIASTOLIC BLOOD PRESSURE: 74 MMHG | TEMPERATURE: 96.9 F | HEIGHT: 73 IN | RESPIRATION RATE: 16 BRPM | HEART RATE: 70 BPM | SYSTOLIC BLOOD PRESSURE: 107 MMHG | BODY MASS INDEX: 41.75 KG/M2 | OXYGEN SATURATION: 94 %

## 2021-07-15 DIAGNOSIS — M51.9 FORAMINAL STENOSIS DUE TO INTERVERTEBRAL DISC DISEASE: ICD-10-CM

## 2021-07-15 DIAGNOSIS — M99.79 FORAMINAL STENOSIS DUE TO INTERVERTEBRAL DISC DISEASE: ICD-10-CM

## 2021-07-15 DIAGNOSIS — M47.814 THORACIC SPONDYLOSIS: Primary | ICD-10-CM

## 2021-07-15 PROCEDURE — G0463 HOSPITAL OUTPT CLINIC VISIT: HCPCS | Performed by: STUDENT IN AN ORGANIZED HEALTH CARE EDUCATION/TRAINING PROGRAM

## 2021-07-15 PROCEDURE — 99204 OFFICE O/P NEW MOD 45 MIN: CPT | Performed by: STUDENT IN AN ORGANIZED HEALTH CARE EDUCATION/TRAINING PROGRAM

## 2021-07-15 RX ORDER — DULOXETIN HYDROCHLORIDE 30 MG/1
30 CAPSULE, DELAYED RELEASE ORAL DAILY
Qty: 30 CAPSULE | Refills: 2 | Status: SHIPPED | OUTPATIENT
Start: 2021-07-15 | End: 2021-09-02 | Stop reason: SDUPTHER

## 2021-07-15 NOTE — TELEPHONE ENCOUNTER
PATIENT IS SCHEDULED FOR A RIGHT THORACIC MEDIAL BRANCH BLOCK ON 7/29/2021, PLEASE CHECK INSURANCE . THANK YOU

## 2021-07-15 NOTE — PROGRESS NOTES
CHIEF COMPLAINT  Chief Complaint   Patient presents with   • Back Pain     GOING INTO LEGS. NORCO LAST DOSE 7/14/21 2030   • Neck Pain       Primary Care  Traci Bass MD    Subjective   Ang Yoon is a 51 y.o. male  who presents for chronic mid back pain with associated right lower extremity neurogenic medication.  He states that the pain began approximately 2 years ago when he was lifting a piece of heavy machinery.  He states that since that time, he has had continued mid back pain which radiates into the right scapula.  The pain is worse with prolonged standing and improved with sitting and rest.  The pain makes it difficult for him to perform his daily activities.  He describes the pain as a sharp, stabbing pain in the mid back into his right scapula.  He also has some neurogenic claudication with numb and tingly right lower extremity especially walking which resolves with rest.  He has been evaluated by the neurosurgeon as well as primary care who did not see any surgical lesions    History of Present Illness     Location: Mid back with radiation into the right shoulder, low back with right lower extremity neurogenic claudication  Onset: 2 years  Duration: Progressively worsening  Timing: Constant throughout the day  Quality: Sharp, stabbing with numbness and tingling of the right lower extremity  Severity: Today: 3       Last Week: 3       Worst: 8  Modifying Factors: The pain is worse with movement physical activity and standing and improves with sitting and resting    Physical Therapy: yes    Interval Update 07/15/2021:     The following portions of the patient's history were reviewed and updated as appropriate: allergies, current medications, past family history, past medical history, past social history, past surgical history and problem list.      Current Outpatient Medications:   •  albuterol sulfate  (90 Base) MCG/ACT inhaler, INHALE 2 PUFFS BY MOUTH EVERY 4 HOURS AS NEEDED  FOR WHEEZING, Disp: 9 g, Rfl: 0  •  EQ Aspirin Adult Low Dose 81 MG EC tablet, Take 1 tablet by mouth once daily, Disp: 30 tablet, Rfl: 3  •  fluticasone (Flonase) 50 MCG/ACT nasal spray, 2 sprays into the nostril(s) as directed by provider Daily., Disp: 1 bottle, Rfl: 2  •  gabapentin (NEURONTIN) 300 MG capsule, TAKE 1 CAPSULE BY MOUTH THREE TIMES DAILY, Disp: 90 capsule, Rfl: 5  •  HYDROcodone-acetaminophen (Norco)  MG per tablet, Take 1 tablet by mouth 3 (Three) Times a Day., Disp: 90 tablet, Rfl: 0  •  isosorbide mononitrate (IMDUR) 30 MG 24 hr tablet, TAKE 1 TABLET BY MOUTH ONCE DAILY IN THE MORNING, Disp: 30 tablet, Rfl: 2  •  lidocaine (LIDODERM) 5 %, Place 1 patch on the skin as directed by provider Daily. Remove & Discard patch within 12 hours or as directed by MD, Disp: 30 patch, Rfl: 1  •  melatonin 5 MG tablet tablet, Take 1 tablet by mouth At Night As Needed (insomnia)., Disp: 90 tablet, Rfl: 2  •  naproxen sodium (ALEVE) 220 MG tablet, Take 220 mg by mouth 2 (Two) Times a Day As Needed., Disp: , Rfl:   •  nitroglycerin (Nitrostat) 0.4 MG SL tablet, Place 1 tablet under the tongue Every 5 (Five) Minutes As Needed for Chest Pain. Take no more than 3 doses in 15 minutes., Disp: 25 tablet, Rfl: 2  •  propranolol (INDERAL) 80 MG tablet, Take 1 tablet by mouth twice daily, Disp: 60 tablet, Rfl: 5  •  rosuvastatin (CRESTOR) 20 MG tablet, TAKE 1 TABLET BY MOUTH ONCE DAILY AT BEDTIME, Disp: 90 tablet, Rfl: 3  •  traZODone (DESYREL) 50 MG tablet, TAKE 1 TO 2 TABLETS BY MOUTH EVERY DAY AT BEDTIME AS NEEDED, Disp: 60 tablet, Rfl: 3  •  DULoxetine (CYMBALTA) 30 MG capsule, Take 1 capsule by mouth Daily., Disp: 30 capsule, Rfl: 2    Review of Systems   Musculoskeletal: Positive for arthralgias, back pain, gait problem and neck pain.   Neurological: Positive for weakness and numbness.       Vitals:    07/15/21 0802   BP: 107/74   Pulse: 70   Resp: 16   Temp: 96.9 °F (36.1 °C)   SpO2: 94%   Weight: (!) 168 kg  "(370 lb)   Height: 185.4 cm (73\")   PainSc:   3       Objective   Physical Exam  Vitals and nursing note reviewed.   Constitutional:       General: He is not in acute distress.     Appearance: Normal appearance. He is obese.   Musculoskeletal:      Comments: Thoracic spine exam:  1.  Tender to palpation thoracic paraspinals with positive facet loading especially on the right.  He has palpable muscle spasms with associated facet loading.    Lumbar spine exam:  1.  Mildly tender to palpation lumbar paraspinals  2.  Lumbar facet loading weakly positive bilaterally  3.  Lumbar facets minimally tender to palpation  4.  Straight leg raise equivocal on the right   Neurological:      Mental Status: He is alert.      Sensory: No sensory deficit.      Motor: No weakness.           Assessment/Plan   Problems Addressed this Visit     None      Visit Diagnoses     Thoracic spondylosis    -  Primary    Foraminal stenosis due to intervertebral disc disease          Diagnoses       Codes Comments    Thoracic spondylosis    -  Primary ICD-10-CM: M47.814  ICD-9-CM: 721.2     Foraminal stenosis due to intervertebral disc disease     ICD-10-CM: M99.79, M51.9  ICD-9-CM: 724.00, 722.90           Plan:  1. So far, he has had a fairly extensive work-up for his pain without any obvious sources.  His overall story is somewhat unusual and that all this began after lifting incident however he does not have any radiographic findings of acute injury.  I agree with his primary care that this may be a component of medial branch neuritis following the lifting incident.  His MRI does reveal some degenerative disc disease and associated spurring at multiple levels which could be causing the pain.  2. We will plan for right-sided three-level thoracic medial branch block of the affected joints with hopes of decreasing the medial branch inflammation.  3. As far as his low back pain goes, he is experiencing component of neurogenic claudication likely " from significant foraminal stenosis especially on the right as evidenced by his lumbar MRI  4. We will plan for lumbar epidural steroid injection and possible L5 transforaminal epidural in the future after get the mid back resolved  5. I will also start him on duloxetine 30 mg daily.  This will help both neuropathic pain as well as it will hopefully improve his mood.  He has a significant burden of depression given his inability to work  --- Follow-up next available for thoracic three-level medial branch block           INSPECT REPORT    As part of the patient's treatment plan, I may be prescribing controlled substances. The patient has been made aware of appropriate use of such medications, including potential risk of somnolence, limited ability to drive and/or work safely, and the potential for dependence or overdose. It has also bee made clear that these medications are for use by this patient only, without concomitant use of alcohol or other substances unless prescribed.     Patient has completed prescribing agreement detailing terms of continued prescribing of controlled substances, including monitoring JOSE reports, urine drug screening, and pill counts if necessary. The patient is aware that inappropriate use will results in cessation of prescribing such medications.    INSPECT report has been reviewed and scanned into the patient's chart.    As the clinician, I personally reviewed the INSPECT from 7/14/2021.    History and physical exam exhibit continued safe and appropriate use of controlled substances.      EMR Dragon/Transcription disclaimer:   Much of this encounter note is an electronic transcription/translation of spoken language to printed text. The electronic translation of spoken language may permit erroneous, or at times, nonsensical words or phrases to be inadvertently transcribed; Although I have reviewed the note for such errors, some may still exist.

## 2021-07-19 ENCOUNTER — TELEPHONE (OUTPATIENT)
Dept: FAMILY MEDICINE CLINIC | Facility: CLINIC | Age: 51
End: 2021-07-19

## 2021-07-19 NOTE — TELEPHONE ENCOUNTER
Please tell Bill that the best vaccine for him would be either the Pfizer or Materna 2 dose series.  It appears that the Pfizer has slightly better protective data, so if he has a choice, that is the one I would recommend.  Thanks!

## 2021-07-19 NOTE — TELEPHONE ENCOUNTER
Caller: Ang Yoon    Relationship: Self    Best call back number: [phone]     What is the best time to reach you: ANY TIME    Who are you requesting to speak with (clinical staff, provider,  specific staff member): CLINICAL - DOCTOR CHELSEA      What was the call regarding: PATIENT WANTED TO DISCUSS WITH DOCTOR TRAN WHICH COVID VACCINE HE SHOULD RECEIVE DUE TO HIS HEALTH ISSUES.     PLEASE CALL AND ADVISE PATIENT.       Do you require a callback: YES

## 2021-07-26 ENCOUNTER — OUTSIDE FACILITY SERVICE (OUTPATIENT)
Dept: CARDIOLOGY | Facility: CLINIC | Age: 51
End: 2021-07-26

## 2021-07-26 PROCEDURE — OUTSIDEPOS PR OUTSIDE POS PLACEHOLDER: Performed by: INTERNAL MEDICINE

## 2021-07-29 ENCOUNTER — HOSPITAL ENCOUNTER (OUTPATIENT)
Dept: PAIN MEDICINE | Facility: HOSPITAL | Age: 51
Discharge: HOME | End: 2021-07-29

## 2021-07-29 VITALS
SYSTOLIC BLOOD PRESSURE: 124 MMHG | RESPIRATION RATE: 16 BRPM | HEART RATE: 61 BPM | OXYGEN SATURATION: 98 % | BODY MASS INDEX: 41.75 KG/M2 | DIASTOLIC BLOOD PRESSURE: 81 MMHG | TEMPERATURE: 96.8 F | WEIGHT: 315 LBS | HEIGHT: 73 IN

## 2021-07-29 DIAGNOSIS — M99.79 FORAMINAL STENOSIS DUE TO INTERVERTEBRAL DISC DISEASE: ICD-10-CM

## 2021-07-29 DIAGNOSIS — R52 PAIN: ICD-10-CM

## 2021-07-29 DIAGNOSIS — M47.814 THORACIC SPONDYLOSIS: Primary | ICD-10-CM

## 2021-07-29 DIAGNOSIS — M51.9 FORAMINAL STENOSIS DUE TO INTERVERTEBRAL DISC DISEASE: ICD-10-CM

## 2021-07-29 PROCEDURE — 64492 INJ PARAVERT F JNT C/T 3 LEV: CPT | Performed by: STUDENT IN AN ORGANIZED HEALTH CARE EDUCATION/TRAINING PROGRAM

## 2021-07-29 PROCEDURE — 25010000002 METHYLPREDNISOLONE PER 40 MG: Performed by: STUDENT IN AN ORGANIZED HEALTH CARE EDUCATION/TRAINING PROGRAM

## 2021-07-29 PROCEDURE — 77003 FLUOROGUIDE FOR SPINE INJECT: CPT

## 2021-07-29 PROCEDURE — 64490 INJ PARAVERT F JNT C/T 1 LEV: CPT | Performed by: STUDENT IN AN ORGANIZED HEALTH CARE EDUCATION/TRAINING PROGRAM

## 2021-07-29 PROCEDURE — 0 IOPAMIDOL 41 % SOLUTION: Performed by: STUDENT IN AN ORGANIZED HEALTH CARE EDUCATION/TRAINING PROGRAM

## 2021-07-29 PROCEDURE — 64491 INJ PARAVERT F JNT C/T 2 LEV: CPT | Performed by: STUDENT IN AN ORGANIZED HEALTH CARE EDUCATION/TRAINING PROGRAM

## 2021-07-29 RX ORDER — METHYLPREDNISOLONE ACETATE 40 MG/ML
40 INJECTION, SUSPENSION INTRA-ARTICULAR; INTRALESIONAL; INTRAMUSCULAR; SOFT TISSUE ONCE
Status: COMPLETED | OUTPATIENT
Start: 2021-07-29 | End: 2021-07-29

## 2021-07-29 RX ORDER — BUPIVACAINE HYDROCHLORIDE 5 MG/ML
10 INJECTION, SOLUTION EPIDURAL; INTRACAUDAL ONCE
Status: COMPLETED | OUTPATIENT
Start: 2021-07-29 | End: 2021-07-29

## 2021-07-29 RX ADMIN — BUPIVACAINE HYDROCHLORIDE 10 ML: 5 INJECTION, SOLUTION EPIDURAL; INTRACAUDAL; PERINEURAL at 13:51

## 2021-07-29 RX ADMIN — IOPAMIDOL 3 ML: 408 INJECTION, SOLUTION INTRATHECAL at 13:52

## 2021-07-29 RX ADMIN — METHYLPREDNISOLONE ACETATE 40 MG: 40 INJECTION, SUSPENSION INTRA-ARTICULAR; INTRALESIONAL; INTRAMUSCULAR; SOFT TISSUE at 13:52

## 2021-07-29 NOTE — PROCEDURES
Thoracic Medial Branch Blockades:  [facility]      PREOPERATIVE DIAGNOSIS:  Thoracic spondylosis without myelopathy    POSTOPERATIVE DIAGNOSIS:  Same as above.  Medial branch neuritis.     PROCEDURE:   Diagnostic Thoracic Medial Branch Nerve Blockades, with fluoroscopy:   - LEVELS:  right  T5, T6, T7 and T8    PRE-PROCEDURE DISCUSSION WITH PATIENT:    Risks and complications were discussed with the patient prior to starting the procedure and informed consent was obtained.   We discussed various topics including but not limited to bleeding, infection, injury, nerve injury, paralysis, coma, death, pneumothorax, worsening of clinical picture, postprocedural soreness, and lack of pain relief.  We discussed the diagnostic nature of this procedure as well as the potential for possible long term therapeutic relief, and the odds of such.    SURGEON:  Nahid Baker MD    REASON FOR PROCEDURE:    Thoracic Medial Branch Neuritis    SEDATION:  Patient declined administration of moderate sedation    ANESTHETIC:  Marcaine 0.25%  STEROID:  Methylprednisolone (DEPO MEDROL) 40mg/ml  TOTAL VOLUME OF SOLUTION:  4    DESCRIPTON OF PROCEDURE:  After obtaining informed consent, IV access was not  obtained in the preoperative area.   The patient was taken to the operating room.  The patient was placed in the prone position with a pillow under the abdomen. All pressure points were well padded.   The thoracic area was prepped with Chloraprep and draped in a sterile fashion.     Under fluoroscopic guidance at each of the levels to address, as above, the transverse processes those vertebrae at the junctions of the superior articular processes were identified.  Skin and subcutaneous tissue were anesthetized with 1% lidocaine above each of these points.     A spinal needle was introduced under fluoroscopic guidance toward each of the above junctions, advancing under serial fluoroscopic images to ensure that the needle tip was  overlying the transverse process throughout the course of each needle.  Then a lateral image was taken for each needle position to ensure proper depth and that the needles were near the joint.  Aspiration was negative for blood and CSF.  After confirming the position of the needle with fluoroscope in all views, 0.25 mL of Omnipaque was injected, and after seeing the proper spread a total of 1 mL of the anesthetic solution noted above was injected at each of these points.  Needles were removed intact from each of the areas.  Onset of analgesia was noted.  Vital signs remained stable throughout.        ESTIMATED BLOOD LOSS:  <5 mL  SPECIMENS:  none    COMPLICATIONS:   No complications were noted., There was no indication of vascular uptake on live injection of contrast dye. and There was no indication of intrathecal uptake on live injection of contrast dye.    TOLERANCE & DISCHARGE CONDITION:    The patient tolerated the procedure well.  The patient was transported to the recovery area without difficulties.  The patient was discharged to home under the care of family in stable and satisfactory condition.    PLAN OF CARE:  1. The patient was given our standard instruction sheet.  2. We discussed that Medial Branch Blockade is a diagnostic procedure in consideration for radiofrequency ablation if two diagnostic procedures prove to be positive for significant benefit.  If sustained relief of 6 to eight weeks is obtained, then an alternative plan could be therapeutic lumbar branch blockades.  3. The patient is asked to keep a pain log each hour for 8 hours after the procedure today.  4. The patient will  Return to clinic 2-3 wks  5. The patient will resume all medications as per the medication reconciliation sheet.

## 2021-08-10 DIAGNOSIS — E78.2 MIXED HYPERLIPIDEMIA: ICD-10-CM

## 2021-08-11 RX ORDER — ROSUVASTATIN CALCIUM 20 MG/1
TABLET, COATED ORAL
Qty: 30 TABLET | Refills: 0 | Status: SHIPPED | OUTPATIENT
Start: 2021-08-11 | End: 2021-10-04

## 2021-08-18 DIAGNOSIS — M51.36 LUMBAR DEGENERATIVE DISC DISEASE: ICD-10-CM

## 2021-08-18 RX ORDER — HYDROCODONE BITARTRATE AND ACETAMINOPHEN 10; 325 MG/1; MG/1
1 TABLET ORAL 3 TIMES DAILY
Qty: 90 TABLET | Refills: 0 | Status: SHIPPED | OUTPATIENT
Start: 2021-08-18 | End: 2021-10-06 | Stop reason: SDUPTHER

## 2021-08-18 NOTE — TELEPHONE ENCOUNTER
Caller: Ang Yoon    Relationship: Self    Best call back number:     Medication needed:   Requested Prescriptions     Pending Prescriptions Disp Refills   • HYDROcodone-acetaminophen (Norco)  MG per tablet 90 tablet 0     Sig: Take 1 tablet by mouth 3 (Three) Times a Day.       When do you need the refill by: WITHIN THE NEXT WEEK    What additional details did the patient provide when requesting the medication: PATIENT HAS 6-7 PILLS LEFT    Does the patient have less than a 3 day supply:  [] Yes  [x] No    What is the patient's preferred pharmacy: [address] - [address] [place] [phone] [place] [phone]

## 2021-09-02 ENCOUNTER — OFFICE VISIT (OUTPATIENT)
Dept: PAIN MEDICINE | Facility: CLINIC | Age: 51
End: 2021-09-02

## 2021-09-02 VITALS
WEIGHT: 315 LBS | SYSTOLIC BLOOD PRESSURE: 157 MMHG | TEMPERATURE: 96.8 F | DIASTOLIC BLOOD PRESSURE: 100 MMHG | OXYGEN SATURATION: 94 % | RESPIRATION RATE: 16 BRPM | HEART RATE: 67 BPM | HEIGHT: 73 IN | BODY MASS INDEX: 41.75 KG/M2

## 2021-09-02 DIAGNOSIS — G89.29 CHRONIC MIDLINE LOW BACK PAIN WITH BILATERAL SCIATICA: ICD-10-CM

## 2021-09-02 DIAGNOSIS — M54.42 CHRONIC MIDLINE LOW BACK PAIN WITH BILATERAL SCIATICA: ICD-10-CM

## 2021-09-02 DIAGNOSIS — M54.41 CHRONIC MIDLINE LOW BACK PAIN WITH BILATERAL SCIATICA: ICD-10-CM

## 2021-09-02 PROCEDURE — 99214 OFFICE O/P EST MOD 30 MIN: CPT | Performed by: STUDENT IN AN ORGANIZED HEALTH CARE EDUCATION/TRAINING PROGRAM

## 2021-09-02 PROCEDURE — G0463 HOSPITAL OUTPT CLINIC VISIT: HCPCS | Performed by: STUDENT IN AN ORGANIZED HEALTH CARE EDUCATION/TRAINING PROGRAM

## 2021-09-02 RX ORDER — DULOXETIN HYDROCHLORIDE 30 MG/1
30 CAPSULE, DELAYED RELEASE ORAL DAILY
Qty: 30 CAPSULE | Refills: 2 | Status: SHIPPED | OUTPATIENT
Start: 2021-09-02 | End: 2021-10-14 | Stop reason: SDUPTHER

## 2021-09-02 NOTE — PROGRESS NOTES
CHIEF COMPLAINT  Chief Complaint   Patient presents with   • Back Pain     mid/low norco  last dose 9-1-21 2000   • Neck Pain       Primary Care  Tarci Bass MD    Subjective   Ang Yoon is a 51 y.o. male  who presents for chronic mid back pain with associated right lower extremity neurogenic medication.  He states that the pain began approximately 2 years ago when he was lifting a piece of heavy machinery.  He states that since that time, he has had continued mid back pain which radiates into the right scapula.  The pain is worse with prolonged standing and improved with sitting and rest.  The pain makes it difficult for him to perform his daily activities.  He describes the pain as a sharp, stabbing pain in the mid back into his right scapula.  He also has some neurogenic claudication with numb and tingly right lower extremity especially walking which resolves with rest.  He has been evaluated by the neurosurgeon as well as primary care who did not see any surgical lesions    Back Pain  Associated symptoms include numbness and weakness.   Neck Pain   Associated symptoms include numbness and weakness.        Location: Mid back with radiation into the right shoulder, low back with right lower extremity neurogenic claudication  Onset: 2 years  Duration: Progressively worsening  Timing: Constant throughout the day  Quality: Sharp, stabbing with numbness and tingling of the right lower extremity  Severity: Today: 3       Last Week: 3       Worst: 8  Modifying Factors: The pain is worse with movement physical activity and standing and improves with sitting and resting    Physical Therapy: yes    Interval Update 09/02/2021:     The following portions of the patient's history were reviewed and updated as appropriate: allergies, current medications, past family history, past medical history, past social history, past surgical history and problem list.      Current Outpatient Medications:   •   albuterol sulfate  (90 Base) MCG/ACT inhaler, INHALE 2 PUFFS BY MOUTH EVERY 4 HOURS AS NEEDED FOR WHEEZING, Disp: 9 g, Rfl: 0  •  DULoxetine (CYMBALTA) 30 MG capsule, Take 1 capsule by mouth Daily., Disp: 30 capsule, Rfl: 2  •  EQ Aspirin Adult Low Dose 81 MG EC tablet, Take 1 tablet by mouth once daily, Disp: 30 tablet, Rfl: 3  •  fluticasone (Flonase) 50 MCG/ACT nasal spray, 2 sprays into the nostril(s) as directed by provider Daily., Disp: 1 bottle, Rfl: 2  •  gabapentin (NEURONTIN) 300 MG capsule, TAKE 1 CAPSULE BY MOUTH THREE TIMES DAILY, Disp: 90 capsule, Rfl: 5  •  HYDROcodone-acetaminophen (Norco)  MG per tablet, Take 1 tablet by mouth 3 (Three) Times a Day., Disp: 90 tablet, Rfl: 0  •  isosorbide mononitrate (IMDUR) 30 MG 24 hr tablet, TAKE 1 TABLET BY MOUTH ONCE DAILY IN THE MORNING, Disp: 30 tablet, Rfl: 2  •  lidocaine (LIDODERM) 5 %, Place 1 patch on the skin as directed by provider Daily. Remove & Discard patch within 12 hours or as directed by MD, Disp: 30 patch, Rfl: 1  •  melatonin 5 MG tablet tablet, Take 1 tablet by mouth At Night As Needed (insomnia)., Disp: 90 tablet, Rfl: 2  •  naproxen sodium (ALEVE) 220 MG tablet, Take 220 mg by mouth 2 (Two) Times a Day As Needed., Disp: , Rfl:   •  nitroglycerin (Nitrostat) 0.4 MG SL tablet, Place 1 tablet under the tongue Every 5 (Five) Minutes As Needed for Chest Pain. Take no more than 3 doses in 15 minutes., Disp: 25 tablet, Rfl: 2  •  propranolol (INDERAL) 80 MG tablet, Take 1 tablet by mouth twice daily, Disp: 60 tablet, Rfl: 5  •  rosuvastatin (CRESTOR) 20 MG tablet, TAKE 1 TABLET BY MOUTH ONCE DAILY AT BEDTIME, Disp: 30 tablet, Rfl: 0  •  traZODone (DESYREL) 50 MG tablet, TAKE 1 TO 2 TABLETS BY MOUTH EVERY DAY AT BEDTIME AS NEEDED, Disp: 60 tablet, Rfl: 3    Review of Systems   Musculoskeletal: Positive for arthralgias, back pain, gait problem and neck pain.   Neurological: Positive for weakness and numbness.       Vitals:    09/02/21  "1317   BP: 157/100   Pulse: 67   Resp: 16   Temp: 96.8 °F (36 °C)   SpO2: 94%   Weight: (!) 168 kg (370 lb)   Height: 185.4 cm (73\")   PainSc:   2       Objective   Physical Exam  Vitals and nursing note reviewed.   Constitutional:       General: He is not in acute distress.     Appearance: Normal appearance. He is obese.   Musculoskeletal:      Comments: Thoracic spine exam:  1.  Tender to palpation thoracic paraspinals with positive facet loading especially on the right.  He has palpable muscle spasms with associated facet loading.    Lumbar spine exam:  1.  Mildly tender to palpation lumbar paraspinals  2.  Lumbar facet loading weakly positive bilaterally  3.  Lumbar facets minimally tender to palpation  4.  Straight leg raise equivocal on the right   Neurological:      Mental Status: He is alert.      Sensory: No sensory deficit.      Motor: No weakness.           Assessment/Plan   Problems Addressed this Visit        Other    Chronic midline low back pain with bilateral sciatica      Diagnoses       Codes Comments    Chronic midline low back pain with bilateral sciatica     ICD-10-CM: M54.41, M54.42, G89.29  ICD-9-CM: 724.2, 724.3, 338.29           Plan:  1. Doing significantly better following the thoracic medial branch blocks.  2. He states that now, he is able to stand approximately 30 minutes before he has significant pain.  This is in contrast to the 5 to 7 minutes he was previously standing.  3. He states he is becoming more active and normal walking.  He does have increased pain especially in the anterior thighs with prolonged walking as well as a sense of numbness and weakness which resolves when he sits down  4. I again reviewed his MRI which does show some disc bulge and foraminal stenosis.  We will plan for lumbar epidural steroid injection to treat this pain and continue to encourage physical activity  --- Follow-up next available for lumbar epidural steroid injection           INSPECT REPORT    As " part of the patient's treatment plan, I may be prescribing controlled substances. The patient has been made aware of appropriate use of such medications, including potential risk of somnolence, limited ability to drive and/or work safely, and the potential for dependence or overdose. It has also bee made clear that these medications are for use by this patient only, without concomitant use of alcohol or other substances unless prescribed.     Patient has completed prescribing agreement detailing terms of continued prescribing of controlled substances, including monitoring JOSE reports, urine drug screening, and pill counts if necessary. The patient is aware that inappropriate use will results in cessation of prescribing such medications.    INSPECT report has been reviewed and scanned into the patient's chart.    As the clinician, I personally reviewed the INSPECT from 9/1/2021.    History and physical exam exhibit continued safe and appropriate use of controlled substances.      EMR Dragon/Transcription disclaimer:   Much of this encounter note is an electronic transcription/translation of spoken language to printed text. The electronic translation of spoken language may permit erroneous, or at times, nonsensical words or phrases to be inadvertently transcribed; Although I have reviewed the note for such errors, some may still exist.

## 2021-09-23 ENCOUNTER — TELEPHONE (OUTPATIENT)
Dept: ORTHOPEDICS | Facility: OTHER | Age: 51
End: 2021-09-23

## 2021-09-23 ENCOUNTER — TELEPHONE (OUTPATIENT)
Dept: PAIN MEDICINE | Facility: CLINIC | Age: 51
End: 2021-09-23

## 2021-09-23 NOTE — TELEPHONE ENCOUNTER
Caller: Ang Yoon    Relationship to patient: Self    Best call back number: [phone]    Patient is needing: PATIENT WILL NOT BE ABLE TO MAKE 1:45PM LUMBAR EPIDURAL APPOINTMENT TODAY 09/23/21. TRIED TO REACH OFFICE FOR RESCHEDULING. PLEASE ADVISE.

## 2021-09-30 ENCOUNTER — TELEPHONE (OUTPATIENT)
Dept: PAIN MEDICINE | Facility: HOSPITAL | Age: 51
End: 2021-09-30

## 2021-09-30 NOTE — TELEPHONE ENCOUNTER
Patient called and is experiencing numbness in legs and incontinence. Per  patient is to go to ER to have imaging done and check for possible epidural hematoma. Patient voiced understanding but was unsure if he could go today since he has to take his son to appointment for COVID symptoms. I expressed the importance of patient gong today. He will try to go today or tomorrow at the latest.

## 2021-10-03 DIAGNOSIS — E78.2 MIXED HYPERLIPIDEMIA: ICD-10-CM

## 2021-10-04 RX ORDER — ROSUVASTATIN CALCIUM 20 MG/1
TABLET, COATED ORAL
Qty: 30 TABLET | Refills: 0 | Status: SHIPPED | OUTPATIENT
Start: 2021-10-04 | End: 2021-11-08

## 2021-10-06 DIAGNOSIS — M51.36 LUMBAR DEGENERATIVE DISC DISEASE: ICD-10-CM

## 2021-10-07 ENCOUNTER — TELEPHONE (OUTPATIENT)
Dept: PAIN MEDICINE | Facility: CLINIC | Age: 51
End: 2021-10-07

## 2021-10-07 DIAGNOSIS — M51.36 LUMBAR DEGENERATIVE DISC DISEASE: ICD-10-CM

## 2021-10-07 RX ORDER — HYDROCODONE BITARTRATE AND ACETAMINOPHEN 10; 325 MG/1; MG/1
1 TABLET ORAL 3 TIMES DAILY
Qty: 90 TABLET | Refills: 0 | Status: CANCELLED | OUTPATIENT
Start: 2021-10-07

## 2021-10-07 RX ORDER — HYDROCODONE BITARTRATE AND ACETAMINOPHEN 10; 325 MG/1; MG/1
1 TABLET ORAL 3 TIMES DAILY
Qty: 90 TABLET | Refills: 0 | Status: SHIPPED | OUTPATIENT
Start: 2021-10-07 | End: 2021-10-08 | Stop reason: SDUPTHER

## 2021-10-07 NOTE — TELEPHONE ENCOUNTER
I spoke with patient. I told him I tried to call Dr Baker office and they were closed and I will call them in the morning and then let him know I have called them.

## 2021-10-07 NOTE — TELEPHONE ENCOUNTER
I looked in chart and see that Patient is seeing Dr Baker pain management. I contacted patient to advise him to call pain management to ask for any refills of his norco. He voiced understanding. I also set him up to see Dr Aviles on 10/13/21 to discuss this and some other results with him.    I called pharmacy and cancelled his Norco sent in by Mily.

## 2021-10-07 NOTE — TELEPHONE ENCOUNTER
"Pt calling in regards to refill of hydrocodone. Pt states he spoke with Dr. Baker office and he is out of the office until 10/14 so he cannot get a refill from that office. Pt states Dr. Baker office also seemed to think he was trying to \"be shady\" by requesting a refill after one was already sent in today, but he explained to them that Mily had called and canceled the refill so he wasn't able to get the medication. Pt states he will run out of this medication before Dr. Baker gets back next Thursday and isn't sure if he should just go without the medication?   "

## 2021-10-07 NOTE — TELEPHONE ENCOUNTER
Please let pt know I will send his refill in for [place] and he can schedule his appt with your for his issues needing addressed by Dr. LUIS Bass

## 2021-10-07 NOTE — TELEPHONE ENCOUNTER
Caller: Ang Yoon    Relationship: Self      Medication requested (name and dosage):  HYDROCODONE  MG     Pharmacy where request should be sent: [facility]- IN PATIENT'S CHART    Additional details provided by patient:  PATIENT STATED THAT HIS PRIMARY CARE DOCTOR DR. TRAN IS UNDER THE IMPRESSION DR. MAYFIELD WILL NOW BE MANAGING HIS PAIN MEDICATION GABAPENTIN AND HYDROCODONE. PATIENT WANTS TO KNOW IF DR. MAYFIELD WILL SEND A PRESCRIPTION FOR HYDROCODONE. PATIENT STATED HE ONLY 2 DAYS OF MEDICATION LEFT AND 1 MONTH OF GABAPENTIN MEDICATION    Best call back number: [phone]    Does the patient have less than a 3 day supply:  [x] Yes  [] No    We'Blayne Horn Rep   10/07/21 15:37 EDT

## 2021-10-07 NOTE — TELEPHONE ENCOUNTER
Spoke with patient let him know  was out of the office until next week.  has not taken over narcotic management for patient he has only been getting injections. Patient is not currently under narcotic agreement with . Patient is going to call pcp to get norco script until next week where he can established for narcotic management with . Patient is on schedule for 10/14/21.

## 2021-10-08 ENCOUNTER — TELEPHONE (OUTPATIENT)
Dept: FAMILY MEDICINE CLINIC | Facility: CLINIC | Age: 51
End: 2021-10-08

## 2021-10-08 DIAGNOSIS — M51.36 LUMBAR DEGENERATIVE DISC DISEASE: ICD-10-CM

## 2021-10-08 RX ORDER — HYDROCODONE BITARTRATE AND ACETAMINOPHEN 10; 325 MG/1; MG/1
1 TABLET ORAL 3 TIMES DAILY
Qty: 21 TABLET | Refills: 0 | Status: SHIPPED | OUTPATIENT
Start: 2021-10-08 | End: 2021-10-14

## 2021-10-08 NOTE — TELEPHONE ENCOUNTER
I spoke with pain management. Explained to her why I cancelled the norco refill, that it was me that cancelled it after I saw in chart that pt had a contract with pain mgt, and it wasn't any reason on patient being shady.    They also let me know that patient had appt with them on 10/14/21.

## 2021-10-08 NOTE — TELEPHONE ENCOUNTER
I spoke with patient, and pain mgt office. He has appt with them on 10/14 where they will discuss the norco prescription.    Dr Aviles has previously given him norco rx.    Patient is just asking if he can get a 7 day supply to last him til the pain mgt appt on 10/14.  (this is the one that you filled 30 day supply yesterday and I called pharmacy to cancel it due to him seeing pain mgt) sorry for the confusion!

## 2021-10-08 NOTE — TELEPHONE ENCOUNTER
I set patient up with an appt with you on 10/13 to discuss Dr Cisneros test results, he was confused with the result message from their office.    He is calling back to inquire if this can be a video visit?

## 2021-10-09 NOTE — TELEPHONE ENCOUNTER
Please tell Bill that yes, we can do this as a video visit. Please see if we get that changed accordingly on the schedule. Thanks

## 2021-10-13 ENCOUNTER — TELEMEDICINE (OUTPATIENT)
Dept: FAMILY MEDICINE CLINIC | Facility: CLINIC | Age: 51
End: 2021-10-13

## 2021-10-13 DIAGNOSIS — I25.10 CHRONIC CORONARY ARTERY DISEASE: Primary | ICD-10-CM

## 2021-10-13 DIAGNOSIS — E66.01 CLASS 3 SEVERE OBESITY DUE TO EXCESS CALORIES WITH SERIOUS COMORBIDITY AND BODY MASS INDEX (BMI) OF 50.0 TO 59.9 IN ADULT (HCC): ICD-10-CM

## 2021-10-13 DIAGNOSIS — M51.36 LUMBAR DEGENERATIVE DISC DISEASE: ICD-10-CM

## 2021-10-13 PROCEDURE — 99215 OFFICE O/P EST HI 40 MIN: CPT | Performed by: FAMILY MEDICINE

## 2021-10-13 NOTE — PROGRESS NOTES
"Subjective   Ang Yoon is a 51 y.o. male.   Chief Complaint   Patient presents with   • test question       History of Present Illness   Patient has questions regarding a test from Dr Cisneros.  Above reviewed.  Johnnie is evaluated today by video visit using two-way communication with both video and audio capabilities.  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  The use of a video visit has been reviewed with the patient and verbal informed consent has been obtained.      Will wants to follow-up on 2 problems of he has been dealing with other specialist with.  First of all his his chronic back pain.  He has been to see neurosurgery and he has been told he is not a candidate for any type of surgery.  I referred him to Dr. Baker and santa and he has been working with him.  He has been giving him shots in his back.  Johnnie tells us that the shots actually do decrease the pain locally where the injections are, but he still has pain surrounding that area.  The shots oftentimes will last nearly 3 weeks.  He feels that it dulls the pain enough that he feels like he can do more.  He has had a couple occasions after these injections whereby he \"overdid it\" and had a few falls and once had encopresis.  He has decided he wants to try to get off of Norco.  He is taking on average 1-1/2/day now.  He plans to taper down over the next month.      His second problem is chest pains.  Heart cath was done in April 2018 by Dr. Ynag.  Old records indicate his EF at that time was 60%.  He had a 50% mid LAD lesion with myocardial bridging.  Otherwise he had no coronary disease.  He had a stress test in 2019 that had a large anterior defect.  He had been following with Dr. Yang for years and he wanted another opinion from a different cardiologist.  I recommended he see Dr. Cisneros who did an EKG and felt that the appearance is on his previous tests were all consistent and stable and that he could continue to be managed " medically.  Johnnie tells me that he has been having more chest pains lately.  He describes as being in the center of his chest.  They happen a lot at night.  He has had to take a nitroglycerin because of this as often as once a week.  He tells me that sometimes when he has this chest pressure or go up into his left neck and shoulder and sometimes down into his hand.    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   • Hypogonadism 06/13/2018   • Other problems related to lifestyle 04/24/2018   • Regular astigmatism 06/01/2017   • Presbyopia 06/01/2017   • Myopia 06/01/2017   • Chronic coronary artery disease 09/19/2012   • Cardiac disease 09/19/2012   • Cyst of meniscus of knee 06/22/2012   • Resting tremor 03/05/2012   • Degenerative joint disease 02/08/2012   • Hypertension 01/18/2012   • Anxiety disorder 01/18/2012   • Obsessive-compulsive disorder 01/18/2012   • Memory loss 01/16/2012           Past Surgical History:   Procedure Laterality Date   • CARDIAC CATHETERIZATION  04/2018   • KNEE ARTHROSCOPY Right 07/09/2012     Current Outpatient Medications on File Prior to Visit   Medication Sig   • albuterol sulfate  (90 Base) MCG/ACT inhaler INHALE 2 PUFFS BY MOUTH EVERY 4 HOURS AS NEEDED FOR WHEEZING   • DULoxetine (CYMBALTA) 30 MG capsule Take 1 capsule by mouth Daily.   • EQ Aspirin Adult Low Dose 81 MG EC tablet Take 1 tablet by mouth once daily   • fluticasone (Flonase) 50 MCG/ACT nasal spray 2 sprays into  the nostril(s) as directed by provider Daily.   • gabapentin (NEURONTIN) 300 MG capsule TAKE 1 CAPSULE BY MOUTH THREE TIMES DAILY   • HYDROcodone-acetaminophen (Norco)  MG per tablet Take 1 tablet by mouth 3 (Three) Times a Day.   • isosorbide mononitrate (IMDUR) 30 MG 24 hr tablet TAKE 1 TABLET BY MOUTH ONCE DAILY IN THE MORNING   • lidocaine (LIDODERM) 5 % Place 1 patch on the skin as directed by provider Daily. Remove & Discard patch within 12 hours or as directed by MD   • melatonin 5 MG tablet tablet Take 1 tablet by mouth At Night As Needed (insomnia).   • naproxen sodium (ALEVE) 220 MG tablet Take 220 mg by mouth 2 (Two) Times a Day As Needed.   • nitroglycerin (Nitrostat) 0.4 MG SL tablet Place 1 tablet under the tongue Every 5 (Five) Minutes As Needed for Chest Pain. Take no more than 3 doses in 15 minutes.   • propranolol (INDERAL) 80 MG tablet Take 1 tablet by mouth twice daily   • rosuvastatin (CRESTOR) 20 MG tablet TAKE 1 TABLET BY MOUTH ONCE DAILY AT BEDTIME   • traZODone (DESYREL) 50 MG tablet TAKE 1 TO 2 TABLETS BY MOUTH EVERY DAY AT BEDTIME AS NEEDED     No current facility-administered medications on file prior to visit.     Allergies   Allergen Reactions   • Niacin Rash     Social History     Socioeconomic History   • Marital status: Single   Tobacco Use   • Smoking status: Former Smoker     Packs/day: 0.25     Years: 3.00     Pack years: 0.75     Types: Cigarettes     Start date: 1988     Quit date: 1991     Years since quittin.8   • Smokeless tobacco: Never Used   • Tobacco comment: social off and on, until  (meaning one or two cigarettes' a week.)   Vaping Use   • Vaping Use: Never used   Substance and Sexual Activity   • Alcohol use: No   • Drug use: No   • Sexual activity: Defer     Family History   Problem Relation Age of Onset   • Heart disease Other    • Hypertension Other    • Stroke Other    • Prostate cancer Other    • Hearing loss Mother    • Asthma Brother          childhood   • Asthma Daughter    • Arthritis Maternal Grandmother    • Arthritis Maternal Grandfather    • Diabetes Maternal Grandfather    • Stroke Maternal Grandfather    • Heart attack Maternal Grandfather        Review of Systems    Objective   There were no vitals taken for this visit.  Physical Exam  Constitutional:       General: He is not in acute distress.     Appearance: He is well-developed.   Pulmonary:      Effort: Pulmonary effort is normal.   Neurological:      Mental Status: He is alert and oriented to person, place, and time.   Psychiatric:         Speech: Speech normal.         Judgment: Judgment normal.         Assessment/Plan   Diagnoses and all orders for this visit:    1. Chronic coronary artery disease (Primary)  -     Ambulatory Referral to Cardiology    2. Lumbar degenerative disc disease    3. Class 3 severe obesity due to excess calories with serious comorbidity and body mass index (BMI) of 50.0 to 59.9 in adult (HCC)      Overall, I spent around 45 minutes or even a little bit longer on the video call with Johnnie, discussing all the above.  With regard to his back, I agree with trying to taper and stop the hydrocodone and see just where he is and see if this can be managed with nonmedication interventions.  We did talk about the contribution of his weight to back problems.  His insurance does not cover bariatric surgery.  His last recorded weight in the chart was 370 pounds and with the possibility of a heart problem none of the stimulant-based weight loss medications would be safe to even try.  I do think some more aggressive intervention for weight loss will be necessary and probably very helpful to the long-term management strategy for his back.  Before we can even consider that, we have to know if these chest pains he is having are actually unstable angina.  We will make a referral outside of our network for a completely fresh opinion from a cardiologist previously uninvolved.  We  will make a referral to Dr. Powell  We will leave the door open to follow-up with Bill after he has tapered the hydrocodone and after he has had further follow-ups with pain management and with cardiology.         Call with any problems or concerns before next visit  Return if symptoms worsen or fail to improve.      Much of this report is an electronic transcription of spoken language to printed text using Dragon dictation software.  As such, the subtleties and finesse of spoken language may permit erroneous, or at times, nonsensical words or phrases to be inadvertently transcribed; thus changes may be made at a later date to rectify these errors.

## 2021-10-14 ENCOUNTER — OFFICE VISIT (OUTPATIENT)
Dept: PAIN MEDICINE | Facility: CLINIC | Age: 51
End: 2021-10-14

## 2021-10-14 VITALS
HEART RATE: 71 BPM | OXYGEN SATURATION: 97 % | RESPIRATION RATE: 16 BRPM | DIASTOLIC BLOOD PRESSURE: 92 MMHG | WEIGHT: 315 LBS | BODY MASS INDEX: 41.75 KG/M2 | SYSTOLIC BLOOD PRESSURE: 166 MMHG | HEIGHT: 73 IN

## 2021-10-14 DIAGNOSIS — M51.36 LUMBAR DEGENERATIVE DISC DISEASE: ICD-10-CM

## 2021-10-14 PROCEDURE — 99214 OFFICE O/P EST MOD 30 MIN: CPT | Performed by: STUDENT IN AN ORGANIZED HEALTH CARE EDUCATION/TRAINING PROGRAM

## 2021-10-14 RX ORDER — HYDROCODONE BITARTRATE AND ACETAMINOPHEN 5; 325 MG/1; MG/1
1 TABLET ORAL EVERY 8 HOURS PRN
Qty: 21 TABLET | Refills: 0 | Status: SHIPPED | OUTPATIENT
Start: 2021-10-21 | End: 2022-04-12

## 2021-10-14 RX ORDER — HYDROCODONE BITARTRATE AND ACETAMINOPHEN 7.5; 325 MG/1; MG/1
1 TABLET ORAL EVERY 8 HOURS PRN
Qty: 21 TABLET | Refills: 0 | Status: SHIPPED | OUTPATIENT
Start: 2021-10-14 | End: 2021-11-19

## 2021-10-14 RX ORDER — DULOXETIN HYDROCHLORIDE 60 MG/1
60 CAPSULE, DELAYED RELEASE ORAL DAILY
Qty: 30 CAPSULE | Refills: 1 | Status: SHIPPED | OUTPATIENT
Start: 2021-10-14 | End: 2021-12-29 | Stop reason: SDUPTHER

## 2021-10-14 NOTE — PROGRESS NOTES
CHIEF COMPLAINT  Chief Complaint   Patient presents with   • Back Pain     Upper back pain Hydrocodone LD 10/13@8pm       Primary Care  Traci Bass MD    Subjective   Ang Yoon is a 51 y.o. male  who presents for chronic mid back pain with associated right lower extremity neurogenic medication.  He states that the pain began approximately 2 years ago when he was lifting a piece of heavy machinery.  He states that since that time, he has had continued mid back pain which radiates into the right scapula.  The pain is worse with prolonged standing and improved with sitting and rest.  The pain makes it difficult for him to perform his daily activities.  He describes the pain as a sharp, stabbing pain in the mid back into his right scapula.  He also has some neurogenic claudication with numb and tingly right lower extremity especially walking which resolves with rest.  He has been evaluated by the neurosurgeon as well as primary care who did not see any surgical lesions    Back Pain  Associated symptoms include numbness and weakness.   Neck Pain   Associated symptoms include numbness and weakness.        Location: Mid back with radiation into the right shoulder, low back with right lower extremity neurogenic claudication  Onset: 2 years  Duration: Progressively worsening  Timing: Constant throughout the day  Quality: Sharp, stabbing with numbness and tingling of the right lower extremity  Severity: Today: 7       Last Week: 3       Worst: 8  Modifying Factors: The pain is worse with movement physical activity and standing and improves with sitting and resting    Physical Therapy: yes    Interval Update 10/14/2021: Today, he rates his pain at a 7 however it is somewhat difficult to determine why his pain is much worse than his previous office visit.  It appears that his mid back pain is improved but he continues to have some myofascial type pain.  He is also complaining of significant neurogenic  claudication type symptoms in the legs bilaterally with prolonged walking.  He is also interested in weaning off of hydrocodone.    The following portions of the patient's history were reviewed and updated as appropriate: allergies, current medications, past family history, past medical history, past social history, past surgical history and problem list.      Current Outpatient Medications:   •  albuterol sulfate  (90 Base) MCG/ACT inhaler, INHALE 2 PUFFS BY MOUTH EVERY 4 HOURS AS NEEDED FOR WHEEZING, Disp: 9 g, Rfl: 0  •  DULoxetine (CYMBALTA) 60 MG capsule, Take 1 capsule by mouth Daily., Disp: 30 capsule, Rfl: 1  •  EQ Aspirin Adult Low Dose 81 MG EC tablet, Take 1 tablet by mouth once daily, Disp: 30 tablet, Rfl: 3  •  fluticasone (Flonase) 50 MCG/ACT nasal spray, 2 sprays into the nostril(s) as directed by provider Daily., Disp: 1 bottle, Rfl: 2  •  gabapentin (NEURONTIN) 300 MG capsule, TAKE 1 CAPSULE BY MOUTH THREE TIMES DAILY, Disp: 90 capsule, Rfl: 5  •  isosorbide mononitrate (IMDUR) 30 MG 24 hr tablet, TAKE 1 TABLET BY MOUTH ONCE DAILY IN THE MORNING, Disp: 30 tablet, Rfl: 2  •  lidocaine (LIDODERM) 5 %, Place 1 patch on the skin as directed by provider Daily. Remove & Discard patch within 12 hours or as directed by MD, Disp: 30 patch, Rfl: 1  •  melatonin 5 MG tablet tablet, Take 1 tablet by mouth At Night As Needed (insomnia)., Disp: 90 tablet, Rfl: 2  •  naproxen sodium (ALEVE) 220 MG tablet, Take 220 mg by mouth 2 (Two) Times a Day As Needed., Disp: , Rfl:   •  nitroglycerin (Nitrostat) 0.4 MG SL tablet, Place 1 tablet under the tongue Every 5 (Five) Minutes As Needed for Chest Pain. Take no more than 3 doses in 15 minutes., Disp: 25 tablet, Rfl: 2  •  propranolol (INDERAL) 80 MG tablet, Take 1 tablet by mouth twice daily, Disp: 60 tablet, Rfl: 5  •  rosuvastatin (CRESTOR) 20 MG tablet, TAKE 1 TABLET BY MOUTH ONCE DAILY AT BEDTIME, Disp: 30 tablet, Rfl: 0  •  traZODone (DESYREL) 50 MG tablet,  "TAKE 1 TO 2 TABLETS BY MOUTH EVERY DAY AT BEDTIME AS NEEDED, Disp: 60 tablet, Rfl: 3  •  [START ON 10/21/2021] HYDROcodone-acetaminophen (NORCO) 5-325 MG per tablet, Take 1 tablet by mouth Every 8 (Eight) Hours As Needed for Severe Pain ., Disp: 21 tablet, Rfl: 0  •  HYDROcodone-acetaminophen (NORCO) 7.5-325 MG per tablet, Take 1 tablet by mouth Every 8 (Eight) Hours As Needed for Moderate Pain ., Disp: 21 tablet, Rfl: 0    Review of Systems   Musculoskeletal: Positive for arthralgias, back pain, gait problem and neck pain.   Neurological: Positive for weakness and numbness.       Vitals:    10/14/21 1413   BP: 166/92   Pulse: 71   Resp: 16   SpO2: 97%   Weight: (!) 163 kg (360 lb)   Height: 185.4 cm (73\")   PainSc:   7       Objective   Physical Exam  Vitals and nursing note reviewed.   Constitutional:       General: He is not in acute distress.     Appearance: Normal appearance. He is obese.   Musculoskeletal:      Comments: Thoracic spine exam:  1.  Tender to palpation thoracic paraspinals with positive facet loading especially on the right.  He has palpable muscle spasms with associated facet loading.    Lumbar spine exam:  1.  Mildly tender to palpation lumbar paraspinals  2.  Lumbar facet loading weakly positive bilaterally  3.  Lumbar facets minimally tender to palpation  4.  Straight leg raise equivocal on the right   Neurological:      Mental Status: He is alert.      Sensory: No sensory deficit.      Motor: No weakness.           Assessment/Plan   Problems Addressed this Visit        Other    Lumbar degenerative disc disease    Relevant Medications    HYDROcodone-acetaminophen (NORCO) 7.5-325 MG per tablet    HYDROcodone-acetaminophen (NORCO) 5-325 MG per tablet (Start on 10/21/2021)    Other Relevant Orders    Epidural Block      Diagnoses       Codes Comments    Lumbar degenerative disc disease     ICD-10-CM: M51.36  ICD-9-CM: 722.52           Plan:  1. Doing significantly better following the thoracic " medial branch blocks.  2. He states that he continues to be more physically active than prior to medial branch blocks  3. His anterior thigh pain is likely related to neurogenic claudication as he states that when he sits down the pain improves  4. I again reviewed his MRI which does show some disc bulge and foraminal stenosis.  We will plan for lumbar epidural steroid injection to treat this pain and continue to encourage physical activity  5. He is wanting to wean off of hydrocodone.  We will send in weaning doses of hydrocodone with the idea that in the next 2 to 3 weeks he will be able to discontinue his hydrocodone.  I will also increase his Cymbalta to 60 mg daily  6. Reiterated the importance of the lumbar epidural for both diagnostic and therapeutic purposes.  If he gets benefit from the lumbar epidural but it wears off quickly, may consider reevaluation by the neurosurgeon.  --- Follow-up next available for lumbar epidural steroid injection           INSPECT REPORT    As part of the patient's treatment plan, I may be prescribing controlled substances. The patient has been made aware of appropriate use of such medications, including potential risk of somnolence, limited ability to drive and/or work safely, and the potential for dependence or overdose. It has also bee made clear that these medications are for use by this patient only, without concomitant use of alcohol or other substances unless prescribed.     Patient has completed prescribing agreement detailing terms of continued prescribing of controlled substances, including monitoring JOSE reports, urine drug screening, and pill counts if necessary. The patient is aware that inappropriate use will results in cessation of prescribing such medications.    INSPECT report has been reviewed and scanned into the patient's chart.    As the clinician, I personally reviewed the INSPECT from 10/13/2021.    History and physical exam exhibit continued safe and  appropriate use of controlled substances.      EMR Dragon/Transcription disclaimer:   Much of this encounter note is an electronic transcription/translation of spoken language to printed text. The electronic translation of spoken language may permit erroneous, or at times, nonsensical words or phrases to be inadvertently transcribed; Although I have reviewed the note for such errors, some may still exist.

## 2021-10-21 ENCOUNTER — HOSPITAL ENCOUNTER (OUTPATIENT)
Dept: PAIN MEDICINE | Facility: HOSPITAL | Age: 51
Discharge: HOME | End: 2021-10-21

## 2021-10-21 VITALS
TEMPERATURE: 96.9 F | DIASTOLIC BLOOD PRESSURE: 91 MMHG | BODY MASS INDEX: 41.75 KG/M2 | HEART RATE: 58 BPM | SYSTOLIC BLOOD PRESSURE: 152 MMHG | OXYGEN SATURATION: 97 % | WEIGHT: 315 LBS | HEIGHT: 73 IN | RESPIRATION RATE: 16 BRPM

## 2021-10-21 DIAGNOSIS — G89.29 CHRONIC MIDLINE LOW BACK PAIN WITH BILATERAL SCIATICA: Primary | ICD-10-CM

## 2021-10-21 DIAGNOSIS — M54.41 CHRONIC MIDLINE LOW BACK PAIN WITH BILATERAL SCIATICA: Primary | ICD-10-CM

## 2021-10-21 DIAGNOSIS — M54.42 CHRONIC MIDLINE LOW BACK PAIN WITH BILATERAL SCIATICA: Primary | ICD-10-CM

## 2021-10-21 DIAGNOSIS — R52 PAIN: ICD-10-CM

## 2021-10-21 PROCEDURE — 25010000002 METHYLPREDNISOLONE PER 40 MG: Performed by: STUDENT IN AN ORGANIZED HEALTH CARE EDUCATION/TRAINING PROGRAM

## 2021-10-21 PROCEDURE — 62323 NJX INTERLAMINAR LMBR/SAC: CPT | Performed by: STUDENT IN AN ORGANIZED HEALTH CARE EDUCATION/TRAINING PROGRAM

## 2021-10-21 PROCEDURE — 0 IOPAMIDOL 41 % SOLUTION: Performed by: STUDENT IN AN ORGANIZED HEALTH CARE EDUCATION/TRAINING PROGRAM

## 2021-10-21 PROCEDURE — 77003 FLUOROGUIDE FOR SPINE INJECT: CPT

## 2021-10-21 RX ORDER — BUPIVACAINE HYDROCHLORIDE 2.5 MG/ML
5 INJECTION, SOLUTION EPIDURAL; INFILTRATION; INTRACAUDAL ONCE
Status: COMPLETED | OUTPATIENT
Start: 2021-10-21 | End: 2021-10-21

## 2021-10-21 RX ORDER — METHYLPREDNISOLONE ACETATE 40 MG/ML
40 INJECTION, SUSPENSION INTRA-ARTICULAR; INTRALESIONAL; INTRAMUSCULAR; SOFT TISSUE ONCE
Status: COMPLETED | OUTPATIENT
Start: 2021-10-21 | End: 2021-10-21

## 2021-10-21 RX ORDER — ISOSORBIDE MONONITRATE 30 MG/1
TABLET, EXTENDED RELEASE ORAL
Qty: 30 TABLET | Refills: 0 | Status: SHIPPED | OUTPATIENT
Start: 2021-10-21 | End: 2021-11-17

## 2021-10-21 RX ORDER — BUPIVACAINE HYDROCHLORIDE 5 MG/ML
10 INJECTION, SOLUTION EPIDURAL; INTRACAUDAL ONCE
Status: DISCONTINUED | OUTPATIENT
Start: 2021-10-21 | End: 2021-10-22 | Stop reason: HOSPADM

## 2021-10-21 RX ORDER — METHYLPREDNISOLONE ACETATE 40 MG/ML
40 INJECTION, SUSPENSION INTRA-ARTICULAR; INTRALESIONAL; INTRAMUSCULAR; SOFT TISSUE ONCE
Status: DISCONTINUED | OUTPATIENT
Start: 2021-10-21 | End: 2021-10-22 | Stop reason: HOSPADM

## 2021-10-21 RX ADMIN — METHYLPREDNISOLONE ACETATE 40 MG: 40 INJECTION, SUSPENSION INTRA-ARTICULAR; INTRALESIONAL; INTRAMUSCULAR; SOFT TISSUE at 15:07

## 2021-10-21 RX ADMIN — IOPAMIDOL 5 ML: 408 INJECTION, SOLUTION INTRATHECAL at 15:07

## 2021-10-21 RX ADMIN — BUPIVACAINE HYDROCHLORIDE 5 ML: 2.5 INJECTION, SOLUTION EPIDURAL; INFILTRATION; INTRACAUDAL; PERINEURAL at 15:07

## 2021-10-21 NOTE — PROCEDURES
Lumbar Epidural Steroid Injection  [facility]    PREOPERATIVE DIAGNOSIS:   Chronic low back pain, Lumbar Degenerative Disc Disease, Lumbar Spinal Stenosis without Neurogenic Claudication and Lumbar Disc Displacement  POSTOPERATIVE DIAGNOSIS:  Same as preop diagnosis    PROCEDURE:   Lumbar Epidural Steroid Injection, Therapeutic Translaminar Injection, with epidurogram, at  L4/L5 level    PRE-PROCEDURE DISCUSSION WITH PATIENT:    Risks and complications were discussed with the patient prior to starting the procedure and informed consent was obtained.  We discussed various topics including but not limited to bleeding, infection, injury, paralysis, nerve injury, dural puncture, coma, death, worsening of clinical picture, lack of pain relief, and postprocedural soreness.    SURGEON:  Nahid Baker MD    REASON FOR PROCEDURE:    Degenerative changes are noted in the area., Stenotic area is noted, and is likely contributing to this chronic &/or recurrent pain.  and Radiating pattern of pain is likely consistent with degenerative changes in the area.    SEDATION:  Patient declined administration of moderate sedation    ANESTHETIC:  Marcaine 0.25%  STEROID:   Methylprednisolone (DEPO MEDROL) 40mg/ml    DESCRIPTON OF PROCEDURE:    After obtaining informed consent, I.V. was not started in the preop area.   The patient was taken to the operating room and placed in the prone position.  All pressure points were well padded.  The lumbar spine area was prepped with Chloraprep and draped in a sterile fashion.  Under fluoroscopic guidance, the above mentioned interlaminar space was identified. Skin and subcutaneous tissues were anesthetized with 1% lidocaine in the middle of the space. A Tuohy needle was introduced through the skin and advanced to this interlaminar space and into the epidural space under fluoroscopic guidance and verified with loss-of-resistance technique to air.  After confirming the position of the  needle with the fluoroscope with all the views, and after aspiration was confirmed negative for blood and CSF, 1.5 mL of Omnipaque was injected.  After seeing appropriate epidurogram with lateral and PA views, a total of 4 cc solution was injected, consisting of 3cc of local anesthetic as above, with normal saline and injectable steroid as above.     ESTIMATED BLOOD LOSS:  <5 mL  SPECIMENS:  None    COMPLICATIONS:     No complications were noted., There was no indication of vascular uptake on live injection of contrast dye., There was no indication of intrathecal uptake on live injection of contrast dye. and There was not any evidence of dural puncture.      TOLERANCE & DISCHARGE CONDITION:    The patient tolerated the procedure well.  The patient was transported to the recovery area without difficulties.  The patient was discharged to home under the care of family in stable and satisfactory condition.    PLAN OF CARE:  1. The patient was given our standard instruction sheet.  2. The patient will Return to clinic 3-4 wks  3. The patient will resume all medications as per the medication reconciliation sheet.

## 2021-11-08 DIAGNOSIS — E78.2 MIXED HYPERLIPIDEMIA: ICD-10-CM

## 2021-11-08 RX ORDER — ROSUVASTATIN CALCIUM 20 MG/1
TABLET, COATED ORAL
Qty: 30 TABLET | Refills: 5 | Status: SHIPPED | OUTPATIENT
Start: 2021-11-08 | End: 2022-04-27

## 2021-11-08 RX ORDER — PROPRANOLOL HYDROCHLORIDE 80 MG/1
TABLET ORAL
Qty: 60 TABLET | Refills: 5 | Status: SHIPPED | OUTPATIENT
Start: 2021-11-08 | End: 2022-07-18

## 2021-11-17 RX ORDER — ISOSORBIDE MONONITRATE 30 MG/1
TABLET, EXTENDED RELEASE ORAL
Qty: 30 TABLET | Refills: 0 | Status: SHIPPED | OUTPATIENT
Start: 2021-11-17 | End: 2021-12-28

## 2021-11-19 ENCOUNTER — OFFICE VISIT (OUTPATIENT)
Dept: PAIN MEDICINE | Facility: CLINIC | Age: 51
End: 2021-11-19

## 2021-11-19 VITALS
HEIGHT: 73 IN | SYSTOLIC BLOOD PRESSURE: 132 MMHG | DIASTOLIC BLOOD PRESSURE: 80 MMHG | TEMPERATURE: 97.1 F | OXYGEN SATURATION: 93 % | WEIGHT: 315 LBS | BODY MASS INDEX: 41.75 KG/M2 | HEART RATE: 62 BPM | RESPIRATION RATE: 16 BRPM

## 2021-11-19 DIAGNOSIS — M47.814 THORACIC SPONDYLOSIS: ICD-10-CM

## 2021-11-19 DIAGNOSIS — M51.36 LUMBAR DEGENERATIVE DISC DISEASE: Primary | ICD-10-CM

## 2021-11-19 PROCEDURE — 99214 OFFICE O/P EST MOD 30 MIN: CPT | Performed by: STUDENT IN AN ORGANIZED HEALTH CARE EDUCATION/TRAINING PROGRAM

## 2021-11-21 NOTE — PROGRESS NOTES
CHIEF COMPLAINT  Chief Complaint   Patient presents with   • Back Pain     NORCO 5-325MG LAST DOSE 11/18/21 1900. HAVING 1 NORCO 5-325MG NIGHTLY STARTED 4 DAYS AGO.        Primary Care  Traci Bass MD    Subjective   Ang Yoon is a 51 y.o. male  who presents for chronic mid back pain with associated right lower extremity neurogenic medication.  He states that the pain began approximately 2 years ago when he was lifting a piece of heavy machinery.  He states that since that time, he has had continued mid back pain which radiates into the right scapula.  The pain is worse with prolonged standing and improved with sitting and rest.  The pain makes it difficult for him to perform his daily activities.  He describes the pain as a sharp, stabbing pain in the mid back into his right scapula.  He also has some neurogenic claudication with numb and tingly right lower extremity especially walking which resolves with rest.  He has been evaluated by the neurosurgeon as well as primary care who did not see any surgical lesions    Back Pain  Associated symptoms include numbness and weakness.   Neck Pain   Associated symptoms include numbness and weakness.        Location: Mid back with radiation into the right shoulder, low back with right lower extremity neurogenic claudication  Onset: 2 years  Duration: Progressively worsening  Timing: Constant throughout the day  Quality: Sharp, stabbing with numbness and tingling of the right lower extremity  Severity: Today: 3       Last Week: 3       Worst: 8  Modifying Factors: The pain is worse with movement physical activity and standing and improves with sitting and resting    Physical Therapy: yes    Interval Update 11/19/2021: Much improved with the LESI and TMBB.  He also continues to reduce his dosage of narcotics without issue.      The following portions of the patient's history were reviewed and updated as appropriate: allergies, current medications, past  family history, past medical history, past social history, past surgical history and problem list.      Current Outpatient Medications:   •  albuterol sulfate  (90 Base) MCG/ACT inhaler, INHALE 2 PUFFS BY MOUTH EVERY 4 HOURS AS NEEDED FOR WHEEZING, Disp: 9 g, Rfl: 0  •  DULoxetine (CYMBALTA) 60 MG capsule, Take 1 capsule by mouth Daily., Disp: 30 capsule, Rfl: 1  •  EQ Aspirin Adult Low Dose 81 MG EC tablet, Take 1 tablet by mouth once daily, Disp: 30 tablet, Rfl: 3  •  fluticasone (Flonase) 50 MCG/ACT nasal spray, 2 sprays into the nostril(s) as directed by provider Daily., Disp: 1 bottle, Rfl: 2  •  gabapentin (NEURONTIN) 300 MG capsule, TAKE 1 CAPSULE BY MOUTH THREE TIMES DAILY (Patient taking differently: Take 300 mg by mouth 2 (Two) Times a Day.), Disp: 90 capsule, Rfl: 5  •  HYDROcodone-acetaminophen (NORCO) 5-325 MG per tablet, Take 1 tablet by mouth Every 8 (Eight) Hours As Needed for Severe Pain ., Disp: 21 tablet, Rfl: 0  •  isosorbide mononitrate (IMDUR) 30 MG 24 hr tablet, TAKE 1 TABLET BY MOUTH ONCE DAILY IN THE MORNING, Disp: 30 tablet, Rfl: 0  •  lidocaine (LIDODERM) 5 %, Place 1 patch on the skin as directed by provider Daily. Remove & Discard patch within 12 hours or as directed by MD, Disp: 30 patch, Rfl: 1  •  melatonin 5 MG tablet tablet, Take 1 tablet by mouth At Night As Needed (insomnia)., Disp: 90 tablet, Rfl: 2  •  naproxen sodium (ALEVE) 220 MG tablet, Take 220 mg by mouth 2 (Two) Times a Day As Needed., Disp: , Rfl:   •  nitroglycerin (Nitrostat) 0.4 MG SL tablet, Place 1 tablet under the tongue Every 5 (Five) Minutes As Needed for Chest Pain. Take no more than 3 doses in 15 minutes., Disp: 25 tablet, Rfl: 2  •  propranolol (INDERAL) 80 MG tablet, Take 1 tablet by mouth twice daily, Disp: 60 tablet, Rfl: 5  •  rosuvastatin (CRESTOR) 20 MG tablet, TAKE 1 TABLET BY MOUTH ONCE DAILY AT BEDTIME, Disp: 30 tablet, Rfl: 5  •  traZODone (DESYREL) 50 MG tablet, TAKE 1 TO 2 TABLETS BY MOUTH  "EVERY DAY AT BEDTIME AS NEEDED, Disp: 60 tablet, Rfl: 3    Review of Systems   Musculoskeletal: Positive for arthralgias, back pain, gait problem and neck pain.   Neurological: Positive for weakness and numbness.       Vitals:    11/19/21 1504   BP: 132/80   Pulse: 62   Resp: 16   Temp: 97.1 °F (36.2 °C)   SpO2: 93%   Weight: (!) 163 kg (360 lb)   Height: 185.4 cm (73\")   PainSc:   3       Objective   Physical Exam  Vitals and nursing note reviewed.   Constitutional:       General: He is not in acute distress.     Appearance: Normal appearance. He is obese.   Musculoskeletal:      Comments: Thoracic spine exam:  1.  Tender to palpation thoracic paraspinals with positive facet loading especially on the right.  He has palpable muscle spasms with associated facet loading.    Lumbar spine exam:  1.  Mildly tender to palpation lumbar paraspinals  2.  Lumbar facet loading weakly positive bilaterally  3.  Lumbar facets minimally tender to palpation  4.  Straight leg raise equivocal on the right   Neurological:      Mental Status: He is alert.      Sensory: No sensory deficit.      Motor: No weakness.           Assessment/Plan   Problems Addressed this Visit        Other    Lumbar degenerative disc disease - Primary      Other Visit Diagnoses     Thoracic spondylosis          Diagnoses       Codes Comments    Lumbar degenerative disc disease    -  Primary ICD-10-CM: M51.36  ICD-9-CM: 722.52     Thoracic spondylosis     ICD-10-CM: M47.814  ICD-9-CM: 721.2           Plan:  1. Continue duloxetine 60mg   2. Excellent benefit from TMBB and LESI  3. Will continue to wean Norco.  Likely done with the medication as of next week.  --- Follow-up 2-3 mos.           INSPECT REPORT    As part of the patient's treatment plan, I may be prescribing controlled substances. The patient has been made aware of appropriate use of such medications, including potential risk of somnolence, limited ability to drive and/or work safely, and the " potential for dependence or overdose. It has also bee made clear that these medications are for use by this patient only, without concomitant use of alcohol or other substances unless prescribed.     Patient has completed prescribing agreement detailing terms of continued prescribing of controlled substances, including monitoring JOSE reports, urine drug screening, and pill counts if necessary. The patient is aware that inappropriate use will results in cessation of prescribing such medications.    INSPECT report has been reviewed and scanned into the patient's chart.    As the clinician, I personally reviewed the INSPECT from 11/18/21.    History and physical exam exhibit continued safe and appropriate use of controlled substances.      EMR Dragon/Transcription disclaimer:   Much of this encounter note is an electronic transcription/translation of spoken language to printed text. The electronic translation of spoken language may permit erroneous, or at times, nonsensical words or phrases to be inadvertently transcribed; Although I have reviewed the note for such errors, some may still exist.

## 2021-12-28 RX ORDER — ISOSORBIDE MONONITRATE 30 MG/1
TABLET, EXTENDED RELEASE ORAL
Qty: 30 TABLET | Refills: 0 | Status: SHIPPED | OUTPATIENT
Start: 2021-12-28 | End: 2022-02-07

## 2021-12-29 RX ORDER — DULOXETIN HYDROCHLORIDE 60 MG/1
60 CAPSULE, DELAYED RELEASE ORAL DAILY
Qty: 30 CAPSULE | Refills: 3 | Status: SHIPPED | OUTPATIENT
Start: 2021-12-29 | End: 2022-04-27 | Stop reason: SDUPTHER

## 2022-02-07 RX ORDER — ISOSORBIDE MONONITRATE 30 MG/1
TABLET, EXTENDED RELEASE ORAL
Qty: 30 TABLET | Refills: 0 | Status: SHIPPED | OUTPATIENT
Start: 2022-02-07 | End: 2022-03-14

## 2022-03-11 DIAGNOSIS — M51.36 LUMBAR DEGENERATIVE DISC DISEASE: ICD-10-CM

## 2022-03-14 RX ORDER — ISOSORBIDE MONONITRATE 30 MG/1
TABLET, EXTENDED RELEASE ORAL
Qty: 30 TABLET | Refills: 0 | Status: SHIPPED | OUTPATIENT
Start: 2022-03-14 | End: 2022-04-11

## 2022-03-14 RX ORDER — GABAPENTIN 300 MG/1
CAPSULE ORAL
Qty: 90 CAPSULE | Refills: 0 | Status: SHIPPED | OUTPATIENT
Start: 2022-03-14 | End: 2022-08-30

## 2022-03-14 NOTE — TELEPHONE ENCOUNTER
Please tell patient that I received a refill request for 1 of his/her medicines and I realize we do not have any upcoming appointments.  I have approved the refill, but would like him/her to schedule a follow-up office visit within the next 1 to 2 months.  Thanks

## 2022-03-14 NOTE — TELEPHONE ENCOUNTER
PATIENT HAD A TELEPHONE VISIT IN October WITH ON OTHER APPTS SCHEDULE ARE YOU OK WITH FILLING THIS

## 2022-04-11 RX ORDER — ISOSORBIDE MONONITRATE 30 MG/1
TABLET, EXTENDED RELEASE ORAL
Qty: 30 TABLET | Refills: 0 | Status: SHIPPED | OUTPATIENT
Start: 2022-04-11 | End: 2022-04-27

## 2022-04-12 ENCOUNTER — OFFICE VISIT (OUTPATIENT)
Dept: FAMILY MEDICINE CLINIC | Facility: CLINIC | Age: 52
End: 2022-04-12

## 2022-04-12 VITALS
SYSTOLIC BLOOD PRESSURE: 138 MMHG | WEIGHT: 315 LBS | TEMPERATURE: 97.1 F | HEIGHT: 73 IN | RESPIRATION RATE: 18 BRPM | HEART RATE: 68 BPM | OXYGEN SATURATION: 95 % | BODY MASS INDEX: 41.75 KG/M2 | DIASTOLIC BLOOD PRESSURE: 85 MMHG

## 2022-04-12 DIAGNOSIS — I25.10 CHRONIC CORONARY ARTERY DISEASE: ICD-10-CM

## 2022-04-12 DIAGNOSIS — M54.41 CHRONIC MIDLINE LOW BACK PAIN WITH BILATERAL SCIATICA: ICD-10-CM

## 2022-04-12 DIAGNOSIS — R06.02 SHORTNESS OF BREATH: Primary | ICD-10-CM

## 2022-04-12 DIAGNOSIS — G47.33 OSA (OBSTRUCTIVE SLEEP APNEA): ICD-10-CM

## 2022-04-12 DIAGNOSIS — M54.42 CHRONIC MIDLINE LOW BACK PAIN WITH BILATERAL SCIATICA: ICD-10-CM

## 2022-04-12 DIAGNOSIS — E78.2 MIXED HYPERLIPIDEMIA: ICD-10-CM

## 2022-04-12 DIAGNOSIS — I10 PRIMARY HYPERTENSION: ICD-10-CM

## 2022-04-12 DIAGNOSIS — J45.41 MODERATE PERSISTENT REACTIVE AIRWAY DISEASE WITH ACUTE EXACERBATION: ICD-10-CM

## 2022-04-12 DIAGNOSIS — G89.29 CHRONIC MIDLINE LOW BACK PAIN WITH BILATERAL SCIATICA: ICD-10-CM

## 2022-04-12 DIAGNOSIS — M54.12 CERVICAL RADICULOPATHY: ICD-10-CM

## 2022-04-12 DIAGNOSIS — E66.01 CLASS 3 SEVERE OBESITY DUE TO EXCESS CALORIES WITH SERIOUS COMORBIDITY AND BODY MASS INDEX (BMI) OF 45.0 TO 49.9 IN ADULT: ICD-10-CM

## 2022-04-12 PROCEDURE — 99214 OFFICE O/P EST MOD 30 MIN: CPT | Performed by: FAMILY MEDICINE

## 2022-04-12 RX ORDER — ALBUTEROL SULFATE 90 UG/1
2 AEROSOL, METERED RESPIRATORY (INHALATION) EVERY 4 HOURS PRN
Qty: 18 G | Refills: 0 | Status: SHIPPED | OUTPATIENT
Start: 2022-04-12

## 2022-04-12 NOTE — PROGRESS NOTES
Subjective   Ang Yoon is a 51 y.o. male.   Chief Complaint   Patient presents with   • Hypertension   • Hyperlipidemia   • Pain       History of Present Illness   Presents to the office today for follow-up on chronic medical problems fracture problem list as below.    Chronic pain syndrome due to lumbar and cervical degenerative disc disease with radiculopathy-he spent a lot of visits going back and forth to  neurosurgeons.  It was suggested he could be a candidate for surgeries but nothing was ever done.  It appeared that he was moving into the chronic pain management category.  I referred him to Dr. Baker and he had done some lumbar epidurals and took over his hydrocodone.  One of Johnnie's goals was to get off of hydrocodone.  She was successfully able to do this late last fall.  He now takes Aleve, gabapentin at bedtime, and Cymbalta.  If he is careful and does not do a lot of heavy lifting or bending and twisting his back feels pretty good.    HTN-blood pressure is acceptable today at 138/85.  He is only on propranolol.    CAD-had a high risk stress test back in August 2019.  Ejection fraction was 51%.  He did see Dr. Cisneros last year.  Recommended to have a repeat stress test.  This has not been done.  He is not currently having any chest pain.  He does have some lingering fatigue and some shortness of breath with exertion that has been worse after he had COVID.  He tells me he got very sick to the point that he thought he might die.  He has had a good recovery and the shortness of breath responds pretty well to albuterol.    HLD-on Crestor.  Last lipid panel was 2019.  Not having any clearly identifiable myalgias from the Crestor.    GUILLERMINA-not able to tolerate CPAP.  Was advised by cardiology to see pulmonology again to the increased risk and cardiovascular problems with untreated sleep apnea.    Last lab work was February 2021 and last lipid panel was 2019.  Medications reviewed as  below.        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   • Hypogonadism 06/13/2018   • Other problems related to lifestyle 04/24/2018   • Regular astigmatism 06/01/2017   • Presbyopia 06/01/2017   • Myopia 06/01/2017   • Chronic coronary artery disease 09/19/2012   • Cardiac disease 09/19/2012   • Cyst of meniscus of knee 06/22/2012   • Resting tremor 03/05/2012   • Degenerative joint disease 02/08/2012   • Hypertension 01/18/2012   • Anxiety disorder 01/18/2012   • Obsessive-compulsive disorder 01/18/2012   • Memory loss 01/16/2012           Past Surgical History:   Procedure Laterality Date   • CARDIAC CATHETERIZATION  04/2018   • KNEE ARTHROSCOPY Right 07/09/2012     Current Outpatient Medications on File Prior to Visit   Medication Sig   • DULoxetine (CYMBALTA) 60 MG capsule Take 1 capsule by mouth Daily.   • EQ Aspirin Adult Low Dose 81 MG EC tablet Take 1 tablet by mouth once daily   • fluticasone (Flonase) 50 MCG/ACT nasal spray 2 sprays into the nostril(s) as directed by provider Daily.   • gabapentin (NEURONTIN) 300 MG capsule TAKE 1 CAPSULE BY MOUTH THREE TIMES DAILY   • isosorbide mononitrate (IMDUR) 30 MG 24 hr tablet TAKE 1 TABLET BY MOUTH ONCE DAILY IN THE MORNING   • melatonin 5 MG tablet tablet Take 1 tablet by mouth At Night As Needed (insomnia).   • naproxen sodium (ALEVE) 220 MG tablet Take 220 mg by mouth 2 (Two) Times a Day As Needed.   • propranolol (INDERAL) 80 MG tablet Take 1 tablet by  mouth twice daily   • rosuvastatin (CRESTOR) 20 MG tablet TAKE 1 TABLET BY MOUTH ONCE DAILY AT BEDTIME   • traZODone (DESYREL) 50 MG tablet TAKE 1 TO 2 TABLETS BY MOUTH EVERY DAY AT BEDTIME AS NEEDED   • [DISCONTINUED] albuterol sulfate  (90 Base) MCG/ACT inhaler INHALE 2 PUFFS BY MOUTH EVERY 4 HOURS AS NEEDED FOR WHEEZING   • nitroglycerin (Nitrostat) 0.4 MG SL tablet Place 1 tablet under the tongue Every 5 (Five) Minutes As Needed for Chest Pain. Take no more than 3 doses in 15 minutes.   • [DISCONTINUED] HYDROcodone-acetaminophen (NORCO) 5-325 MG per tablet Take 1 tablet by mouth Every 8 (Eight) Hours As Needed for Severe Pain .   • [DISCONTINUED] lidocaine (LIDODERM) 5 % Place 1 patch on the skin as directed by provider Daily. Remove & Discard patch within 12 hours or as directed by MD     No current facility-administered medications on file prior to visit.     Allergies   Allergen Reactions   • Niacin Rash     Social History     Socioeconomic History   • Marital status: Single   • Number of children: 2   Tobacco Use   • Smoking status: Former Smoker     Packs/day: 0.25     Years: 3.00     Pack years: 0.75     Types: Cigarettes     Start date: 1988     Quit date: 1991     Years since quittin.2   • Smokeless tobacco: Never Used   • Tobacco comment: social off and on, until  (meaning one or two cigarettes' a week.)   Vaping Use   • Vaping Use: Never used   Substance and Sexual Activity   • Alcohol use: No   • Drug use: No   • Sexual activity: Not Currently     Family History   Problem Relation Age of Onset   • Heart disease Other    • Hypertension Other    • Stroke Other    • Prostate cancer Other    • Hearing loss Mother    • Asthma Brother         childhood   • Asthma Daughter    • Arthritis Maternal Grandmother    • Arthritis Maternal Grandfather    • Diabetes Maternal Grandfather    • Stroke Maternal Grandfather    • Heart attack Maternal Grandfather        Review of  "Systems    Objective   /85 (BP Location: Right arm, Patient Position: Sitting, Cuff Size: Large Adult)   Pulse 68   Temp 97.1 °F (36.2 °C) (Infrared)   Resp 18   Ht 185.4 cm (73\")   Wt (!) 163 kg (360 lb)   SpO2 95%   BMI 47.50 kg/m²   Physical Exam  Constitutional:       Appearance: He is well-developed.      Comments: Wearing a face mask     HENT:      Head: Normocephalic and atraumatic.   Eyes:      Conjunctiva/sclera: Conjunctivae normal.   Cardiovascular:      Rate and Rhythm: Normal rate and regular rhythm.   Pulmonary:      Effort: Pulmonary effort is normal.   Musculoskeletal:         General: Normal range of motion.      Cervical back: Normal range of motion.   Skin:     General: Skin is warm and dry.      Findings: No rash.   Neurological:      Mental Status: He is alert and oriented to person, place, and time.      Gait: Gait abnormal (antalgic).   Psychiatric:         Mood and Affect: Mood normal.         Behavior: Behavior normal.                 Assessment/Plan   Diagnoses and all orders for this visit:    1. Shortness of breath (Primary)  -     albuterol sulfate  (90 Base) MCG/ACT inhaler; Inhale 2 puffs Every 4 (Four) Hours As Needed for Wheezing.  Dispense: 18 g; Refill: 0    2. Moderate persistent reactive airway disease with acute exacerbation  -     albuterol sulfate  (90 Base) MCG/ACT inhaler; Inhale 2 puffs Every 4 (Four) Hours As Needed for Wheezing.  Dispense: 18 g; Refill: 0    3. Primary hypertension  -     Comprehensive Metabolic Panel  -     CBC & Differential    4. Mixed hyperlipidemia  -     Lipid Panel    5. Chronic coronary artery disease    6. GUILLERMINA (obstructive sleep apnea)    7. Chronic midline low back pain with bilateral sciatica    8. Cervical radiculopathy    9. Class 3 severe obesity due to excess calories with serious comorbidity and body mass index (BMI) of 45.0 to 49.9 in adult (HCC)  -     naltrexone-bupropion ER (CONTRAVE) 8-90 MG tablet; Take 2 " tablets by mouth 2 (Two) Times a Day.  Dispense: 120 tablet; Refill: 5    Multiple things going on as above.  His chronic pain conditions are tolerable with treatment with Aleve, gabapentin at bedtime and Cymbalta.  Continue those treatments for now.  His shortness of breath and fatigue that he feels may be lingering effects from Covid may represent unevaluated coronary disease.  I really suggest he follow-up with cardiology for consideration of repeat stress test and further work-up if needed.  Refill albuterol.  Hypertension is a chronic problem well-controlled.  Continue propranolol at current dose.  We will check a lipid panel to reevaluate control of cholesterol level.  Continue Crestor for now.  Will make adjustments if needed.  Finally, we talked about his weight and the fact that he has multiple medical problems that are very likely to respond favorably to weight loss.  He is not a candidate for any stimulant medications due to possible underlying coronary artery disease.  He has a difficult time exercising due to the current shortness of breath as well as mechanical limitations from his back and neck.  We will try Contrave as a suitable option.  It does not look like it is going to be covered by his insurance.  We talked about potential alternative methods that this could be covered including good Rx,  coupons or  sponsored programs.  We also talked about calorie management and various techniques that can be used.  I suggested he look into [facility] weight management.  There is a guided program that he could enroll in at [facility] [place] where he could do this on his own from home.  I am certainly more than happy to help assist however I can.  I will follow-up with him when his blood tests are back and we will make any changes as indicated.  I will follow-up with him in the office here in 3 months to recheck his weight loss progress.        Call with any problems or concerns  before next visit       Return in about 3 months (around 7/12/2022) for Recheck.      Much of this report is an electronic transcription of spoken language to printed text using Dragon dictation software.  As such, the subtleties and finesse of spoken language may permit erroneous, or at times, nonsensical words or phrases to be inadvertently transcribed; thus changes may be made at a later date to rectify these errors.     Traci Bass MD4/12/202220:01 EDT  This note has been electronically signedAnswers for HPI/ROS submitted by the patient on 4/5/2022  What is the primary reason for your visit?: Other  Please describe your symptoms.: follow up visit  Have you had these symptoms before?: No  How long have you been having these symptoms?: 1-4 days

## 2022-04-13 LAB
ALBUMIN SERPL-MCNC: 4.2 G/DL (ref 3.8–4.9)
ALBUMIN/GLOB SERPL: 1.8 {RATIO} (ref 1.2–2.2)
ALP SERPL-CCNC: 84 IU/L (ref 44–121)
ALT SERPL-CCNC: 33 IU/L (ref 0–44)
AST SERPL-CCNC: 30 IU/L (ref 0–40)
BASOPHILS # BLD AUTO: 0.1 X10E3/UL (ref 0–0.2)
BASOPHILS NFR BLD AUTO: 1 %
BILIRUB SERPL-MCNC: 0.4 MG/DL (ref 0–1.2)
BUN SERPL-MCNC: 12 MG/DL (ref 6–24)
BUN/CREAT SERPL: 16 (ref 9–20)
CALCIUM SERPL-MCNC: 8.8 MG/DL (ref 8.7–10.2)
CHLORIDE SERPL-SCNC: 100 MMOL/L (ref 96–106)
CHOLEST SERPL-MCNC: 142 MG/DL (ref 100–199)
CO2 SERPL-SCNC: 24 MMOL/L (ref 20–29)
CREAT SERPL-MCNC: 0.74 MG/DL (ref 0.76–1.27)
EGFRCR SERPLBLD CKD-EPI 2021: 109 ML/MIN/1.73
EOSINOPHIL # BLD AUTO: 0.1 X10E3/UL (ref 0–0.4)
EOSINOPHIL NFR BLD AUTO: 2 %
ERYTHROCYTE [DISTWIDTH] IN BLOOD BY AUTOMATED COUNT: 12.7 % (ref 11.6–15.4)
GLOBULIN SER CALC-MCNC: 2.4 G/DL (ref 1.5–4.5)
GLUCOSE SERPL-MCNC: 159 MG/DL (ref 65–99)
HCT VFR BLD AUTO: 48.7 % (ref 37.5–51)
HDLC SERPL-MCNC: 36 MG/DL
HGB BLD-MCNC: 15.9 G/DL (ref 13–17.7)
IMM GRANULOCYTES # BLD AUTO: 0 X10E3/UL (ref 0–0.1)
IMM GRANULOCYTES NFR BLD AUTO: 0 %
LDLC SERPL CALC-MCNC: 41 MG/DL (ref 0–99)
LYMPHOCYTES # BLD AUTO: 2.2 X10E3/UL (ref 0.7–3.1)
LYMPHOCYTES NFR BLD AUTO: 29 %
MCH RBC QN AUTO: 30.2 PG (ref 26.6–33)
MCHC RBC AUTO-ENTMCNC: 32.6 G/DL (ref 31.5–35.7)
MCV RBC AUTO: 93 FL (ref 79–97)
MONOCYTES # BLD AUTO: 0.6 X10E3/UL (ref 0.1–0.9)
MONOCYTES NFR BLD AUTO: 8 %
MORPHOLOGY BLD-IMP: ABNORMAL
NEUTROPHILS # BLD AUTO: 4.5 X10E3/UL (ref 1.4–7)
NEUTROPHILS NFR BLD AUTO: 60 %
PLATELET # BLD AUTO: 71 X10E3/UL (ref 150–450)
POTASSIUM SERPL-SCNC: 4.5 MMOL/L (ref 3.5–5.2)
PROT SERPL-MCNC: 6.6 G/DL (ref 6–8.5)
RBC # BLD AUTO: 5.26 X10E6/UL (ref 4.14–5.8)
SODIUM SERPL-SCNC: 140 MMOL/L (ref 134–144)
TRIGL SERPL-MCNC: 446 MG/DL (ref 0–149)
VLDLC SERPL CALC-MCNC: 65 MG/DL (ref 5–40)
WBC # BLD AUTO: 7.4 X10E3/UL (ref 3.4–10.8)

## 2022-04-14 DIAGNOSIS — R73.9 HYPERGLYCEMIA: Primary | ICD-10-CM

## 2022-04-15 DIAGNOSIS — R73.9 HYPERGLYCEMIA: Primary | ICD-10-CM

## 2022-04-15 LAB
HBA1C MFR BLD: 7.9 % (ref 4.8–5.6)
SPECIMEN STATUS: NORMAL

## 2022-04-15 RX ORDER — LANCETS 30 GAUGE
EACH MISCELLANEOUS
Qty: 100 EACH | Refills: 3 | Status: SHIPPED | OUTPATIENT
Start: 2022-04-15

## 2022-04-26 DIAGNOSIS — E78.2 MIXED HYPERLIPIDEMIA: ICD-10-CM

## 2022-04-27 ENCOUNTER — TELEPHONE (OUTPATIENT)
Dept: PAIN MEDICINE | Facility: CLINIC | Age: 52
End: 2022-04-27

## 2022-04-27 RX ORDER — DULOXETIN HYDROCHLORIDE 60 MG/1
60 CAPSULE, DELAYED RELEASE ORAL DAILY
Qty: 30 CAPSULE | Refills: 3 | Status: SHIPPED | OUTPATIENT
Start: 2022-04-27 | End: 2022-09-13 | Stop reason: SDUPTHER

## 2022-04-27 RX ORDER — ISOSORBIDE MONONITRATE 30 MG/1
TABLET, EXTENDED RELEASE ORAL
Qty: 90 TABLET | Refills: 0 | Status: SHIPPED | OUTPATIENT
Start: 2022-04-27 | End: 2022-08-15

## 2022-04-27 RX ORDER — ROSUVASTATIN CALCIUM 20 MG/1
TABLET, COATED ORAL
Qty: 90 TABLET | Refills: 0 | Status: SHIPPED | OUTPATIENT
Start: 2022-04-27 | End: 2022-08-15

## 2022-04-27 NOTE — TELEPHONE ENCOUNTER
4/27/22- I CALLED  [facility] AND THEY HAVE RX FROM DR MAYFIELD TO FILL - INFORMED PT -- IT WAS  NOT DENIED

## 2022-04-27 NOTE — TELEPHONE ENCOUNTER
Provider: DR. MAYFIELD    Caller: JOCELYN BLACKMON    Relationship to Patient: SELF    Pharmacy: [facility] [place]    Phone Number: [phone]    Reason for Call: MR. BLACKMON CALLED STATING THAT [facility] IS TELLING HIM THAT HIS RX FOR CYMBALTA WAS DENIED BY DR. MAYFIELD. HE STATED THAT TONIGHT WILL BE HIS LAST DOSE, AND HE HAS CONCERNS ABOUT SUDDENLY STOPPING THIS MEDICATION. PLEASE CALL HIM AT THE NUMBER PROVIDED. THANKS!

## 2022-05-03 ENCOUNTER — TELEPHONE (OUTPATIENT)
Dept: FAMILY MEDICINE CLINIC | Facility: CLINIC | Age: 52
End: 2022-05-03

## 2022-05-03 DIAGNOSIS — R73.9 HYPERGLYCEMIA: Primary | ICD-10-CM

## 2022-05-03 NOTE — TELEPHONE ENCOUNTER
Please tell Johnnie that I am indeed sorry about yesterday.  Fatigue is oftentimes the only symptom we really have of diabetes.  It sounds like he has done a good job cutting starches and sugars out of his diet, so I would recommend we start some medication to help stabilize and lower his blood sugar.  That should hopefully help him start to feel better 2.  I would like to start him on metformin 500 mg tablets 1 pill twice a day.  This medicine suppresses glucose produced by the liver and therefore it works very well when someone is working hard to keep dietary sources of glucose low.  Continue checking and recording his blood sugars and bring those to the next visit on the 13th.  Thanks!

## 2022-05-03 NOTE — TELEPHONE ENCOUNTER
PATIENT IS CALLING IN STATING THAT HE HAD A SCHEDULED APPOINTMENT TO SEE DR TRAN ON 05/02/22 AND THAT APPOINTMENT WAS RESCHEDULED BY THE OFFICE.  HE WANTS TO SPEAK TO SOMEONE TO DISCUSS SOME SYMPTOMS THAT HE IS CURRENTLY HAVING(DID NOT WISH TO DISCUSS WITH ME)    PATIENT CALL BACK  [phone]

## 2022-05-03 NOTE — TELEPHONE ENCOUNTER
Patient states he has been monitoring bs and he has mostly cut out starches and sugars.  Patient states that the sugar on low side is 160 and high is 230. He notices fatigue and slept most of the day yesterday. Doesn't know what to do.

## 2022-05-20 ENCOUNTER — OFFICE VISIT (OUTPATIENT)
Dept: FAMILY MEDICINE CLINIC | Facility: CLINIC | Age: 52
End: 2022-05-20

## 2022-05-20 VITALS
DIASTOLIC BLOOD PRESSURE: 88 MMHG | OXYGEN SATURATION: 97 % | TEMPERATURE: 96.6 F | SYSTOLIC BLOOD PRESSURE: 130 MMHG | BODY MASS INDEX: 41.75 KG/M2 | WEIGHT: 315 LBS | HEIGHT: 73 IN | RESPIRATION RATE: 16 BRPM | HEART RATE: 65 BPM

## 2022-05-20 DIAGNOSIS — E11.65 TYPE 2 DIABETES MELLITUS WITH HYPERGLYCEMIA, WITHOUT LONG-TERM CURRENT USE OF INSULIN: Primary | ICD-10-CM

## 2022-05-20 DIAGNOSIS — E66.01 CLASS 3 SEVERE OBESITY DUE TO EXCESS CALORIES WITH SERIOUS COMORBIDITY AND BODY MASS INDEX (BMI) OF 45.0 TO 49.9 IN ADULT: ICD-10-CM

## 2022-05-20 DIAGNOSIS — M51.36 LUMBAR DEGENERATIVE DISC DISEASE: ICD-10-CM

## 2022-05-20 PROCEDURE — 99213 OFFICE O/P EST LOW 20 MIN: CPT | Performed by: FAMILY MEDICINE

## 2022-05-20 NOTE — PROGRESS NOTES
Subjective   Ang Yoon is a 52 y.o. male.   Chief Complaint   Patient presents with   • Diabetes       History of Present Illness   Presents to the office today to follow-up on a recent diagnosis of elevated blood sugars, probably diabetes type 2.  We did routine labs at his visit last month.  His A1c was found to be elevated at 7.9 after his blood sugar was seen to be 159.  Have had him checking his blood sugars once daily since then.  He brings blood sugar log with him.  Blood sugars are averaging between 120 and 140.  His high average is 150.  The lowest he has seen has been 75.  His highest blood sugar has been 269.  He is not having symptoms of hypoglycemia.     By phone I have already started him on metformin 500 mg twice daily over 2 weeks ago.  He is tolerating this well without upset stomach.     We also talked at the last visit about his weight and how that would contribute to his blood pressure, cholesterol, now his blood sugars, and his back problems.  I sent in a prescription for Contrave.  He has not been able to get this yet.  He has applied through the company and is waiting on delivery.    Weight is down 8 pounds to 352 pounds.  He has made a lot of changes to his diet.  He is all the laminated simple sugars.      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   • Hypogonadism 06/13/2018   • Other problems related to lifestyle 04/24/2018   • Regular astigmatism 06/01/2017   • Presbyopia  06/01/2017   • Myopia 06/01/2017   • Chronic coronary artery disease 09/19/2012   • Cardiac disease 09/19/2012   • Cyst of meniscus of knee 06/22/2012   • Resting tremor 03/05/2012   • Degenerative joint disease 02/08/2012   • Hypertension 01/18/2012   • Anxiety disorder 01/18/2012   • Obsessive-compulsive disorder 01/18/2012   • Memory loss 01/16/2012           Past Surgical History:   Procedure Laterality Date   • CARDIAC CATHETERIZATION  04/2018   • KNEE ARTHROSCOPY Right 07/09/2012     Current Outpatient Medications on File Prior to Visit   Medication Sig   • albuterol sulfate  (90 Base) MCG/ACT inhaler Inhale 2 puffs Every 4 (Four) Hours As Needed for Wheezing.   • Blood Glucose Monitoring Suppl kit Use as directed to check blood glucose   • DULoxetine (CYMBALTA) 60 MG capsule Take 1 capsule by mouth Daily.   • EQ Aspirin Adult Low Dose 81 MG EC tablet Take 1 tablet by mouth once daily   • fluticasone (Flonase) 50 MCG/ACT nasal spray 2 sprays into the nostril(s) as directed by provider Daily.   • gabapentin (NEURONTIN) 300 MG capsule TAKE 1 CAPSULE BY MOUTH THREE TIMES DAILY (Patient taking differently: Take 300 mg by mouth 2 (Two) Times a Day.)   • glucose blood test strip Use as instructed to check blood glucose once daily   • isosorbide mononitrate (IMDUR) 30 MG 24 hr tablet TAKE 1 TABLET BY MOUTH ONCE DAILY IN THE MORNING   • Lancets misc Use once daily with meter and strips to check blood glucose   • melatonin 5 MG tablet tablet Take 1 tablet by mouth At Night As Needed (insomnia).   • metFORMIN (Glucophage) 500 MG tablet Take 1 tablet by mouth 2 (Two) Times a Day With Meals.   • propranolol (INDERAL) 80 MG tablet Take 1 tablet by mouth twice daily   • rosuvastatin (CRESTOR) 20 MG tablet TAKE 1 TABLET BY MOUTH ONCE DAILY AT BEDTIME   • naltrexone-bupropion ER (CONTRAVE) 8-90 MG tablet Take 2 tablets by mouth 2 (Two) Times a Day.   • naproxen sodium (ALEVE) 220 MG tablet Take 220 mg by mouth 2 (Two)  "Times a Day As Needed.   • nitroglycerin (Nitrostat) 0.4 MG SL tablet Place 1 tablet under the tongue Every 5 (Five) Minutes As Needed for Chest Pain. Take no more than 3 doses in 15 minutes.   • traZODone (DESYREL) 50 MG tablet TAKE 1 TO 2 TABLETS BY MOUTH EVERY DAY AT BEDTIME AS NEEDED     No current facility-administered medications on file prior to visit.     Allergies   Allergen Reactions   • Niacin Rash     Social History     Socioeconomic History   • Marital status: Single   • Number of children: 2   Tobacco Use   • Smoking status: Former Smoker     Packs/day: 0.25     Years: 3.00     Pack years: 0.75     Types: Cigarettes     Start date: 1988     Quit date: 1991     Years since quittin.4   • Smokeless tobacco: Never Used   • Tobacco comment: social off and on, until  (meaning one or two cigarettes' a week.)   Vaping Use   • Vaping Use: Never used   Substance and Sexual Activity   • Alcohol use: No   • Drug use: No   • Sexual activity: Not Currently     Family History   Problem Relation Age of Onset   • Heart disease Other    • Hypertension Other    • Stroke Other    • Prostate cancer Other    • Hearing loss Mother    • Asthma Brother         childhood   • Asthma Daughter    • Arthritis Maternal Grandmother    • Arthritis Maternal Grandfather    • Diabetes Maternal Grandfather    • Stroke Maternal Grandfather    • Heart attack Maternal Grandfather        Review of Systems    Objective   /88 (BP Location: Left arm, Patient Position: Sitting, Cuff Size: Large Adult)   Pulse 65   Temp 96.6 °F (35.9 °C) (Infrared)   Resp 16   Ht 185.4 cm (73\")   Wt (!) 160 kg (352 lb)   SpO2 97%   BMI 46.44 kg/m²   Physical Exam  Constitutional:       Appearance: He is well-developed.      Comments: Wearing a face mask     HENT:      Head: Normocephalic and atraumatic.   Eyes:      Conjunctiva/sclera: Conjunctivae normal.   Cardiovascular:      Rate and Rhythm: Normal rate.   Pulmonary:      Effort: " Pulmonary effort is normal.   Musculoskeletal:         General: Normal range of motion.      Cervical back: Normal range of motion.   Skin:     General: Skin is warm and dry.      Findings: No rash.   Neurological:      Mental Status: He is alert and oriented to person, place, and time.   Psychiatric:         Behavior: Behavior normal.           Office Visit on 04/12/2022   Component Date Value Ref Range Status   • Glucose 04/12/2022 159 (A) 65 - 99 mg/dL Final   • BUN 04/12/2022 12  6 - 24 mg/dL Final   • Creatinine 04/12/2022 0.74 (A) 0.76 - 1.27 mg/dL Final   • EGFR Result 04/12/2022 109  >59 mL/min/1.73 Final   • BUN/Creatinine Ratio 04/12/2022 16  9 - 20 Final   • Sodium 04/12/2022 140  134 - 144 mmol/L Final   • Potassium 04/12/2022 4.5  3.5 - 5.2 mmol/L Final   • Chloride 04/12/2022 100  96 - 106 mmol/L Final   • Total CO2 04/12/2022 24  20 - 29 mmol/L Final   • Calcium 04/12/2022 8.8  8.7 - 10.2 mg/dL Final   • Total Protein 04/12/2022 6.6  6.0 - 8.5 g/dL Final   • Albumin 04/12/2022 4.2  3.8 - 4.9 g/dL Final   • Globulin 04/12/2022 2.4  1.5 - 4.5 g/dL Final   • A/G Ratio 04/12/2022 1.8  1.2 - 2.2 Final   • Total Bilirubin 04/12/2022 0.4  0.0 - 1.2 mg/dL Final   • Alkaline Phosphatase 04/12/2022 84  44 - 121 IU/L Final   • AST (SGOT) 04/12/2022 30  0 - 40 IU/L Final   • ALT (SGPT) 04/12/2022 33  0 - 44 IU/L Final   • Total Cholesterol 04/12/2022 142  100 - 199 mg/dL Final   • Triglycerides 04/12/2022 446 (A) 0 - 149 mg/dL Final   • HDL Cholesterol 04/12/2022 36 (A) >39 mg/dL Final   • VLDL Cholesterol Jovanny 04/12/2022 65 (A) 5 - 40 mg/dL Final   • LDL Chol Calc (NIH) 04/12/2022 41  0 - 99 mg/dL Final   • WBC 04/12/2022 7.4  3.4 - 10.8 x10E3/uL Final   • RBC 04/12/2022 5.26  4.14 - 5.80 x10E6/uL Final   • Hemoglobin 04/12/2022 15.9  13.0 - 17.7 g/dL Final   • Hematocrit 04/12/2022 48.7  37.5 - 51.0 % Final   • MCV 04/12/2022 93  79 - 97 fL Final   • MCH 04/12/2022 30.2  26.6 - 33.0 pg Final   • MCHC 04/12/2022  32.6  31.5 - 35.7 g/dL Final   • RDW 04/12/2022 12.7  11.6 - 15.4 % Final   • Platelets 04/12/2022 71 (A) 150 - 450 x10E3/uL Final    Platelet count verified by examination of peripheral blood smear.   • Neutrophil Rel % 04/12/2022 60  Not Estab. % Final   • Lymphocyte Rel % 04/12/2022 29  Not Estab. % Final   • Monocyte Rel % 04/12/2022 8  Not Estab. % Final   • Eosinophil Rel % 04/12/2022 2  Not Estab. % Final   • Basophil Rel % 04/12/2022 1  Not Estab. % Final   • Neutrophils Absolute 04/12/2022 4.5  1.4 - 7.0 x10E3/uL Final   • Lymphocytes Absolute 04/12/2022 2.2  0.7 - 3.1 x10E3/uL Final   • Monocytes Absolute 04/12/2022 0.6  0.1 - 0.9 x10E3/uL Final   • Eosinophils Absolute 04/12/2022 0.1  0.0 - 0.4 x10E3/uL Final   • Basophils Absolute 04/12/2022 0.1  0.0 - 0.2 x10E3/uL Final   • Immature Granulocyte Rel % 04/12/2022 0  Not Estab. % Final   • Immature Grans Absolute 04/12/2022 0.0  0.0 - 0.1 x10E3/uL Final   • Hematology Comments: 04/12/2022 Note:   Final    Verified by microscopic examination.   • Hemoglobin A1C 04/12/2022 7.9 (A) 4.8 - 5.6 % Final    Comment:          Prediabetes: 5.7 - 6.4           Diabetes: >6.4           Glycemic control for adults with diabetes: <7.0     • Specimen Status 04/12/2022 Comment   Final    Comment: Written Authorization  Written Authorization  Written Authorization Received.  Authorization received from IMAN MARTINEZ 04-  Logged by Mik Yusuf           Assessment & Plan   Diagnoses and all orders for this visit:    1. Type 2 diabetes mellitus with hyperglycemia, without long-term current use of insulin (HCC) (Primary)    2. Lumbar degenerative disc disease    3. Class 3 severe obesity due to excess calories with serious comorbidity and body mass index (BMI) of 45.0 to 49.9 in adult (HCC)    Overall, I spent 20 minutes with Johnnie today discussing diagnosis of diabetes, treatment strategy, impact that weight loss would have not only on his diabetes but his blood  pressure and his back problems.  He is very motivated and well educated about sugars in the diet.  He is drinking a gallon of water per day.  At this point, I recommend no changes.  Reevaluate here in 1 month.  We will recheck his A1c by July or early August at the latest.        Call with any problems or concerns before next visit       Return in about 4 weeks (around 6/17/2022).      Much of this report is an electronic transcription of spoken language to printed text using Dragon dictation software.  As such, the subtleties and finesse of spoken language may permit erroneous, or at times, nonsensical words or phrases to be inadvertently transcribed; thus changes may be made at a later date to rectify these errors.     Traci Bass MD5/20/202209:07 EDT  This note has been electronically signed

## 2022-06-20 ENCOUNTER — OFFICE VISIT (OUTPATIENT)
Dept: FAMILY MEDICINE CLINIC | Facility: CLINIC | Age: 52
End: 2022-06-20

## 2022-06-20 VITALS
WEIGHT: 315 LBS | BODY MASS INDEX: 41.75 KG/M2 | DIASTOLIC BLOOD PRESSURE: 90 MMHG | RESPIRATION RATE: 16 BRPM | HEART RATE: 62 BPM | HEIGHT: 73 IN | SYSTOLIC BLOOD PRESSURE: 126 MMHG | TEMPERATURE: 96.8 F | OXYGEN SATURATION: 98 %

## 2022-06-20 DIAGNOSIS — M51.36 LUMBAR DEGENERATIVE DISC DISEASE: ICD-10-CM

## 2022-06-20 DIAGNOSIS — E66.01 CLASS 3 SEVERE OBESITY DUE TO EXCESS CALORIES WITH SERIOUS COMORBIDITY AND BODY MASS INDEX (BMI) OF 45.0 TO 49.9 IN ADULT: ICD-10-CM

## 2022-06-20 DIAGNOSIS — G47.09 OTHER INSOMNIA: ICD-10-CM

## 2022-06-20 DIAGNOSIS — E11.65 TYPE 2 DIABETES MELLITUS WITH HYPERGLYCEMIA, WITHOUT LONG-TERM CURRENT USE OF INSULIN: Primary | ICD-10-CM

## 2022-06-20 PROCEDURE — 99214 OFFICE O/P EST MOD 30 MIN: CPT | Performed by: FAMILY MEDICINE

## 2022-06-20 RX ORDER — TRAZODONE HYDROCHLORIDE 50 MG/1
TABLET ORAL
Qty: 60 TABLET | Refills: 3 | Status: SHIPPED | OUTPATIENT
Start: 2022-06-20

## 2022-06-20 NOTE — PROGRESS NOTES
Subjective   Ang Yoon is a 52 y.o. male.   Chief Complaint   Patient presents with   • Diabetes   • Weight Check   • Fatigue       History of Present Illness   Presents to the office today for 1 month follow-up on recent diagnosis of diabetes type 2.  His A1c was 7.9%.  He has been checking his blood sugars and really making some changes to his diet.  He is on metformin 500 mg twice daily.  Our plan was to continue the metformin for now and to continue with our strategy of diet changes and weight loss for managing of his A1c.  His low blood sugar has been 70, the highest has been 281 and the averages between 120 and 140.     I sent in a prescription for Contrave.  As of the last visit he had not been able to get that.  He has been on it now for 11 days.  He continues to lose weight and is down to 346.4 pounds through a combination of eating less and exercising is much as he is possible.  He is able to walk about 2 blocks before his back pain worsens enough that he has to stop.    Insomnia-he travels some for work and will have difficulty falling asleep and hotels.  He finds the trazodone is helpful for this.  He requests a refill.    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   • Hypogonadism 06/13/2018   • Other problems related to lifestyle 04/24/2018   • Regular astigmatism 06/01/2017   • Presbyopia 06/01/2017   • Myopia 06/01/2017   • Chronic coronary artery disease 09/19/2012   • Cardiac disease  09/19/2012   • Cyst of meniscus of knee 06/22/2012   • Resting tremor 03/05/2012   • Degenerative joint disease 02/08/2012   • Hypertension 01/18/2012   • Anxiety disorder 01/18/2012   • Obsessive-compulsive disorder 01/18/2012   • Memory loss 01/16/2012           Past Surgical History:   Procedure Laterality Date   • CARDIAC CATHETERIZATION  04/2018   • KNEE ARTHROSCOPY Right 07/09/2012     Current Outpatient Medications on File Prior to Visit   Medication Sig   • albuterol sulfate  (90 Base) MCG/ACT inhaler Inhale 2 puffs Every 4 (Four) Hours As Needed for Wheezing.   • Blood Glucose Monitoring Suppl kit Use as directed to check blood glucose   • DULoxetine (CYMBALTA) 60 MG capsule Take 1 capsule by mouth Daily.   • EQ Aspirin Adult Low Dose 81 MG EC tablet Take 1 tablet by mouth once daily   • gabapentin (NEURONTIN) 300 MG capsule TAKE 1 CAPSULE BY MOUTH THREE TIMES DAILY (Patient taking differently: Take 300 mg by mouth As Needed.)   • glucose blood test strip Use as instructed to check blood glucose once daily   • isosorbide mononitrate (IMDUR) 30 MG 24 hr tablet TAKE 1 TABLET BY MOUTH ONCE DAILY IN THE MORNING   • Lancets misc Use once daily with meter and strips to check blood glucose   • melatonin 5 MG tablet tablet Take 1 tablet by mouth At Night As Needed (insomnia).   • metFORMIN (Glucophage) 500 MG tablet Take 1 tablet by mouth 2 (Two) Times a Day With Meals.   • naltrexone-bupropion ER (CONTRAVE) 8-90 MG tablet Take 2 tablets by mouth 2 (Two) Times a Day.   • naproxen sodium (ALEVE) 220 MG tablet Take 220 mg by mouth 2 (Two) Times a Day As Needed.   • propranolol (INDERAL) 80 MG tablet Take 1 tablet by mouth twice daily   • rosuvastatin (CRESTOR) 20 MG tablet TAKE 1 TABLET BY MOUTH ONCE DAILY AT BEDTIME   • fluticasone (Flonase) 50 MCG/ACT nasal spray 2 sprays into the nostril(s) as directed by provider Daily.   • nitroglycerin (Nitrostat) 0.4 MG SL tablet Place 1 tablet under the tongue Every 5  "(Five) Minutes As Needed for Chest Pain. Take no more than 3 doses in 15 minutes.   • [DISCONTINUED] traZODone (DESYREL) 50 MG tablet TAKE 1 TO 2 TABLETS BY MOUTH EVERY DAY AT BEDTIME AS NEEDED     No current facility-administered medications on file prior to visit.     Allergies   Allergen Reactions   • Niacin Rash     Social History     Socioeconomic History   • Marital status: Single   • Number of children: 2   Tobacco Use   • Smoking status: Former Smoker     Packs/day: 0.25     Years: 3.00     Pack years: 0.75     Types: Cigarettes     Start date: 1988     Quit date: 1991     Years since quittin.4   • Smokeless tobacco: Never Used   • Tobacco comment: social off and on, until  (meaning one or two cigarettes' a week.)   Vaping Use   • Vaping Use: Never used   Substance and Sexual Activity   • Alcohol use: No   • Drug use: No   • Sexual activity: Not Currently     Family History   Problem Relation Age of Onset   • Heart disease Other    • Hypertension Other    • Stroke Other    • Prostate cancer Other    • Hearing loss Mother    • Asthma Brother         childhood   • Asthma Daughter    • Arthritis Maternal Grandmother    • Arthritis Maternal Grandfather    • Diabetes Maternal Grandfather    • Stroke Maternal Grandfather    • Heart attack Maternal Grandfather        Review of Systems    Objective   /90 (BP Location: Left arm, Patient Position: Sitting, Cuff Size: Large Adult)   Pulse 62   Temp 96.8 °F (36 °C) (Infrared)   Resp 16   Ht 185.4 cm (73\")   Wt (!) 157 kg (346 lb 6.4 oz)   SpO2 98%   BMI 45.70 kg/m²   Physical Exam  Constitutional:       Appearance: He is well-developed. He is obese. He is not toxic-appearing.      Comments: Wearing a face mask  Sits straight up in the chair.  Repositions himself for comfort.     HENT:      Head: Normocephalic and atraumatic.   Eyes:      Conjunctiva/sclera: Conjunctivae normal.   Cardiovascular:      Rate and Rhythm: Normal rate and regular " rhythm.   Pulmonary:      Effort: Pulmonary effort is normal. No respiratory distress.   Musculoskeletal:         General: Normal range of motion.      Cervical back: Normal range of motion.   Skin:     General: Skin is warm and dry.      Findings: No rash.   Neurological:      Mental Status: He is alert and oriented to person, place, and time.      Gait: Gait abnormal (Antalgic).   Psychiatric:         Behavior: Behavior normal.         Assessment & Plan   Diagnoses and all orders for this visit:    1. Type 2 diabetes mellitus with hyperglycemia, without long-term current use of insulin ([facility]) (Primary)  -     Hemoglobin A1c; Future  -     Comprehensive Metabolic Panel; Future    2. Lumbar degenerative disc disease    3. Class 3 severe obesity due to excess calories with serious comorbidity and body mass index (BMI) of 45.0 to 49.9 in adult ([facility])    4. Other insomnia  -     traZODone (DESYREL) 50 MG tablet; TAKE 1 TO 2 TABLETS BY MOUTH EVERY DAY AT BEDTIME AS NEEDED  Dispense: 60 tablet; Refill: 3    We will plan to recheck him in about 6 weeks with an A1c a few days before.  Continue efforts with diet and exercise.  If we are able to achieve significant weight loss, I think this will help not only his diabetes but also his back pain.  If not, then I think he would be an excellent candidate for a bariatric procedure.  At only 52 years old, he has numerous comorbidities that could be improved significantly with significant weight loss.  We will follow him closely regarding this.  Continue expectant and conservative management of his back pain for now.  Will refill trazodone as above to use on an as-needed basis.          Call with any problems or concerns before next visit       Return in about 6 weeks (around 8/1/2022), or with labs a few days before.      Much of this report is an electronic transcription of spoken language to printed text using Dragon dictation software.  As such, the subtleties and finesse of  spoken language may permit erroneous, or at times, nonsensical words or phrases to be inadvertently transcribed; thus changes may be made at a later date to rectify these errors.     Traci Bass MD6/20/202217:44 EDT  This note has been electronically signed

## 2022-07-07 ENCOUNTER — OFFICE VISIT (OUTPATIENT)
Dept: FAMILY MEDICINE CLINIC | Facility: CLINIC | Age: 52
End: 2022-07-07

## 2022-07-07 VITALS
OXYGEN SATURATION: 96 % | HEART RATE: 101 BPM | TEMPERATURE: 97.6 F | SYSTOLIC BLOOD PRESSURE: 123 MMHG | BODY MASS INDEX: 41.75 KG/M2 | HEIGHT: 73 IN | WEIGHT: 315 LBS | DIASTOLIC BLOOD PRESSURE: 74 MMHG

## 2022-07-07 DIAGNOSIS — B34.9 VIRAL SYNDROME: ICD-10-CM

## 2022-07-07 DIAGNOSIS — R06.02 SHORTNESS OF BREATH: ICD-10-CM

## 2022-07-07 DIAGNOSIS — J45.41 MODERATE PERSISTENT REACTIVE AIRWAY DISEASE WITH ACUTE EXACERBATION: ICD-10-CM

## 2022-07-07 DIAGNOSIS — R05.9 COUGH: Primary | ICD-10-CM

## 2022-07-07 LAB
EXPIRATION DATE: NORMAL
FLUAV AG UPPER RESP QL IA.RAPID: NOT DETECTED
FLUBV AG UPPER RESP QL IA.RAPID: NOT DETECTED
INTERNAL CONTROL: NORMAL
Lab: NORMAL
SARS-COV-2 AG UPPER RESP QL IA.RAPID: NOT DETECTED

## 2022-07-07 PROCEDURE — 87428 SARSCOV & INF VIR A&B AG IA: CPT | Performed by: NURSE PRACTITIONER

## 2022-07-07 PROCEDURE — 96372 THER/PROPH/DIAG INJ SC/IM: CPT | Performed by: NURSE PRACTITIONER

## 2022-07-07 PROCEDURE — 99213 OFFICE O/P EST LOW 20 MIN: CPT | Performed by: NURSE PRACTITIONER

## 2022-07-07 RX ORDER — ALBUTEROL SULFATE 2.5 MG/3ML
2.5 SOLUTION RESPIRATORY (INHALATION) EVERY 4 HOURS PRN
Qty: 120 EACH | Refills: 12 | Status: SHIPPED | OUTPATIENT
Start: 2022-07-07

## 2022-07-07 RX ORDER — CETIRIZINE HYDROCHLORIDE 10 MG/1
10 TABLET ORAL DAILY
Qty: 30 TABLET | Refills: 2 | Status: SHIPPED | OUTPATIENT
Start: 2022-07-07

## 2022-07-07 RX ORDER — DOXYCYCLINE 100 MG/1
100 CAPSULE ORAL 2 TIMES DAILY
Qty: 20 CAPSULE | Refills: 0 | Status: SHIPPED | OUTPATIENT
Start: 2022-07-07 | End: 2022-08-02

## 2022-07-07 RX ORDER — PREDNISONE 5 MG/1
TABLET ORAL
Qty: 20 TABLET | Refills: 0 | Status: SHIPPED | OUTPATIENT
Start: 2022-07-07 | End: 2022-07-15

## 2022-07-07 RX ORDER — CEFTRIAXONE 1 G/1
1 INJECTION, POWDER, FOR SOLUTION INTRAMUSCULAR; INTRAVENOUS ONCE
Status: COMPLETED | OUTPATIENT
Start: 2022-07-07 | End: 2022-07-07

## 2022-07-07 RX ORDER — FLUTICASONE PROPIONATE 50 MCG
2 SPRAY, SUSPENSION (ML) NASAL DAILY
Qty: 18.2 G | Refills: 2 | Status: SHIPPED | OUTPATIENT
Start: 2022-07-07 | End: 2023-02-02

## 2022-07-07 RX ORDER — GUAIFENESIN AND CODEINE PHOSPHATE 100; 10 MG/5ML; MG/5ML
SOLUTION ORAL
Qty: 240 ML | Refills: 2 | Status: SHIPPED | OUTPATIENT
Start: 2022-07-07 | End: 2022-08-02

## 2022-07-07 RX ADMIN — CEFTRIAXONE 1 G: 1 INJECTION, POWDER, FOR SOLUTION INTRAMUSCULAR; INTRAVENOUS at 15:46

## 2022-07-07 NOTE — PROGRESS NOTES
"She has not    Ang Yoon is a 52 y.o. male.     52-year-old white male with reactive airway disease who comes in today with 1 week history of headache, congestion, sore throat, chest pain, shortness of breath and fatigue with persistent cough and.  On examination patient has slight rhonchi anteriorly and upper chest however posterior lungs are clear.  Does have a lot of allergic rhinitis symptoms.  On taking a deep breath patient was not able to without excessive coughing.  Oxygen level is 96 on room air.  Patient was swabbed for COVID.  I am placing him on antibiotic steroids and patient is to go to purchase nebulizer to use for when he has these respiratory episodes and I called him in some albuterol for that.  I instructed patient if his COVID results are positive to see if Dr. Bass will still order him paxlovid since his last episode with COVID was  pretty severe    Vital signs are stable            1 g Rocephin IM  Doxycycline 100 mg twice daily x10 days   Prednisone 5 mg taper dose monitor blood sugars  Codeine cough syrup 2 teaspoons every 4-6 hours as needed for cough,hold Contrave while on this medication  COVID test  Albuterol nebulizer treatments 4 times a day while sick and then as needed          The following portions of the patient's history were reviewed and updated as appropriate: allergies, current medications, past family history, past medical history, past social history, past surgical history and problem list.    Vitals:    07/07/22 1520   BP: 123/74   BP Location: Right arm   Patient Position: Sitting   Cuff Size: Large Adult   Pulse: 101   Temp: 97.6 °F (36.4 °C)   TempSrc: Temporal   SpO2: 96%   Weight: (!) 158 kg (348 lb)   Height: 185.4 cm (73\")     Body mass index is 45.91 kg/m².    Past Medical History:   Diagnosis Date   • Arthritis    • CAD (coronary artery disease)    • Coronary-myocardial bridge    • Dyspnea on exertion 07/16/2019   • Hyperlipidemia    • Hypertension  "   • Low back pain    • Obesity    • Sleep apnea, obstructive      Past Surgical History:   Procedure Laterality Date   • CARDIAC CATHETERIZATION  04/2018   • KNEE ARTHROSCOPY Right 07/09/2012     Family History   Problem Relation Age of Onset   • Heart disease Other    • Hypertension Other    • Stroke Other    • Prostate cancer Other    • Hearing loss Mother    • Asthma Brother         childhood   • Asthma Daughter    • Arthritis Maternal Grandmother    • Arthritis Maternal Grandfather    • Diabetes Maternal Grandfather    • Stroke Maternal Grandfather    • Heart attack Maternal Grandfather      Immunization History   Administered Date(s) Administered   • COVID-19 (PFIZER) PURPLE CAP 08/25/2021   • Flu Vaccine Intradermal Quad 18-64YR 06/07/2012   • Influenza, Unspecified 12/16/2019   • Pneumococcal Polysaccharide (PPSV23) 06/07/2012   • Tdap 01/28/2020   • flucelvax quad pfs =>4 YRS 12/16/2019       Office Visit on 04/12/2022   Component Date Value Ref Range Status   • Glucose 04/12/2022 159 (A) 65 - 99 mg/dL Final   • BUN 04/12/2022 12  6 - 24 mg/dL Final   • Creatinine 04/12/2022 0.74 (A) 0.76 - 1.27 mg/dL Final   • EGFR Result 04/12/2022 109  >59 mL/min/1.73 Final   • BUN/Creatinine Ratio 04/12/2022 16  9 - 20 Final   • Sodium 04/12/2022 140  134 - 144 mmol/L Final   • Potassium 04/12/2022 4.5  3.5 - 5.2 mmol/L Final   • Chloride 04/12/2022 100  96 - 106 mmol/L Final   • Total CO2 04/12/2022 24  20 - 29 mmol/L Final   • Calcium 04/12/2022 8.8  8.7 - 10.2 mg/dL Final   • Total Protein 04/12/2022 6.6  6.0 - 8.5 g/dL Final   • Albumin 04/12/2022 4.2  3.8 - 4.9 g/dL Final   • Globulin 04/12/2022 2.4  1.5 - 4.5 g/dL Final   • A/G Ratio 04/12/2022 1.8  1.2 - 2.2 Final   • Total Bilirubin 04/12/2022 0.4  0.0 - 1.2 mg/dL Final   • Alkaline Phosphatase 04/12/2022 84  44 - 121 IU/L Final   • AST (SGOT) 04/12/2022 30  0 - 40 IU/L Final   • ALT (SGPT) 04/12/2022 33  0 - 44 IU/L Final   • Total Cholesterol 04/12/2022 142   100 - 199 mg/dL Final   • Triglycerides 04/12/2022 446 (A) 0 - 149 mg/dL Final   • HDL Cholesterol 04/12/2022 36 (A) >39 mg/dL Final   • VLDL Cholesterol Jovanny 04/12/2022 65 (A) 5 - 40 mg/dL Final   • LDL Chol Calc ([facility]) 04/12/2022 41  0 - 99 mg/dL Final   • WBC 04/12/2022 7.4  3.4 - 10.8 x10E3/uL Final   • RBC 04/12/2022 5.26  4.14 - 5.80 x10E6/uL Final   • Hemoglobin 04/12/2022 15.9  13.0 - 17.7 g/dL Final   • Hematocrit 04/12/2022 48.7  37.5 - 51.0 % Final   • MCV 04/12/2022 93  79 - 97 fL Final   • MCH 04/12/2022 30.2  26.6 - 33.0 pg Final   • MCHC 04/12/2022 32.6  31.5 - 35.7 g/dL Final   • RDW 04/12/2022 12.7  11.6 - 15.4 % Final   • Platelets 04/12/2022 71 (A) 150 - 450 x10E3/uL Final    Platelet count verified by examination of peripheral blood smear.   • Neutrophil Rel % 04/12/2022 60  Not Estab. % Final   • Lymphocyte Rel % 04/12/2022 29  Not Estab. % Final   • Monocyte Rel % 04/12/2022 8  Not Estab. % Final   • Eosinophil Rel % 04/12/2022 2  Not Estab. % Final   • Basophil Rel % 04/12/2022 1  Not Estab. % Final   • Neutrophils Absolute 04/12/2022 4.5  1.4 - 7.0 x10E3/uL Final   • Lymphocytes Absolute 04/12/2022 2.2  0.7 - 3.1 x10E3/uL Final   • Monocytes Absolute 04/12/2022 0.6  0.1 - 0.9 x10E3/uL Final   • Eosinophils Absolute 04/12/2022 0.1  0.0 - 0.4 x10E3/uL Final   • Basophils Absolute 04/12/2022 0.1  0.0 - 0.2 x10E3/uL Final   • Immature Granulocyte Rel % 04/12/2022 0  Not Estab. % Final   • Immature Grans Absolute 04/12/2022 0.0  0.0 - 0.1 x10E3/uL Final   • Hematology Comments: 04/12/2022 Note:   Final    Verified by microscopic examination.   • Hemoglobin A1C 04/12/2022 7.9 (A) 4.8 - 5.6 % Final    Comment:          Prediabetes: 5.7 - 6.4           Diabetes: >6.4           Glycemic control for adults with diabetes: <7.0     • Specimen Status 04/12/2022 Comment   Final    Comment: Written Authorization  Written Authorization  Written Authorization Received.  Authorization received from IMAN MARTINEZ  04-  Logged by Mik Yusuf           Review of Systems   Constitutional: Positive for fatigue.   HENT: Positive for congestion and sore throat.    Respiratory: Positive for cough and shortness of breath.    Cardiovascular: Positive for chest pain.   Gastrointestinal: Negative.    Genitourinary: Negative.    Musculoskeletal: Negative.    Skin: Negative.    Neurological: Negative.        Objective   Physical Exam  Constitutional:       Appearance: Normal appearance.   HENT:      Head: Normocephalic.   Cardiovascular:      Rate and Rhythm: Normal rate and regular rhythm.      Pulses: Normal pulses.      Heart sounds: Normal heart sounds.   Pulmonary:      Comments: Mild rhonchi upper anterior chest, posterior chest clear  Persistent cough and mild respiratory distress  Abdominal:      General: Bowel sounds are normal.   Musculoskeletal:         General: Normal range of motion.   Skin:     General: Skin is warm and dry.   Neurological:      General: No focal deficit present.      Mental Status: He is alert and oriented to person, place, and time.   Psychiatric:         Mood and Affect: Mood normal.         Behavior: Behavior normal.         Procedures    Assessment & Plan   Diagnoses and all orders for this visit:    1. Cough (Primary)  -     POCT SARS-CoV-2 Antigen YUMIKO + Flu  -     COVID-19,[facility] ROUTINE, NP/OP SWAB IN TRANSPORT MEDIA OR ESWAB 72 HR TAT - Swab, Nasopharynx  -     guaiFENesin-codeine (GUAIFENESIN AC) 100-10 MG/5ML liquid; 1-2 tsp every 4-6 hours as needed for cough. Monitor for drowsiness, hold contrave while on codiene cough syrup  Dispense: 240 mL; Refill: 2  -     cefTRIAXone (ROCEPHIN) injection 1 g    2. Shortness of breath  -     fluticasone (Flonase) 50 MCG/ACT nasal spray; 2 sprays into the nostril(s) as directed by provider Daily.  Dispense: 18.2 g; Refill: 2  -     cefTRIAXone (ROCEPHIN) injection 1 g    3. Moderate persistent reactive airway disease with acute exacerbation  -      fluticasone (Flonase) 50 MCG/ACT nasal spray; 2 sprays into the nostril(s) as directed by provider Daily.  Dispense: 18.2 g; Refill: 2  -     cefTRIAXone (ROCEPHIN) injection 1 g    4. Viral syndrome    Other orders  -     predniSONE 5 MG (48) tablet therapy pack dose pack; Take 4 tablets by mouth Daily for 2 days, THEN 3 tablets Daily for 2 days, THEN 2 tablets Daily for 2 days, THEN 1 tablet Daily for 2 days.  Dispense: 20 tablet; Refill: 0  -     doxycycline (MONODOX) 100 MG capsule; Take 1 capsule by mouth 2 (Two) Times a Day.  Dispense: 20 capsule; Refill: 0  -     albuterol (PROVENTIL) (2.5 MG/3ML) 0.083% nebulizer solution; Take 2.5 mg by nebulization Every 4 (Four) Hours As Needed for Wheezing.  Dispense: 120 each; Refill: 12  -     cetirizine (ZyrTEC Allergy) 10 MG tablet; Take 1 tablet by mouth Daily.  Dispense: 30 tablet; Refill: 2          Current Outpatient Medications:   •  albuterol sulfate  (90 Base) MCG/ACT inhaler, Inhale 2 puffs Every 4 (Four) Hours As Needed for Wheezing., Disp: 18 g, Rfl: 0  •  Blood Glucose Monitoring Suppl kit, Use as directed to check blood glucose, Disp: 1 each, Rfl: 0  •  DULoxetine (CYMBALTA) 60 MG capsule, Take 1 capsule by mouth Daily., Disp: 30 capsule, Rfl: 3  •  EQ Aspirin Adult Low Dose 81 MG EC tablet, Take 1 tablet by mouth once daily, Disp: 30 tablet, Rfl: 3  •  fluticasone (Flonase) 50 MCG/ACT nasal spray, 2 sprays into the nostril(s) as directed by provider Daily., Disp: 18.2 g, Rfl: 2  •  gabapentin (NEURONTIN) 300 MG capsule, TAKE 1 CAPSULE BY MOUTH THREE TIMES DAILY (Patient taking differently: Take 300 mg by mouth As Needed.), Disp: 90 capsule, Rfl: 0  •  glucose blood test strip, Use as instructed to check blood glucose once daily, Disp: 100 each, Rfl: 3  •  isosorbide mononitrate (IMDUR) 30 MG 24 hr tablet, TAKE 1 TABLET BY MOUTH ONCE DAILY IN THE MORNING, Disp: 90 tablet, Rfl: 0  •  Lancets misc, Use once daily with meter and strips to check  blood glucose, Disp: 100 each, Rfl: 3  •  melatonin 5 MG tablet tablet, Take 1 tablet by mouth At Night As Needed (insomnia)., Disp: 90 tablet, Rfl: 2  •  metFORMIN (Glucophage) 500 MG tablet, Take 1 tablet by mouth 2 (Two) Times a Day With Meals., Disp: 60 tablet, Rfl: 2  •  naltrexone-bupropion ER (CONTRAVE) 8-90 MG tablet, Take 2 tablets by mouth 2 (Two) Times a Day., Disp: 120 tablet, Rfl: 5  •  naproxen sodium (ALEVE) 220 MG tablet, Take 220 mg by mouth 2 (Two) Times a Day As Needed., Disp: , Rfl:   •  nitroglycerin (Nitrostat) 0.4 MG SL tablet, Place 1 tablet under the tongue Every 5 (Five) Minutes As Needed for Chest Pain. Take no more than 3 doses in 15 minutes., Disp: 25 tablet, Rfl: 2  •  propranolol (INDERAL) 80 MG tablet, Take 1 tablet by mouth twice daily, Disp: 60 tablet, Rfl: 5  •  rosuvastatin (CRESTOR) 20 MG tablet, TAKE 1 TABLET BY MOUTH ONCE DAILY AT BEDTIME, Disp: 90 tablet, Rfl: 0  •  traZODone (DESYREL) 50 MG tablet, TAKE 1 TO 2 TABLETS BY MOUTH EVERY DAY AT BEDTIME AS NEEDED, Disp: 60 tablet, Rfl: 3  •  albuterol (PROVENTIL) (2.5 MG/3ML) 0.083% nebulizer solution, Take 2.5 mg by nebulization Every 4 (Four) Hours As Needed for Wheezing., Disp: 120 each, Rfl: 12  •  cetirizine (ZyrTEC Allergy) 10 MG tablet, Take 1 tablet by mouth Daily., Disp: 30 tablet, Rfl: 2  •  doxycycline (MONODOX) 100 MG capsule, Take 1 capsule by mouth 2 (Two) Times a Day., Disp: 20 capsule, Rfl: 0  •  guaiFENesin-codeine (GUAIFENESIN AC) 100-10 MG/5ML liquid, 1-2 tsp every 4-6 hours as needed for cough. Monitor for drowsiness, hold contrave while on codiene cough syrup, Disp: 240 mL, Rfl: 2  •  predniSONE 5 MG (48) tablet therapy pack dose pack, Take 4 tablets by mouth Daily for 2 days, THEN 3 tablets Daily for 2 days, THEN 2 tablets Daily for 2 days, THEN 1 tablet Daily for 2 days., Disp: 20 tablet, Rfl: 0           Leyla Ayala, APRN 7/7/2022 15:43 EDT  This note has been electronically signed

## 2022-07-07 NOTE — PATIENT INSTRUCTIONS
1 g Rocephin IM  Doxycycline 100 mg twice daily x10 days   Prednisone 5 mg taper dose monitor blood sugars  Codeine cough syrup 2 teaspoons every 4-6 hours as needed for cough,hold Contrave while on this medication  COVID test  Albuterol nebulizer treatments 4 times a day while sick and then as needed

## 2022-07-08 LAB
LABCORP SARS-COV-2, NAA 2 DAY TAT: NORMAL
SARS-COV-2 RNA RESP QL NAA+PROBE: NOT DETECTED

## 2022-07-15 DIAGNOSIS — R73.9 HYPERGLYCEMIA: ICD-10-CM

## 2022-07-18 RX ORDER — PROPRANOLOL HYDROCHLORIDE 80 MG/1
TABLET ORAL
Qty: 60 TABLET | Refills: 0 | Status: SHIPPED | OUTPATIENT
Start: 2022-07-18 | End: 2022-08-30

## 2022-08-02 ENCOUNTER — TELEMEDICINE (OUTPATIENT)
Dept: FAMILY MEDICINE CLINIC | Facility: CLINIC | Age: 52
End: 2022-08-02

## 2022-08-02 DIAGNOSIS — I10 PRIMARY HYPERTENSION: ICD-10-CM

## 2022-08-02 DIAGNOSIS — E66.01 CLASS 3 SEVERE OBESITY DUE TO EXCESS CALORIES WITH SERIOUS COMORBIDITY AND BODY MASS INDEX (BMI) OF 45.0 TO 49.9 IN ADULT: ICD-10-CM

## 2022-08-02 DIAGNOSIS — E11.65 TYPE 2 DIABETES MELLITUS WITH HYPERGLYCEMIA, WITHOUT LONG-TERM CURRENT USE OF INSULIN: Primary | ICD-10-CM

## 2022-08-02 DIAGNOSIS — M51.36 LUMBAR DEGENERATIVE DISC DISEASE: ICD-10-CM

## 2022-08-02 LAB
ALBUMIN SERPL-MCNC: 4.1 G/DL (ref 3.8–4.9)
ALBUMIN/GLOB SERPL: 1.6 {RATIO} (ref 1.2–2.2)
ALP SERPL-CCNC: 66 IU/L (ref 44–121)
ALT SERPL-CCNC: 19 IU/L (ref 0–44)
AST SERPL-CCNC: 20 IU/L (ref 0–40)
BILIRUB SERPL-MCNC: 0.4 MG/DL (ref 0–1.2)
BUN SERPL-MCNC: 11 MG/DL (ref 6–24)
BUN/CREAT SERPL: 13 (ref 9–20)
CALCIUM SERPL-MCNC: 9.2 MG/DL (ref 8.7–10.2)
CHLORIDE SERPL-SCNC: 101 MMOL/L (ref 96–106)
CO2 SERPL-SCNC: 27 MMOL/L (ref 20–29)
CREAT SERPL-MCNC: 0.84 MG/DL (ref 0.76–1.27)
EGFRCR SERPLBLD CKD-EPI 2021: 104 ML/MIN/1.73
GLOBULIN SER CALC-MCNC: 2.5 G/DL (ref 1.5–4.5)
GLUCOSE SERPL-MCNC: 103 MG/DL (ref 65–99)
HBA1C MFR BLD: 6.6 % (ref 4.8–5.6)
POTASSIUM SERPL-SCNC: 4.5 MMOL/L (ref 3.5–5.2)
PROT SERPL-MCNC: 6.6 G/DL (ref 6–8.5)
SODIUM SERPL-SCNC: 141 MMOL/L (ref 134–144)

## 2022-08-02 PROCEDURE — 99214 OFFICE O/P EST MOD 30 MIN: CPT | Performed by: FAMILY MEDICINE

## 2022-08-02 RX ORDER — DULAGLUTIDE 0.75 MG/.5ML
0.75 INJECTION, SOLUTION SUBCUTANEOUS WEEKLY
Qty: 4 PEN | Refills: 5 | Status: SHIPPED | OUTPATIENT
Start: 2022-08-02 | End: 2023-02-02 | Stop reason: SDUPTHER

## 2022-08-02 NOTE — PROGRESS NOTES
Subjective   Ang Yoon is a 52 y.o. male.   Chief Complaint   Patient presents with   • Diabetes       History of Present Illness   Presents to the office today via MyChart video visit for recent diagnosis of diabetes.  I last saw him on June 20, just over a month ago.  It was in the late May that we found his A1c of 7.9%.  He has been on metformin 500 mg twice daily.  He was not able to come into the office today so we did a video visit.  Blood sugar this morning was 89.  Recent high was 295, lowest blood sugar has been 68.  He reports that when his blood sugar was 68 he felt, sweaty and lightheaded and weak.  He ate something and felt better.    On his blood sugar is high, likewise he feels weak and not very good.  He is not having any side effects of the metformin such as diarrhea, upset stomach.      As of June 20 he started on Contrave.  Weight at my last visit with him was 346 pounds.  Weight today at home is around 347 to 348 pounds.  Weight is a significant issue for him as it contributes to his blood pressure, diabetes, and has significant back problems that he has.    He does not have any recent blood pressure readings from home.    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   • Hypogonadism 06/13/2018   • Other problems related to lifestyle 04/24/2018   • Regular astigmatism 06/01/2017   • Presbyopia 06/01/2017   • Myopia 06/01/2017   • Chronic coronary artery disease 09/19/2012   • Cardiac  disease 09/19/2012   • Cyst of meniscus of knee 06/22/2012   • Resting tremor 03/05/2012   • Degenerative joint disease 02/08/2012   • Hypertension 01/18/2012   • Anxiety disorder 01/18/2012   • Obsessive-compulsive disorder 01/18/2012   • Memory loss 01/16/2012           Past Surgical History:   Procedure Laterality Date   • CARDIAC CATHETERIZATION  04/2018   • KNEE ARTHROSCOPY Right 07/09/2012     Current Outpatient Medications on File Prior to Visit   Medication Sig   • albuterol sulfate  (90 Base) MCG/ACT inhaler Inhale 2 puffs Every 4 (Four) Hours As Needed for Wheezing.   • cetirizine (ZyrTEC Allergy) 10 MG tablet Take 1 tablet by mouth Daily.   • DULoxetine (CYMBALTA) 60 MG capsule Take 1 capsule by mouth Daily.   • EQ Aspirin Adult Low Dose 81 MG EC tablet Take 1 tablet by mouth once daily   • fluticasone (Flonase) 50 MCG/ACT nasal spray 2 sprays into the nostril(s) as directed by provider Daily.   • gabapentin (NEURONTIN) 300 MG capsule TAKE 1 CAPSULE BY MOUTH THREE TIMES DAILY (Patient taking differently: Take 300 mg by mouth As Needed.)   • glucose blood test strip Use as instructed to check blood glucose once daily   • isosorbide mononitrate (IMDUR) 30 MG 24 hr tablet TAKE 1 TABLET BY MOUTH ONCE DAILY IN THE MORNING   • Lancets misc Use once daily with meter and strips to check blood glucose   • melatonin 5 MG tablet tablet Take 1 tablet by mouth At Night As Needed (insomnia).   • metFORMIN (GLUCOPHAGE) 500 MG tablet TAKE 1 TABLET BY MOUTH TWICE DAILY WITH MEALS   • naltrexone-bupropion ER (CONTRAVE) 8-90 MG tablet Take 2 tablets by mouth 2 (Two) Times a Day.   • naproxen sodium (ALEVE) 220 MG tablet Take 220 mg by mouth 2 (Two) Times a Day As Needed.   • propranolol (INDERAL) 80 MG tablet Take 1 tablet by mouth twice daily   • rosuvastatin (CRESTOR) 20 MG tablet TAKE 1 TABLET BY MOUTH ONCE DAILY AT BEDTIME   • traZODone (DESYREL) 50 MG tablet TAKE 1 TO 2 TABLETS BY MOUTH EVERY DAY AT BEDTIME AS  NEEDED   • albuterol (PROVENTIL) (2.5 MG/3ML) 0.083% nebulizer solution Take 2.5 mg by nebulization Every 4 (Four) Hours As Needed for Wheezing.   • Blood Glucose Monitoring Suppl kit Use as directed to check blood glucose   • nitroglycerin (Nitrostat) 0.4 MG SL tablet Place 1 tablet under the tongue Every 5 (Five) Minutes As Needed for Chest Pain. Take no more than 3 doses in 15 minutes.   • [DISCONTINUED] doxycycline (MONODOX) 100 MG capsule Take 1 capsule by mouth 2 (Two) Times a Day.   • [DISCONTINUED] guaiFENesin-codeine (GUAIFENESIN AC) 100-10 MG/5ML liquid 1-2 tsp every 4-6 hours as needed for cough. Monitor for drowsiness, hold contrave while on codiene cough syrup     No current facility-administered medications on file prior to visit.     Allergies   Allergen Reactions   • Niacin Rash     Social History     Socioeconomic History   • Marital status: Single   • Number of children: 2   Tobacco Use   • Smoking status: Former Smoker     Packs/day: 0.25     Years: 3.00     Pack years: 0.75     Types: Cigarettes     Start date: 1988     Quit date: 1991     Years since quittin.6   • Smokeless tobacco: Never Used   • Tobacco comment: social off and on, until  (meaning one or two cigarettes' a week.)   Vaping Use   • Vaping Use: Never used   Substance and Sexual Activity   • Alcohol use: No   • Drug use: No   • Sexual activity: Not Currently     Family History   Problem Relation Age of Onset   • Heart disease Other    • Hypertension Other    • Stroke Other    • Prostate cancer Other    • Hearing loss Mother    • Asthma Brother         childhood   • Asthma Daughter    • Arthritis Maternal Grandmother    • Arthritis Maternal Grandfather    • Diabetes Maternal Grandfather    • Stroke Maternal Grandfather    • Heart attack Maternal Grandfather        Review of Systems    Objective   There were no vitals taken for this visit.  Physical Exam  Constitutional:       General: He is not in acute distress.      Appearance: He is well-developed.   Pulmonary:      Effort: Pulmonary effort is normal. No respiratory distress.   Neurological:      Mental Status: He is alert and oriented to person, place, and time.   Psychiatric:         Speech: Speech normal.         Judgment: Judgment normal.         Office Visit on 07/07/2022   Component Date Value Ref Range Status   • SARS Antigen 07/07/2022 Not Detected  Not Detected, Presumptive Negative Final   • Influenza A Antigen YUMIKO 07/07/2022 Not Detected  Not Detected Final   • Influenza B Antigen YUMIKO 07/07/2022 Not Detected  Not Detected Final   • Internal Control 07/07/2022 Passed  Passed Final   • Lot Number 07/07/2022 [id number]   Final   • Expiration Date 07/07/2022 2/4/23   Final   • SARS-CoV-2, LORETTA 07/07/2022 Not Detected  Not Detected Final    Comment: This nucleic acid amplification test was developed and its performance  characteristics determined by [employer]. Nucleic acid  amplification tests include RT-PCR and TMA. This test has not been  FDA cleared or approved. This test has been authorized by FDA under  an Emergency Use Authorization (EUA). This test is only authorized  for the duration of time the declaration that circumstances exist  justifying the authorization of the emergency use of in vitro  diagnostic tests for detection of SARS-CoV-2 virus and/or diagnosis  of COVID-19 infection under section 564(b)(1) of the Act, 21 U.S.C.  360bbb-3(b) (1), unless the authorization is terminated or revoked  sooner.  When diagnostic testing is negative, the possibility of a false  negative result should be considered in the context of a patient's  recent exposures and the presence of clinical signs and symptoms  consistent with COVID-19. An individual without symptoms of COVID-19  and who is not shedding SARS-CoV-2 virus wo                           uld expect to have a  negative (not detected) result in this assay.     • [facility] SARS-COV-2, LROETTA 2 DAY TAT 07/07/2022  Performed   Final   Office Visit on 06/20/2022   Component Date Value Ref Range Status   • Glucose 08/01/2022 103 (A) 65 - 99 mg/dL Final   • BUN 08/01/2022 11  6 - 24 mg/dL Final   • Creatinine 08/01/2022 0.84  0.76 - 1.27 mg/dL Final   • EGFR Result 08/01/2022 104  >59 mL/min/1.73 Final   • BUN/Creatinine Ratio 08/01/2022 13  9 - 20 Final   • Sodium 08/01/2022 141  134 - 144 mmol/L Final   • Potassium 08/01/2022 4.5  3.5 - 5.2 mmol/L Final   • Chloride 08/01/2022 101  96 - 106 mmol/L Final   • Total CO2 08/01/2022 27  20 - 29 mmol/L Final   • Calcium 08/01/2022 9.2  8.7 - 10.2 mg/dL Final   • Total Protein 08/01/2022 6.6  6.0 - 8.5 g/dL Final   • Albumin 08/01/2022 4.1  3.8 - 4.9 g/dL Final   • Globulin 08/01/2022 2.5  1.5 - 4.5 g/dL Final   • A/G Ratio 08/01/2022 1.6  1.2 - 2.2 Final   • Total Bilirubin 08/01/2022 0.4  0.0 - 1.2 mg/dL Final   • Alkaline Phosphatase 08/01/2022 66  44 - 121 IU/L Final   • AST (SGOT) 08/01/2022 20  0 - 40 IU/L Final   • ALT (SGPT) 08/01/2022 19  0 - 44 IU/L Final   • Hemoglobin A1C 08/01/2022 6.6 (A) 4.8 - 5.6 % Final    Comment:          Prediabetes: 5.7 - 6.4           Diabetes: >6.4           Glycemic control for adults with diabetes: <7.0     Office Visit on 04/12/2022   Component Date Value Ref Range Status   • Glucose 04/12/2022 159 (A) 65 - 99 mg/dL Final   • BUN 04/12/2022 12  6 - 24 mg/dL Final   • Creatinine 04/12/2022 0.74 (A) 0.76 - 1.27 mg/dL Final   • EGFR Result 04/12/2022 109  >59 mL/min/1.73 Final   • BUN/Creatinine Ratio 04/12/2022 16  9 - 20 Final   • Sodium 04/12/2022 140  134 - 144 mmol/L Final   • Potassium 04/12/2022 4.5  3.5 - 5.2 mmol/L Final   • Chloride 04/12/2022 100  96 - 106 mmol/L Final   • Total CO2 04/12/2022 24  20 - 29 mmol/L Final   • Calcium 04/12/2022 8.8  8.7 - 10.2 mg/dL Final   • Total Protein 04/12/2022 6.6  6.0 - 8.5 g/dL Final   • Albumin 04/12/2022 4.2  3.8 - 4.9 g/dL Final   • Globulin 04/12/2022 2.4  1.5 - 4.5 g/dL Final   • A/G Ratio  04/12/2022 1.8  1.2 - 2.2 Final   • Total Bilirubin 04/12/2022 0.4  0.0 - 1.2 mg/dL Final   • Alkaline Phosphatase 04/12/2022 84  44 - 121 IU/L Final   • AST (SGOT) 04/12/2022 30  0 - 40 IU/L Final   • ALT (SGPT) 04/12/2022 33  0 - 44 IU/L Final   • Total Cholesterol 04/12/2022 142  100 - 199 mg/dL Final   • Triglycerides 04/12/2022 446 (A) 0 - 149 mg/dL Final   • HDL Cholesterol 04/12/2022 36 (A) >39 mg/dL Final   • VLDL Cholesterol Jovanny 04/12/2022 65 (A) 5 - 40 mg/dL Final   • LDL Chol Calc (NIH) 04/12/2022 41  0 - 99 mg/dL Final   • WBC 04/12/2022 7.4  3.4 - 10.8 x10E3/uL Final   • RBC 04/12/2022 5.26  4.14 - 5.80 x10E6/uL Final   • Hemoglobin 04/12/2022 15.9  13.0 - 17.7 g/dL Final   • Hematocrit 04/12/2022 48.7  37.5 - 51.0 % Final   • MCV 04/12/2022 93  79 - 97 fL Final   • MCH 04/12/2022 30.2  26.6 - 33.0 pg Final   • MCHC 04/12/2022 32.6  31.5 - 35.7 g/dL Final   • RDW 04/12/2022 12.7  11.6 - 15.4 % Final   • Platelets 04/12/2022 71 (A) 150 - 450 x10E3/uL Final    Platelet count verified by examination of peripheral blood smear.   • Neutrophil Rel % 04/12/2022 60  Not Estab. % Final   • Lymphocyte Rel % 04/12/2022 29  Not Estab. % Final   • Monocyte Rel % 04/12/2022 8  Not Estab. % Final   • Eosinophil Rel % 04/12/2022 2  Not Estab. % Final   • Basophil Rel % 04/12/2022 1  Not Estab. % Final   • Neutrophils Absolute 04/12/2022 4.5  1.4 - 7.0 x10E3/uL Final   • Lymphocytes Absolute 04/12/2022 2.2  0.7 - 3.1 x10E3/uL Final   • Monocytes Absolute 04/12/2022 0.6  0.1 - 0.9 x10E3/uL Final   • Eosinophils Absolute 04/12/2022 0.1  0.0 - 0.4 x10E3/uL Final   • Basophils Absolute 04/12/2022 0.1  0.0 - 0.2 x10E3/uL Final   • Immature Granulocyte Rel % 04/12/2022 0  Not Estab. % Final   • Immature Grans Absolute 04/12/2022 0.0  0.0 - 0.1 x10E3/uL Final   • Hematology Comments: 04/12/2022 Note:   Final    Verified by microscopic examination.   • Hemoglobin A1C 04/12/2022 7.9 (A) 4.8 - 5.6 % Final    Comment:           Prediabetes: 5.7 - 6.4           Diabetes: >6.4           Glycemic control for adults with diabetes: <7.0     • Specimen Status 04/12/2022 Comment   Final    Comment: Written Authorization  Written Authorization  Written Authorization Received.  Authorization received from IMAN MARTINEZ 04-  Logged by Mik Yusuf           Assessment & Plan   Diagnoses and all orders for this visit:    1. Type 2 diabetes mellitus with hyperglycemia, without long-term current use of insulin ([facility]) (Primary)  -     Dulaglutide (Trulicity) 0.75 MG/0.5ML solution pen-injector; Inject 0.75 mg under the skin into the appropriate area as directed 1 (One) Time Per Week.  Dispense: 4 pen; Refill: 5  -     Comprehensive Metabolic Panel; Future  -     Hemoglobin A1c; Future    2. Class 3 severe obesity due to excess calories with serious comorbidity and body mass index (BMI) of 45.0 to 49.9 in adult ([facility])    3. Primary hypertension    4. Lumbar degenerative disc disease    This is a follow-up on a recent diagnosis of diabetes type 2.  He had an A1c done a few days ago which has gone down to 6.6%.  It sounds like he is having borderline control with the metformin his blood sugars are still ranging in the 295 range.  I am concerned that trying to raise the metformin may cause his lower numbers to go even lower.  I think you would be an excellent candidate for something like Trulicity and hopefully we can back off of the metformin or even stop it.  We will start him at 0.75 mg weekly.  This also hopefully will help with his weight which will help his blood pressure and back problems as well.  Okay to continue Contrave until his current supply runs out.  Call me or message me with any problems before the next visit.  I would like to see him again in 3 months with blood work a few days before.        Call with any problems or concerns before next visit       Return in about 3 months (around 11/2/2022), or with labs a few days  before.      Much of this report is an electronic transcription of spoken language to printed text using Dragon dictation software.  As such, the subtleties and finesse of spoken language may permit erroneous, or at times, nonsensical words or phrases to be inadvertently transcribed; thus changes may be made at a later date to rectify these errors.     Traci Bass MD8/2/202211:45 EDT  This note has been electronically signed

## 2022-08-12 DIAGNOSIS — E78.2 MIXED HYPERLIPIDEMIA: ICD-10-CM

## 2022-08-15 RX ORDER — ROSUVASTATIN CALCIUM 20 MG/1
TABLET, COATED ORAL
Qty: 90 TABLET | Refills: 0 | Status: SHIPPED | OUTPATIENT
Start: 2022-08-15 | End: 2022-08-30

## 2022-08-15 RX ORDER — ISOSORBIDE MONONITRATE 30 MG/1
TABLET, EXTENDED RELEASE ORAL
Qty: 90 TABLET | Refills: 0 | Status: SHIPPED | OUTPATIENT
Start: 2022-08-15 | End: 2022-11-01

## 2022-08-30 DIAGNOSIS — M51.36 LUMBAR DEGENERATIVE DISC DISEASE: ICD-10-CM

## 2022-08-30 DIAGNOSIS — E78.2 MIXED HYPERLIPIDEMIA: ICD-10-CM

## 2022-08-30 RX ORDER — ROSUVASTATIN CALCIUM 20 MG/1
TABLET, COATED ORAL
Qty: 90 TABLET | Refills: 3 | Status: SHIPPED | OUTPATIENT
Start: 2022-08-30

## 2022-08-30 RX ORDER — GABAPENTIN 300 MG/1
CAPSULE ORAL
Qty: 90 CAPSULE | Refills: 0 | Status: SHIPPED | OUTPATIENT
Start: 2022-08-30 | End: 2022-09-16

## 2022-08-30 RX ORDER — PROPRANOLOL HYDROCHLORIDE 80 MG/1
TABLET ORAL
Qty: 180 TABLET | Refills: 3 | Status: SHIPPED | OUTPATIENT
Start: 2022-08-30

## 2022-09-13 RX ORDER — DULOXETIN HYDROCHLORIDE 60 MG/1
60 CAPSULE, DELAYED RELEASE ORAL DAILY
Qty: 30 CAPSULE | Refills: 3 | Status: SHIPPED | OUTPATIENT
Start: 2022-09-13 | End: 2022-09-16 | Stop reason: SDUPTHER

## 2022-09-13 NOTE — TELEPHONE ENCOUNTER
Caller: Ang Yoon    Relationship: Self    Best call back number: [phone] (H)    Requested Prescriptions:   Requested Prescriptions     Pending Prescriptions Disp Refills   • DULoxetine (CYMBALTA) 60 MG capsule 30 capsule 3     Sig: Take 1 capsule by mouth Daily.        Pharmacy where request should be sent: [facility] [address] [place] [address] [place] [phone] [place] [phone]      Additional details provided by patient: PATIENT HAS BEEN OUT FOR 4 DAYS OR SO, STARTING TO FEEL THE ABSENCE OF THE MEDICATION.    PHARMACY IS NOT GETTING A RESPONSE FROM OUR OFFICE ABOUT A REFILL, PATIENT SAYS    PLEASE ADVISE     Does the patient have less than a 3 day supply:  [x] Yes  [] No    Trinidad Farfan, ADRIAN   09/13/22 08:29 EDT

## 2022-09-16 ENCOUNTER — OFFICE VISIT (OUTPATIENT)
Dept: FAMILY MEDICINE CLINIC | Facility: CLINIC | Age: 52
End: 2022-09-16

## 2022-09-16 VITALS
HEIGHT: 73 IN | TEMPERATURE: 98.6 F | DIASTOLIC BLOOD PRESSURE: 70 MMHG | WEIGHT: 315 LBS | HEART RATE: 75 BPM | RESPIRATION RATE: 18 BRPM | BODY MASS INDEX: 41.75 KG/M2 | OXYGEN SATURATION: 97 % | SYSTOLIC BLOOD PRESSURE: 100 MMHG

## 2022-09-16 DIAGNOSIS — M54.41 CHRONIC MIDLINE LOW BACK PAIN WITH BILATERAL SCIATICA: ICD-10-CM

## 2022-09-16 DIAGNOSIS — E11.65 TYPE 2 DIABETES MELLITUS WITH HYPERGLYCEMIA, WITHOUT LONG-TERM CURRENT USE OF INSULIN: Primary | ICD-10-CM

## 2022-09-16 DIAGNOSIS — M54.42 CHRONIC MIDLINE LOW BACK PAIN WITH BILATERAL SCIATICA: ICD-10-CM

## 2022-09-16 DIAGNOSIS — M51.36 LUMBAR DEGENERATIVE DISC DISEASE: ICD-10-CM

## 2022-09-16 DIAGNOSIS — G89.29 CHRONIC MIDLINE LOW BACK PAIN WITH BILATERAL SCIATICA: ICD-10-CM

## 2022-09-16 PROCEDURE — 99213 OFFICE O/P EST LOW 20 MIN: CPT | Performed by: FAMILY MEDICINE

## 2022-09-16 RX ORDER — DULOXETIN HYDROCHLORIDE 60 MG/1
60 CAPSULE, DELAYED RELEASE ORAL DAILY
Qty: 90 CAPSULE | Refills: 3 | Status: SHIPPED | OUTPATIENT
Start: 2022-09-16

## 2022-09-16 NOTE — PROGRESS NOTES
Subjective   Ang Yoon is a 52 y.o. male.   Chief Complaint   Patient presents with   • Diabetes   • Hypertension   • Back Pain       History of Present Illness   Presents to the office today for follow-up.  We found that his blood sugars were elevated back in late April.  His A1c was 7.9%.  Since then we began treating him with Trulicity.  He had an A1c done a month ago and it was down to 6.6% which is excellent.  Likewise his CMP was normal.  His weight was 360 pounds back in April.  July 7 he was 348.  We did a virtual visit August 1.  Today's weight is in addition to the Trulicity and metformin he is on Contrave.  He continues to work very hard on his diet to minimize simple sugars.  He also continues with a heightened effort at weight loss.  Weight today is down to 341 pounds.  He was around 360 pounds in early spring of this year.    Chronic back pain - out of Cymbalta currently.  He noticed when he ran out of it he does have an increase in his back pain.  Mood did not deteriorate much, though.  He is interested in trying to get off as much medication as possible.  He is not sure the gabapentin is contributing much to his pain control and he would like to reduce that and stop it.        Patient Active Problem List    Diagnosis Date Noted   • Type 2 diabetes mellitus with hyperglycemia, without long-term current use of insulin ([facility]) 09/17/2022   • Cervical radiculopathy 08/17/2020   • Lumbar degenerative disc disease 04/29/2020   • Chronic midline low back pain with bilateral sciatica 04/29/2020   • Reactive airway disease with acute exacerbation 11/01/2019   • Hyperlipidemia 07/11/2019   • Class 3 severe obesity due to excess calories with serious comorbidity and body mass index (BMI) of 45.0 to 49.9 in adult ([facility]) 07/11/2019   • Dyspnea 07/02/2019   • GUILLERMINA (obstructive sleep apnea) 07/02/2019   • 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   • Hypogonadism 06/13/2018   • Other problems related to lifestyle  04/24/2018   • Regular astigmatism 06/01/2017   • Presbyopia 06/01/2017   • Myopia 06/01/2017   • Chronic coronary artery disease 09/19/2012   • Cardiac disease 09/19/2012   • Cyst of meniscus of knee 06/22/2012   • Resting tremor 03/05/2012   • Degenerative joint disease 02/08/2012   • Hypertension 01/18/2012   • Anxiety disorder 01/18/2012   • Obsessive-compulsive disorder 01/18/2012   • Memory loss 01/16/2012           Past Surgical History:   Procedure Laterality Date   • CARDIAC CATHETERIZATION  04/2018   • KNEE ARTHROSCOPY Right 07/09/2012     Current Outpatient Medications on File Prior to Visit   Medication Sig   • albuterol (PROVENTIL) (2.5 MG/3ML) 0.083% nebulizer solution Take 2.5 mg by nebulization Every 4 (Four) Hours As Needed for Wheezing.   • albuterol sulfate  (90 Base) MCG/ACT inhaler Inhale 2 puffs Every 4 (Four) Hours As Needed for Wheezing.   • Blood Glucose Monitoring Suppl kit Use as directed to check blood glucose   • cetirizine (ZyrTEC Allergy) 10 MG tablet Take 1 tablet by mouth Daily.   • Dulaglutide (Trulicity) 0.75 MG/0.5ML solution pen-injector Inject 0.75 mg under the skin into the appropriate area as directed 1 (One) Time Per Week.   • EQ Aspirin Adult Low Dose 81 MG EC tablet Take 1 tablet by mouth once daily   • fluticasone (Flonase) 50 MCG/ACT nasal spray 2 sprays into the nostril(s) as directed by provider Daily.   • glucose blood test strip Use as instructed to check blood glucose once daily   • isosorbide mononitrate (IMDUR) 30 MG 24 hr tablet TAKE 1 TABLET BY MOUTH ONCE DAILY IN THE MORNING   • Lancets misc Use once daily with meter and strips to check blood glucose   • melatonin 5 MG tablet tablet Take 1 tablet by mouth At Night As Needed (insomnia).   • metFORMIN (GLUCOPHAGE) 500 MG tablet TAKE 1 TABLET BY MOUTH TWICE DAILY WITH MEALS   • naltrexone-bupropion ER (CONTRAVE) 8-90 MG tablet Take 2 tablets by mouth 2 (Two) Times a Day.   • naproxen sodium (ALEVE) 220 MG  "tablet Take 220 mg by mouth 2 (Two) Times a Day As Needed.   • nitroglycerin (Nitrostat) 0.4 MG SL tablet Place 1 tablet under the tongue Every 5 (Five) Minutes As Needed for Chest Pain. Take no more than 3 doses in 15 minutes.   • propranolol (INDERAL) 80 MG tablet Take 1 tablet by mouth twice daily   • rosuvastatin (CRESTOR) 20 MG tablet TAKE 1 TABLET BY MOUTH ONCE DAILY AT BEDTIME   • traZODone (DESYREL) 50 MG tablet TAKE 1 TO 2 TABLETS BY MOUTH EVERY DAY AT BEDTIME AS NEEDED     No current facility-administered medications on file prior to visit.     Allergies   Allergen Reactions   • Niacin Rash     Social History     Socioeconomic History   • Marital status: Single   • Number of children: 2   Tobacco Use   • Smoking status: Former Smoker     Packs/day: 0.25     Years: 3.00     Pack years: 0.75     Types: Cigarettes     Start date: 1988     Quit date: 1991     Years since quittin.7   • Smokeless tobacco: Never Used   • Tobacco comment: social off and on, until  (meaning one or two cigarettes' a week.)   Vaping Use   • Vaping Use: Never used   Substance and Sexual Activity   • Alcohol use: No   • Drug use: No   • Sexual activity: Not Currently     Family History   Problem Relation Age of Onset   • Heart disease Other    • Hypertension Other    • Stroke Other    • Prostate cancer Other    • Hearing loss Mother    • Asthma Brother         childhood   • Asthma Daughter    • Arthritis Maternal Grandmother    • Arthritis Maternal Grandfather    • Diabetes Maternal Grandfather    • Stroke Maternal Grandfather    • Heart attack Maternal Grandfather        Review of Systems    Objective   /70 (BP Location: Left arm, Patient Position: Sitting, Cuff Size: Large Adult)   Pulse 75   Temp 98.6 °F (37 °C) (Infrared)   Resp 18   Ht 185.4 cm (73\")   Wt (!) 155 kg (341 lb 6.4 oz)   SpO2 97%   BMI 45.04 kg/m²   Physical Exam  Constitutional:       Appearance: He is well-developed. He is obese. He is " not toxic-appearing.      Comments: Wearing a face mask  Sits straight up in the chair.  Repositions himself for comfort.     HENT:      Head: Normocephalic and atraumatic.   Eyes:      Conjunctiva/sclera: Conjunctivae normal.   Cardiovascular:      Rate and Rhythm: Normal rate and regular rhythm.   Pulmonary:      Effort: Pulmonary effort is normal. No respiratory distress.   Musculoskeletal:         General: Normal range of motion.      Cervical back: Normal range of motion.   Skin:     General: Skin is warm and dry.      Findings: No rash.   Neurological:      Mental Status: He is alert and oriented to person, place, and time.      Gait: Gait abnormal (Antalgic).   Psychiatric:         Behavior: Behavior normal.                 Assessment & Plan   Diagnoses and all orders for this visit:    1. Type 2 diabetes mellitus with hyperglycemia, without long-term current use of insulin (HCC) (Primary)    2. Lumbar degenerative disc disease    3. Chronic midline low back pain with bilateral sciatica  -     DULoxetine (CYMBALTA) 60 MG capsule; Take 1 capsule by mouth Daily.  Dispense: 90 capsule; Refill: 3    Refill Cymbalta today at 60 mg/day.  I believe this has been helpful in minimizing his chronic back pain as exemplified by the fact his pain got a little worse when he ran out of it.  Have asked him to let me know when he needs a refill in future and I will be happy to refill this.  Regarding the gabapentin, I think he can increase to 1 pill twice daily for 4 days and then decrease to 1 pill at bedtime for 4 days and then stop it.  If he has worsening back pain or radicular symptoms, we can reevaluate.  With regard to his diabetes, continue to follow blood sugars couple times a week.  Continue Trulicity.  He will be due for a repeat A1c in about 2 months.  I would like him to get that a few days before follow-up visit.  He has current orders in the computer for an A1c and a CMP for mid November.  I congratulated him  on his weight loss and have encouraged him to continue with his diet and exercise plan.  Overall, I spent 25 minutes today with Bill discussing all the above.        Call with any problems or concerns before next visit       Return in about 2 months (around 11/16/2022), or with labs a few days before.      Much of this report is an electronic transcription of spoken language to printed text using Dragon dictation software.  As such, the subtleties and finesse of spoken language may permit erroneous, or at times, nonsensical words or phrases to be inadvertently transcribed; thus changes may be made at a later date to rectify these errors.     Traci Bass MD9/17/202217:34 EDT  This note has been electronically signed

## 2022-09-17 PROBLEM — E11.65 TYPE 2 DIABETES MELLITUS WITH HYPERGLYCEMIA, WITHOUT LONG-TERM CURRENT USE OF INSULIN: Status: ACTIVE | Noted: 2022-09-17

## 2022-11-01 DIAGNOSIS — R73.9 HYPERGLYCEMIA: ICD-10-CM

## 2022-11-01 DIAGNOSIS — M51.36 LUMBAR DEGENERATIVE DISC DISEASE: ICD-10-CM

## 2022-11-01 RX ORDER — GABAPENTIN 300 MG/1
CAPSULE ORAL
Qty: 90 CAPSULE | Refills: 0 | OUTPATIENT
Start: 2022-11-01

## 2022-11-01 RX ORDER — ISOSORBIDE MONONITRATE 30 MG/1
TABLET, EXTENDED RELEASE ORAL
Qty: 90 TABLET | Refills: 3 | Status: SHIPPED | OUTPATIENT
Start: 2022-11-01

## 2022-11-07 DIAGNOSIS — M51.36 LUMBAR DEGENERATIVE DISC DISEASE: ICD-10-CM

## 2022-11-07 RX ORDER — GABAPENTIN 300 MG/1
CAPSULE ORAL
Qty: 90 CAPSULE | Refills: 0 | OUTPATIENT
Start: 2022-11-07

## 2022-11-22 ENCOUNTER — OFFICE VISIT (OUTPATIENT)
Dept: FAMILY MEDICINE CLINIC | Facility: CLINIC | Age: 52
End: 2022-11-22

## 2022-11-22 VITALS
SYSTOLIC BLOOD PRESSURE: 120 MMHG | WEIGHT: 315 LBS | HEART RATE: 69 BPM | TEMPERATURE: 95.7 F | HEIGHT: 73 IN | OXYGEN SATURATION: 98 % | DIASTOLIC BLOOD PRESSURE: 80 MMHG | RESPIRATION RATE: 18 BRPM | BODY MASS INDEX: 41.75 KG/M2

## 2022-11-22 DIAGNOSIS — G89.29 CHRONIC MIDLINE LOW BACK PAIN WITH BILATERAL SCIATICA: ICD-10-CM

## 2022-11-22 DIAGNOSIS — E78.2 MIXED HYPERLIPIDEMIA: ICD-10-CM

## 2022-11-22 DIAGNOSIS — M54.42 CHRONIC MIDLINE LOW BACK PAIN WITH BILATERAL SCIATICA: ICD-10-CM

## 2022-11-22 DIAGNOSIS — E66.01 CLASS 3 SEVERE OBESITY DUE TO EXCESS CALORIES WITH SERIOUS COMORBIDITY AND BODY MASS INDEX (BMI) OF 45.0 TO 49.9 IN ADULT: ICD-10-CM

## 2022-11-22 DIAGNOSIS — I25.10 CHRONIC CORONARY ARTERY DISEASE: ICD-10-CM

## 2022-11-22 DIAGNOSIS — M54.41 CHRONIC MIDLINE LOW BACK PAIN WITH BILATERAL SCIATICA: ICD-10-CM

## 2022-11-22 DIAGNOSIS — E11.65 TYPE 2 DIABETES MELLITUS WITH HYPERGLYCEMIA, WITHOUT LONG-TERM CURRENT USE OF INSULIN: Primary | ICD-10-CM

## 2022-11-22 DIAGNOSIS — Z23 NEED FOR INFLUENZA VACCINATION: ICD-10-CM

## 2022-11-22 DIAGNOSIS — I10 PRIMARY HYPERTENSION: ICD-10-CM

## 2022-11-22 PROCEDURE — 99214 OFFICE O/P EST MOD 30 MIN: CPT | Performed by: FAMILY MEDICINE

## 2022-11-22 PROCEDURE — 90686 IIV4 VACC NO PRSV 0.5 ML IM: CPT | Performed by: FAMILY MEDICINE

## 2022-11-22 PROCEDURE — 90471 IMMUNIZATION ADMIN: CPT | Performed by: FAMILY MEDICINE

## 2022-11-22 NOTE — PROGRESS NOTES
Subjective   Ang Yoon is a 52 y.o. male.   Chief Complaint   Patient presents with   • Diabetes   • Hypertension   • Hyperlipidemia   • Coronary Artery Disease   • Back Pain       History of Present Illness   Presents to the office today to follow-up on recent diagnosis of type 2 diabetes, hypertension, chronic low back pain.    Diabetes type 2- last A1c in August was pretty good at 6.6%.  He has had a good response to Trulicity and metformin.  BS range 80 - 120.     HTN-  B/P today is 120/80.   On no B/P meds other than cardiac meds.    Chronic back pain-please see last note for details.  He felt the gabapentin really was not very helpful so he stopped it.  He is still on Cymbalta 60 mg/day.  He is now managing his back pain with positioning, stretching, Tylenol.  He does believe the weight loss so far has been helpful.  He tries to stay active and continues to walk is much as he can.    Weight- we will follow this also as well with hopes the weight reduction would help his blood sugar and blood pressure.  His weight in May was 352 pounds weight at last visit on September 16 was 341 pounds.  Weight today is 329.  dOWN ANOTHER 12 POUNDS.  Walking more.  Some side effects of getting off metformin.  Personal starting weight was 372.     HLD-he remains on Crestor 20 mg/day.  Last lipid panel was April of this year and it was good except for his triglycerides.    Chronic coronary artery disease-asymptomatic.  No CP, No PND.    Patient Active Problem List    Diagnosis Date Noted   • Type 2 diabetes mellitus with hyperglycemia, without long-term current use of insulin ([facility]) 09/17/2022   • Cervical radiculopathy 08/17/2020   • Lumbar degenerative disc disease 04/29/2020   • Chronic midline low back pain with bilateral sciatica 04/29/2020   • Reactive airway disease with acute exacerbation 11/01/2019   • Hyperlipidemia 07/11/2019   • Class 3 severe obesity due to excess calories with serious comorbidity and body  mass index (BMI) of 45.0 to 49.9 in adult ([facility]) 07/11/2019   • Dyspnea 07/02/2019   • GUILLERMINA (obstructive sleep apnea) 07/02/2019   • 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   • Hypogonadism 06/13/2018   • Other problems related to lifestyle 04/24/2018   • Regular astigmatism 06/01/2017   • Presbyopia 06/01/2017   • Myopia 06/01/2017   • Chronic coronary artery disease 09/19/2012   • Cardiac disease 09/19/2012   • Cyst of meniscus of knee 06/22/2012   • Resting tremor 03/05/2012   • Degenerative joint disease 02/08/2012   • Hypertension 01/18/2012   • Anxiety disorder 01/18/2012   • Obsessive-compulsive disorder 01/18/2012   • Memory loss 01/16/2012           Past Surgical History:   Procedure Laterality Date   • CARDIAC CATHETERIZATION  04/2018   • KNEE ARTHROSCOPY Right 07/09/2012     Current Outpatient Medications on File Prior to Visit   Medication Sig   • albuterol (PROVENTIL) (2.5 MG/3ML) 0.083% nebulizer solution Take 2.5 mg by nebulization Every 4 (Four) Hours As Needed for Wheezing.   • albuterol sulfate  (90 Base) MCG/ACT inhaler Inhale 2 puffs Every 4 (Four) Hours As Needed for Wheezing.   • Blood Glucose Monitoring Suppl kit Use as directed to check blood glucose   • Dulaglutide (Trulicity) 0.75 MG/0.5ML solution pen-injector Inject 0.75 mg under the skin into the appropriate area as directed 1 (One) Time Per Week.   • DULoxetine (CYMBALTA) 60 MG capsule Take 1 capsule by mouth Daily.   • EQ Aspirin Adult Low Dose 81 MG EC tablet Take 1 tablet by mouth once daily   • fluticasone (Flonase) 50 MCG/ACT nasal spray 2 sprays into the nostril(s) as directed by provider Daily.   • glucose blood test strip Use as instructed to check blood glucose once daily   • isosorbide mononitrate (IMDUR) 30 MG 24 hr tablet TAKE 1 TABLET BY MOUTH ONCE DAILY IN THE MORNING   • Lancets misc Use once daily with meter and strips to check blood glucose   • metFORMIN (GLUCOPHAGE) 500 MG tablet TAKE 1 TABLET BY MOUTH TWICE DAILY  WITH MEALS   • naltrexone-bupropion ER (CONTRAVE) 8-90 MG tablet Take 2 tablets by mouth 2 (Two) Times a Day.   • naproxen sodium (ALEVE) 220 MG tablet Take 220 mg by mouth 2 (Two) Times a Day As Needed.   • propranolol (INDERAL) 80 MG tablet Take 1 tablet by mouth twice daily   • rosuvastatin (CRESTOR) 20 MG tablet TAKE 1 TABLET BY MOUTH ONCE DAILY AT BEDTIME   • traZODone (DESYREL) 50 MG tablet TAKE 1 TO 2 TABLETS BY MOUTH EVERY DAY AT BEDTIME AS NEEDED   • cetirizine (ZyrTEC Allergy) 10 MG tablet Take 1 tablet by mouth Daily.   • melatonin 5 MG tablet tablet Take 1 tablet by mouth At Night As Needed (insomnia).   • nitroglycerin (Nitrostat) 0.4 MG SL tablet Place 1 tablet under the tongue Every 5 (Five) Minutes As Needed for Chest Pain. Take no more than 3 doses in 15 minutes.     No current facility-administered medications on file prior to visit.     Allergies   Allergen Reactions   • Niacin Rash     Social History     Socioeconomic History   • Marital status: Single   • Number of children: 2   Tobacco Use   • Smoking status: Former     Packs/day: 0.25     Years: 3.00     Pack years: 0.75     Types: Cigarettes     Start date: 1988     Quit date: 1991     Years since quittin.9     Passive exposure: Past   • Smokeless tobacco: Never   • Tobacco comments:     social off and on, until  (meaning one or two cigarettes' a week.)   Vaping Use   • Vaping Use: Never used   Substance and Sexual Activity   • Alcohol use: No   • Drug use: No   • Sexual activity: Not Currently     Family History   Problem Relation Age of Onset   • Heart disease Other    • Hypertension Other    • Stroke Other    • Prostate cancer Other    • Hearing loss Mother    • Asthma Brother         childhood   • Asthma Daughter    • Arthritis Maternal Grandmother    • Arthritis Maternal Grandfather    • Diabetes Maternal Grandfather    • Stroke Maternal Grandfather    • Heart attack Maternal Grandfather        Review of  "Systems    Objective   /80 (BP Location: Left arm, Patient Position: Sitting, Cuff Size: Large Adult)   Pulse 69   Temp 95.7 °F (35.4 °C) (Infrared)   Resp 18   Ht 185.4 cm (73\")   Wt (!) 149 kg (329 lb)   SpO2 98%   BMI 43.41 kg/m²   Physical Exam  Constitutional:       Appearance: He is well-developed.      Comments: Wearing a face mask     HENT:      Head: Normocephalic and atraumatic.   Eyes:      Conjunctiva/sclera: Conjunctivae normal.   Cardiovascular:      Rate and Rhythm: Normal rate.   Pulmonary:      Effort: Pulmonary effort is normal.   Musculoskeletal:         General: Normal range of motion.      Cervical back: Normal range of motion.   Skin:     General: Skin is warm and dry.      Findings: No rash.   Neurological:      Mental Status: He is alert and oriented to person, place, and time.   Psychiatric:         Behavior: Behavior normal.                 Assessment & Plan   Diagnoses and all orders for this visit:    1. Type 2 diabetes mellitus with hyperglycemia, without long-term current use of insulin ([facility]) (Primary)  -     Hemoglobin A1c  -     Comprehensive Metabolic Panel  -     Comprehensive Metabolic Panel; Future  -     Hemoglobin A1c; Future    2. Primary hypertension    3. Chronic midline low back pain with bilateral sciatica    4. Class 3 severe obesity due to excess calories with serious comorbidity and body mass index (BMI) of 45.0 to 49.9 in adult ([facility])    5. Chronic coronary artery disease    6. Mixed hyperlipidemia    7. Need for influenza vaccination  -     FluLaval/Fluzone >6 mos ([id number])    Overall, he is doing well.  Chronic back pain is managed well with activity adjustment, Cymbalta 60 mg/day.  Continue Cymbalta at 60 mg/day.  He is asymptomatic with regard to his coronary artery disease.  Congratulated on his weight loss and encouraged to continue.  This will no doubt help his blood sugar, his blood pressure, his back pain, hopefully his energy level.  Blood " pressure in the office today is excellent at 120/80.  He is not on blood pressure medicines per se.  He is on Inderal and Imdur for cardiac reasons.  Finally, his blood sugar control is excellent.  We will recheck an A1c along with other labs today.  I will follow-up with him when the results of this test are back.  Flu shot today.  Follow-up here again in 3 months or sooner if needed.      Call with any problems or concerns before next visit       Return in about 3 months (around 2/22/2023).      Much of this report is an electronic transcription of spoken language to printed text using Dragon dictation software.  As such, the subtleties and finesse of spoken language may permit erroneous, or at times, nonsensical words or phrases to be inadvertently transcribed; thus changes may be made at a later date to rectify these errors.     Traci Bass MD11/23/202207:23 EST  This note has been electronically signed

## 2022-11-23 LAB
ALBUMIN SERPL-MCNC: 4.4 G/DL (ref 3.8–4.9)
ALBUMIN/GLOB SERPL: 2 {RATIO} (ref 1.2–2.2)
ALP SERPL-CCNC: 62 IU/L (ref 44–121)
ALT SERPL-CCNC: 19 IU/L (ref 0–44)
AST SERPL-CCNC: 24 IU/L (ref 0–40)
BILIRUB SERPL-MCNC: 0.5 MG/DL (ref 0–1.2)
BUN SERPL-MCNC: 17 MG/DL (ref 6–24)
BUN/CREAT SERPL: 20 (ref 9–20)
CALCIUM SERPL-MCNC: 9.2 MG/DL (ref 8.7–10.2)
CHLORIDE SERPL-SCNC: 99 MMOL/L (ref 96–106)
CO2 SERPL-SCNC: 25 MMOL/L (ref 20–29)
CREAT SERPL-MCNC: 0.86 MG/DL (ref 0.76–1.27)
EGFRCR SERPLBLD CKD-EPI 2021: 104 ML/MIN/1.73
GLOBULIN SER CALC-MCNC: 2.2 G/DL (ref 1.5–4.5)
GLUCOSE SERPL-MCNC: 107 MG/DL (ref 70–99)
HBA1C MFR BLD: 5.7 % (ref 4.8–5.6)
POTASSIUM SERPL-SCNC: 4.5 MMOL/L (ref 3.5–5.2)
PROT SERPL-MCNC: 6.6 G/DL (ref 6–8.5)
SODIUM SERPL-SCNC: 138 MMOL/L (ref 134–144)

## 2022-12-13 DIAGNOSIS — E66.01 CLASS 3 SEVERE OBESITY DUE TO EXCESS CALORIES WITH SERIOUS COMORBIDITY AND BODY MASS INDEX (BMI) OF 45.0 TO 49.9 IN ADULT: ICD-10-CM

## 2023-02-02 DIAGNOSIS — R06.02 SHORTNESS OF BREATH: ICD-10-CM

## 2023-02-02 DIAGNOSIS — J45.41 MODERATE PERSISTENT REACTIVE AIRWAY DISEASE WITH ACUTE EXACERBATION: ICD-10-CM

## 2023-02-02 DIAGNOSIS — E11.65 TYPE 2 DIABETES MELLITUS WITH HYPERGLYCEMIA, WITHOUT LONG-TERM CURRENT USE OF INSULIN: ICD-10-CM

## 2023-02-02 RX ORDER — FLUTICASONE PROPIONATE 50 MCG
SPRAY, SUSPENSION (ML) NASAL
Qty: 16 G | Refills: 2 | Status: SHIPPED | OUTPATIENT
Start: 2023-02-02

## 2023-02-02 RX ORDER — DULAGLUTIDE 0.75 MG/.5ML
INJECTION, SOLUTION SUBCUTANEOUS
Qty: 4 ML | Refills: 5 | Status: SHIPPED | OUTPATIENT
Start: 2023-02-02

## 2023-05-22 DIAGNOSIS — G47.09 OTHER INSOMNIA: ICD-10-CM

## 2023-05-22 RX ORDER — TRAZODONE HYDROCHLORIDE 50 MG/1
TABLET ORAL
Qty: 60 TABLET | Refills: 5 | Status: SHIPPED | OUTPATIENT
Start: 2023-05-22

## 2023-05-26 NOTE — TELEPHONE ENCOUNTER
[facility] - Telemetry Stepdown  Initial Discharge Assessment       Primary Care Provider: Jasbir Haney MD    Admission Diagnosis: Chest pain [R07.9]  (HFpEF) heart failure with preserved ejection fraction [I50.30]  Acute pancreatitis without infection or necrosis, unspecified pancreatitis type [K85.90]    Admission Date: 5/25/2023  Expected Discharge Date: 5/26/2023    Transition of Care Barriers: None    Payor: [place] [employer] [facility] / Plan: BCBS ALL OUT OF STATE / Product Type: PPO /     Extended Emergency Contact Information  Primary Emergency Contact: Shamir Huff  Address: [facility] [place] [address] [facility]  Home Phone: [phone]  Mobile Phone: [phone]  Relation: Spouse  Secondary Emergency Contact: Huff Joseph  Mobile Phone: [phone]  Relation: Son  Preferred language: English    Discharge Plan A: Home  Discharge Plan B: Home with family      [facility] [place] [address] [address]  [facility] [address]  Phone: [phone] Fax: [phone]    [facility]/pharmacy #[id number] - [address] AT [address]NER OF [address] [address]  Phone: [phone] Fax: [phone]    [facility] - [address] [address]  Phone: [phone] Fax: [phone]      Initial Assessment (most recent)       Adult Discharge Assessment - 05/26/23 1414          Discharge Assessment    Assessment Type Discharge Planning Assessment     Confirmed/corrected address, phone number and insurance Yes     Confirmed Demographics Correct on Facesheet     Source of Information patient     Communicated REBECCA with patient/caregiver Yes     Reason For Admission Acute pancreatitis     People in Home spouse     Do you expect to return to your current living situation? Yes     Do you have help at home or someone to help you manage your care at home? Yes     Who are your  Patient calling needing a refill of norco   caregiver(s) and their phone number(s)? Shamir Huff (spouse) [phone]     Prior to hospitilization cognitive status: Alert/Oriented     Current cognitive status: Alert/Oriented     Equipment Currently Used at Home none     Readmission within 30 days? No     Patient currently being followed by outpatient case management? No     Do you currently have service(s) that help you manage your care at home? No     Do you take prescription medications? Yes     Do you have prescription coverage? Yes     Do you have any problems affording any of your prescribed medications? No     Is the patient taking medications as prescribed? yes     Who is going to help you get home at discharge? Patient's spouse will provide transportation home.     How do you get to doctors appointments? car, drives self;family or friend will provide     Are you on dialysis? No     Do you take coumadin? No     Discharge Plan A Home     Discharge Plan B Home with family     DME Needed Upon Discharge  none     Discharge Plan discussed with: Patient;Spouse/sig other     Transition of Care Barriers None                   Ching Dominique RN  Ext [phone]

## 2023-07-11 ENCOUNTER — OFFICE VISIT (OUTPATIENT)
Dept: FAMILY MEDICINE CLINIC | Facility: CLINIC | Age: 53
End: 2023-07-11
Payer: COMMERCIAL

## 2023-07-11 VITALS
SYSTOLIC BLOOD PRESSURE: 119 MMHG | WEIGHT: 315 LBS | BODY MASS INDEX: 41.75 KG/M2 | RESPIRATION RATE: 18 BRPM | HEART RATE: 63 BPM | HEIGHT: 73 IN | TEMPERATURE: 96.4 F | DIASTOLIC BLOOD PRESSURE: 73 MMHG | OXYGEN SATURATION: 94 %

## 2023-07-11 DIAGNOSIS — I25.10 CHRONIC CORONARY ARTERY DISEASE: ICD-10-CM

## 2023-07-11 DIAGNOSIS — F43.21 GRIEF REACTION: ICD-10-CM

## 2023-07-11 DIAGNOSIS — M51.36 LUMBAR DEGENERATIVE DISC DISEASE: ICD-10-CM

## 2023-07-11 DIAGNOSIS — E11.65 TYPE 2 DIABETES MELLITUS WITH HYPERGLYCEMIA, WITHOUT LONG-TERM CURRENT USE OF INSULIN: Primary | ICD-10-CM

## 2023-07-11 DIAGNOSIS — E78.2 MIXED HYPERLIPIDEMIA: ICD-10-CM

## 2023-07-11 DIAGNOSIS — I10 PRIMARY HYPERTENSION: ICD-10-CM

## 2023-07-11 PROCEDURE — 99214 OFFICE O/P EST MOD 30 MIN: CPT | Performed by: FAMILY MEDICINE

## 2023-07-11 RX ORDER — TIRZEPATIDE 5 MG/.5ML
5 INJECTION, SOLUTION SUBCUTANEOUS WEEKLY
Qty: 2 ML | Refills: 2 | Status: SHIPPED | OUTPATIENT
Start: 2023-07-11

## 2023-07-12 LAB
ALBUMIN SERPL-MCNC: 4.4 G/DL (ref 3.8–4.9)
ALBUMIN/GLOB SERPL: 2.1 {RATIO} (ref 1.2–2.2)
ALP SERPL-CCNC: 58 IU/L (ref 44–121)
ALT SERPL-CCNC: 22 IU/L (ref 0–44)
AST SERPL-CCNC: 22 IU/L (ref 0–40)
BASOPHILS # BLD AUTO: 0.1 X10E3/UL (ref 0–0.2)
BASOPHILS NFR BLD AUTO: 1 %
BILIRUB SERPL-MCNC: 0.7 MG/DL (ref 0–1.2)
BUN SERPL-MCNC: 15 MG/DL (ref 6–24)
BUN/CREAT SERPL: 14 (ref 9–20)
CALCIUM SERPL-MCNC: 9.4 MG/DL (ref 8.7–10.2)
CHLORIDE SERPL-SCNC: 104 MMOL/L (ref 96–106)
CHOLEST SERPL-MCNC: 112 MG/DL (ref 100–199)
CO2 SERPL-SCNC: 26 MMOL/L (ref 20–29)
CREAT SERPL-MCNC: 1.05 MG/DL (ref 0.76–1.27)
EGFRCR SERPLBLD CKD-EPI 2021: 84 ML/MIN/1.73
EOSINOPHIL # BLD AUTO: 0.2 X10E3/UL (ref 0–0.4)
EOSINOPHIL NFR BLD AUTO: 2 %
ERYTHROCYTE [DISTWIDTH] IN BLOOD BY AUTOMATED COUNT: 13.1 % (ref 11.6–15.4)
GLOBULIN SER CALC-MCNC: 2.1 G/DL (ref 1.5–4.5)
GLUCOSE SERPL-MCNC: 81 MG/DL (ref 70–99)
HBA1C MFR BLD: 5.7 % (ref 4.8–5.6)
HCT VFR BLD AUTO: 46.2 % (ref 37.5–51)
HDLC SERPL-MCNC: 40 MG/DL
HGB BLD-MCNC: 15.4 G/DL (ref 13–17.7)
IMM GRANULOCYTES # BLD AUTO: 0 X10E3/UL (ref 0–0.1)
IMM GRANULOCYTES NFR BLD AUTO: 0 %
LDLC SERPL CALC-MCNC: 33 MG/DL (ref 0–99)
LYMPHOCYTES # BLD AUTO: 2.2 X10E3/UL (ref 0.7–3.1)
LYMPHOCYTES NFR BLD AUTO: 28 %
MCH RBC QN AUTO: 30.8 PG (ref 26.6–33)
MCHC RBC AUTO-ENTMCNC: 33.3 G/DL (ref 31.5–35.7)
MCV RBC AUTO: 92 FL (ref 79–97)
MONOCYTES # BLD AUTO: 0.7 X10E3/UL (ref 0.1–0.9)
MONOCYTES NFR BLD AUTO: 9 %
NEUTROPHILS # BLD AUTO: 4.6 X10E3/UL (ref 1.4–7)
NEUTROPHILS NFR BLD AUTO: 60 %
PLATELET # BLD AUTO: 140 X10E3/UL (ref 150–450)
POTASSIUM SERPL-SCNC: 5.4 MMOL/L (ref 3.5–5.2)
PROT SERPL-MCNC: 6.5 G/DL (ref 6–8.5)
RBC # BLD AUTO: 5 X10E6/UL (ref 4.14–5.8)
SODIUM SERPL-SCNC: 143 MMOL/L (ref 134–144)
TRIGL SERPL-MCNC: 257 MG/DL (ref 0–149)
VLDLC SERPL CALC-MCNC: 39 MG/DL (ref 5–40)
WBC # BLD AUTO: 7.8 X10E3/UL (ref 3.4–10.8)

## 2023-08-22 ENCOUNTER — OFFICE VISIT (OUTPATIENT)
Dept: FAMILY MEDICINE CLINIC | Facility: CLINIC | Age: 53
End: 2023-08-22
Payer: COMMERCIAL

## 2023-08-22 VITALS
OXYGEN SATURATION: 94 % | SYSTOLIC BLOOD PRESSURE: 96 MMHG | DIASTOLIC BLOOD PRESSURE: 64 MMHG | RESPIRATION RATE: 20 BRPM | HEART RATE: 74 BPM | TEMPERATURE: 97.3 F | HEIGHT: 73 IN | WEIGHT: 304.8 LBS | BODY MASS INDEX: 40.39 KG/M2

## 2023-08-22 DIAGNOSIS — R05.1 ACUTE COUGH: Primary | ICD-10-CM

## 2023-08-22 DIAGNOSIS — I25.10 CHRONIC CORONARY ARTERY DISEASE: ICD-10-CM

## 2023-08-22 DIAGNOSIS — R61 DIAPHORESIS: ICD-10-CM

## 2023-08-22 DIAGNOSIS — R35.89 POLYURIA: ICD-10-CM

## 2023-08-22 PROCEDURE — 99214 OFFICE O/P EST MOD 30 MIN: CPT | Performed by: FAMILY MEDICINE

## 2023-08-22 PROCEDURE — 87428 SARSCOV & INF VIR A&B AG IA: CPT | Performed by: FAMILY MEDICINE

## 2023-08-22 PROCEDURE — 93000 ELECTROCARDIOGRAM COMPLETE: CPT | Performed by: FAMILY MEDICINE

## 2023-08-22 RX ORDER — LEVOFLOXACIN 500 MG/1
500 TABLET, FILM COATED ORAL DAILY
Qty: 7 TABLET | Refills: 0 | Status: SHIPPED | OUTPATIENT
Start: 2023-08-22

## 2023-08-22 RX ORDER — HYDROCODONE POLISTIREX AND CHLORPHENIRAMINE POLISTIREX 10; 8 MG/5ML; MG/5ML
5 SUSPENSION, EXTENDED RELEASE ORAL NIGHTLY PRN
Qty: 70 ML | Refills: 0 | Status: SHIPPED | OUTPATIENT
Start: 2023-08-22

## 2023-08-22 NOTE — PROGRESS NOTES
Subjective   Ang Yoon is a 53 y.o. male.   Chief Complaint   Patient presents with    Cough    Shortness of Breath    Fever    Headache    Generalized Body Aches       History of Present Illness   Presents to the office today       Patient Active Problem List    Diagnosis Date Noted    Type 2 diabetes mellitus with hyperglycemia, without long-term current use of insulin 09/17/2022    Cervical radiculopathy 08/17/2020    Lumbar degenerative disc disease 04/29/2020    Chronic midline low back pain with bilateral sciatica 04/29/2020    Reactive airway disease with acute exacerbation 11/01/2019    Hyperlipidemia 07/11/2019    Class 3 severe obesity due to excess calories with serious comorbidity and body mass index (BMI) of 45.0 to 49.9 in adult 07/11/2019    Dyspnea 07/02/2019    GUILLERMINA (obstructive sleep apnea) 07/02/2019    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    Hypogonadism 06/13/2018    Other problems related to lifestyle 04/24/2018    Regular astigmatism 06/01/2017    Presbyopia 06/01/2017    Myopia 06/01/2017    Chronic coronary artery disease 09/19/2012    Cardiac disease 09/19/2012    Cyst of meniscus of knee 06/22/2012    Resting tremor 03/05/2012    Degenerative joint disease 02/08/2012    Hypertension 01/18/2012    Anxiety disorder 01/18/2012    Obsessive-compulsive disorder 01/18/2012    Memory loss 01/16/2012           Past Surgical History:   Procedure Laterality Date    CARDIAC CATHETERIZATION  04/2018    KNEE ARTHROSCOPY Right 07/09/2012     Current Outpatient Medications on File Prior to Visit   Medication Sig    albuterol (PROVENTIL) (2.5 MG/3ML) 0.083% nebulizer solution Take 2.5 mg by nebulization Every 4 (Four) Hours As Needed for Wheezing.    albuterol sulfate  (90 Base) MCG/ACT inhaler Inhale 2 puffs Every 4 (Four) Hours As Needed for Wheezing.    Blood Glucose Monitoring Suppl kit Use as directed to check blood glucose    cetirizine (ZyrTEC Allergy) 10 MG tablet Take 1 tablet by  mouth Daily.    Contrave 8-90 MG tablet Take 2 tablets by mouth 2 (Two) Times a Day.    DULoxetine (CYMBALTA) 60 MG capsule Take 1 capsule by mouth Daily.    EQ Aspirin Adult Low Dose 81 MG EC tablet Take 1 tablet by mouth once daily    fluticasone (FLONASE) 50 MCG/ACT nasal spray USE 2 SPRAY(S) IN EACH NOSTRIL ONCE DAILY AS DIRECTED    glucose blood test strip Use as instructed to check blood glucose once daily    isosorbide mononitrate (IMDUR) 30 MG 24 hr tablet TAKE 1 TABLET BY MOUTH ONCE DAILY IN THE MORNING    Lancets misc Use once daily with meter and strips to check blood glucose    melatonin 5 MG tablet tablet Take 1 tablet by mouth At Night As Needed (insomnia).    metFORMIN (GLUCOPHAGE) 500 MG tablet TAKE 1 TABLET BY MOUTH TWICE DAILY WITH MEALS    naproxen sodium (ALEVE) 220 MG tablet Take 1 tablet by mouth 2 (Two) Times a Day As Needed.    nitroglycerin (Nitrostat) 0.4 MG SL tablet Place 1 tablet under the tongue Every 5 (Five) Minutes As Needed for Chest Pain. Take no more than 3 doses in 15 minutes.    propranolol (INDERAL) 80 MG tablet Take 1 tablet by mouth twice daily    rosuvastatin (CRESTOR) 20 MG tablet TAKE 1 TABLET BY MOUTH ONCE DAILY AT BEDTIME    Tirzepatide (Mounjaro) 5 MG/0.5ML solution pen-injector Inject 0.5 mL under the skin into the appropriate area as directed 1 (One) Time Per Week.    traZODone (DESYREL) 50 MG tablet TAKE 1 TO 2 TABLETS BY MOUTH EVERY DAY AT BEDTIME AS NEEDED     No current facility-administered medications on file prior to visit.     Allergies   Allergen Reactions    Niacin Rash     Social History     Socioeconomic History    Marital status: Single    Number of children: 2   Tobacco Use    Smoking status: Former     Packs/day: 0.25     Years: 3.00     Pack years: 0.75     Types: Cigarettes     Start date: 1988     Quit date: 1991     Years since quittin.6     Passive exposure: Past    Smokeless tobacco: Never    Tobacco comments:     social off and on,  "until 2002 (meaning one or two cigarettes' a week.)   Vaping Use    Vaping Use: Never used   Substance and Sexual Activity    Alcohol use: No    Drug use: No    Sexual activity: Not Currently     Family History   Problem Relation Age of Onset    Heart disease Other     Hypertension Other     Stroke Other     Prostate cancer Other     Hearing loss Mother     Asthma Brother         childhood    Asthma Daughter     Arthritis Maternal Grandmother     Arthritis Maternal Grandfather     Diabetes Maternal Grandfather     Stroke Maternal Grandfather     Heart attack Maternal Grandfather        Review of Systems    Objective   BP 96/64 (BP Location: Left arm, Patient Position: Sitting, Cuff Size: Large Adult)   Pulse 74   Temp 97.3 øF (36.3 øC) (Infrared)   Resp 20   Ht 185.4 cm (73\")   Wt (!) 138 kg (304 lb 12.8 oz)   SpO2 94%   BMI 40.21 kg/mý   Physical Exam      Office Visit on 07/11/2023   Component Date Value Ref Range Status    Hemoglobin A1C 07/11/2023 5.7 (H)  4.8 - 5.6 % Final    Comment:          Prediabetes: 5.7 - 6.4           Diabetes: >6.4           Glycemic control for adults with diabetes: <7.0      WBC 07/11/2023 7.8  3.4 - 10.8 x10E3/uL Final    **Verified by repeat analysis**    RBC 07/11/2023 5.00  4.14 - 5.80 x10E6/uL Final    Hemoglobin 07/11/2023 15.4  13.0 - 17.7 g/dL Final    Hematocrit 07/11/2023 46.2  37.5 - 51.0 % Final    MCV 07/11/2023 92  79 - 97 fL Final    MCH 07/11/2023 30.8  26.6 - 33.0 pg Final    MCHC 07/11/2023 33.3  31.5 - 35.7 g/dL Final    RDW 07/11/2023 13.1  11.6 - 15.4 % Final    Platelets 07/11/2023 140 (L)  150 - 450 x10E3/uL Final    Neutrophil Rel % 07/11/2023 60  Not Estab. % Final    Lymphocyte Rel % 07/11/2023 28  Not Estab. % Final    Monocyte Rel % 07/11/2023 9  Not Estab. % Final    Eosinophil Rel % 07/11/2023 2  Not Estab. % Final    Basophil Rel % 07/11/2023 1  Not Estab. % Final    Neutrophils Absolute 07/11/2023 4.6  1.4 - 7.0 x10E3/uL Final    Lymphocytes " Absolute 07/11/2023 2.2  0.7 - 3.1 x10E3/uL Final    Monocytes Absolute 07/11/2023 0.7  0.1 - 0.9 x10E3/uL Final    Eosinophils Absolute 07/11/2023 0.2  0.0 - 0.4 x10E3/uL Final    Basophils Absolute 07/11/2023 0.1  0.0 - 0.2 x10E3/uL Final    Immature Granulocyte Rel % 07/11/2023 0  Not Estab. % Final    Immature Grans Absolute 07/11/2023 0.0  0.0 - 0.1 x10E3/uL Final    Glucose 07/11/2023 81  70 - 99 mg/dL Final    BUN 07/11/2023 15  6 - 24 mg/dL Final    Creatinine 07/11/2023 1.05  0.76 - 1.27 mg/dL Final    EGFR Result 07/11/2023 84  >59 mL/min/1.73 Final    BUN/Creatinine Ratio 07/11/2023 14  9 - 20 Final    Sodium 07/11/2023 143  134 - 144 mmol/L Final    Potassium 07/11/2023 5.4 (H)  3.5 - 5.2 mmol/L Final    Chloride 07/11/2023 104  96 - 106 mmol/L Final    Total CO2 07/11/2023 26  20 - 29 mmol/L Final    Calcium 07/11/2023 9.4  8.7 - 10.2 mg/dL Final    Total Protein 07/11/2023 6.5  6.0 - 8.5 g/dL Final    Albumin 07/11/2023 4.4  3.8 - 4.9 g/dL Final                  **Please note reference interval change**    Globulin 07/11/2023 2.1  1.5 - 4.5 g/dL Final    A/G Ratio 07/11/2023 2.1  1.2 - 2.2 Final    Total Bilirubin 07/11/2023 0.7  0.0 - 1.2 mg/dL Final    Alkaline Phosphatase 07/11/2023 58  44 - 121 IU/L Final    AST (SGOT) 07/11/2023 22  0 - 40 IU/L Final    ALT (SGPT) 07/11/2023 22  0 - 44 IU/L Final    Total Cholesterol 07/11/2023 112  100 - 199 mg/dL Final    Triglycerides 07/11/2023 257 (H)  0 - 149 mg/dL Final    HDL Cholesterol 07/11/2023 40  >39 mg/dL Final    VLDL Cholesterol Jovanny 07/11/2023 39  5 - 40 mg/dL Final    LDL Chol Calc (NIH) 07/11/2023 33  0 - 99 mg/dL Final         Assessment & Plan   There are no diagnoses linked to this encounter.        Call with any problems or concerns before next visit       No follow-ups on file.      Much of this report is an electronic transcription of spoken language to printed text using Dragon dictation software.  As such, the subtleties and finesse of  spoken language may permit erroneous, or at times, nonsensical words or phrases to be inadvertently transcribed; thus changes may be made at a later date to rectify these errors.     Nicole Murillo MA8/22/202311:04 EDT  This note has been electronically signed

## 2023-08-22 NOTE — PROGRESS NOTES
Subjective   Ang Yoon is a 53 y.o. male.   Chief Complaint   Patient presents with    Cough    Shortness of Breath    Fever    Headache    Generalized Body Aches       History of Present Illness   Presents to the office today with illness.  Reports that he has been feeling badly for over 3 weeks.  Symptoms started off seemingly as a cold/upper respiratory infection.  Cough progressively got worse.  He would cough for hours.  Typically nonproductive.  Became very fatigued, sleepy has been spending a lot of time in bed.  He spent 3 days in bed recently.  Appetite has been down.  He has not been drinking much water.  He has been losing weight intentionally.  He has had good success with Contrave.  He has lost more weight since he has been feeling so bad.  He had fevers.  He continues to have chills, diaphoresis.  No belly pain or vomiting.  He has had some diarrhea.  He has an itchy red patch that has come up on the outside of his right lower leg.    COVID testing is negative.      Patient Active Problem List    Diagnosis Date Noted    Type 2 diabetes mellitus with hyperglycemia, without long-term current use of insulin 09/17/2022    Cervical radiculopathy 08/17/2020    Lumbar degenerative disc disease 04/29/2020    Chronic midline low back pain with bilateral sciatica 04/29/2020    Reactive airway disease with acute exacerbation 11/01/2019    Hyperlipidemia 07/11/2019    Class 3 severe obesity due to excess calories with serious comorbidity and body mass index (BMI) of 45.0 to 49.9 in adult 07/11/2019    Dyspnea 07/02/2019    GUILLERMINA (obstructive sleep apnea) 07/02/2019    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    Hypogonadism 06/13/2018    Other problems related to lifestyle 04/24/2018    Regular astigmatism 06/01/2017    Presbyopia 06/01/2017    Myopia 06/01/2017    Chronic coronary artery disease 09/19/2012    Cardiac disease 09/19/2012    Cyst of meniscus of knee 06/22/2012    Resting tremor 03/05/2012     Degenerative joint disease 02/08/2012    Hypertension 01/18/2012    Anxiety disorder 01/18/2012    Obsessive-compulsive disorder 01/18/2012    Memory loss 01/16/2012           Past Surgical History:   Procedure Laterality Date    CARDIAC CATHETERIZATION  04/2018    KNEE ARTHROSCOPY Right 07/09/2012     Current Outpatient Medications on File Prior to Visit   Medication Sig    albuterol (PROVENTIL) (2.5 MG/3ML) 0.083% nebulizer solution Take 2.5 mg by nebulization Every 4 (Four) Hours As Needed for Wheezing.    albuterol sulfate  (90 Base) MCG/ACT inhaler Inhale 2 puffs Every 4 (Four) Hours As Needed for Wheezing.    Blood Glucose Monitoring Suppl kit Use as directed to check blood glucose    cetirizine (ZyrTEC Allergy) 10 MG tablet Take 1 tablet by mouth Daily.    Contrave 8-90 MG tablet Take 2 tablets by mouth 2 (Two) Times a Day.    DULoxetine (CYMBALTA) 60 MG capsule Take 1 capsule by mouth Daily.    EQ Aspirin Adult Low Dose 81 MG EC tablet Take 1 tablet by mouth once daily    fluticasone (FLONASE) 50 MCG/ACT nasal spray USE 2 SPRAY(S) IN EACH NOSTRIL ONCE DAILY AS DIRECTED    glucose blood test strip Use as instructed to check blood glucose once daily    isosorbide mononitrate (IMDUR) 30 MG 24 hr tablet TAKE 1 TABLET BY MOUTH ONCE DAILY IN THE MORNING    Lancets misc Use once daily with meter and strips to check blood glucose    melatonin 5 MG tablet tablet Take 1 tablet by mouth At Night As Needed (insomnia).    metFORMIN (GLUCOPHAGE) 500 MG tablet TAKE 1 TABLET BY MOUTH TWICE DAILY WITH MEALS    naproxen sodium (ALEVE) 220 MG tablet Take 1 tablet by mouth 2 (Two) Times a Day As Needed.    nitroglycerin (Nitrostat) 0.4 MG SL tablet Place 1 tablet under the tongue Every 5 (Five) Minutes As Needed for Chest Pain. Take no more than 3 doses in 15 minutes.    propranolol (INDERAL) 80 MG tablet Take 1 tablet by mouth twice daily    rosuvastatin (CRESTOR) 20 MG tablet TAKE 1 TABLET BY MOUTH ONCE DAILY AT  "BEDTIME    Tirzepatide (Mounjaro) 5 MG/0.5ML solution pen-injector Inject 0.5 mL under the skin into the appropriate area as directed 1 (One) Time Per Week.    traZODone (DESYREL) 50 MG tablet TAKE 1 TO 2 TABLETS BY MOUTH EVERY DAY AT BEDTIME AS NEEDED     No current facility-administered medications on file prior to visit.     Allergies   Allergen Reactions    Niacin Rash     Social History     Socioeconomic History    Marital status: Single    Number of children: 2   Tobacco Use    Smoking status: Former     Packs/day: 0.25     Years: 3.00     Pack years: 0.75     Types: Cigarettes     Start date: 1988     Quit date: 1991     Years since quittin.6     Passive exposure: Past    Smokeless tobacco: Never    Tobacco comments:     social off and on, until  (meaning one or two cigarettes' a week.)   Vaping Use    Vaping Use: Never used   Substance and Sexual Activity    Alcohol use: No    Drug use: No    Sexual activity: Not Currently     Family History   Problem Relation Age of Onset    Heart disease Other     Hypertension Other     Stroke Other     Prostate cancer Other     Hearing loss Mother     Asthma Brother         childhood    Asthma Daughter     Arthritis Maternal Grandmother     Arthritis Maternal Grandfather     Diabetes Maternal Grandfather     Stroke Maternal Grandfather     Heart attack Maternal Grandfather        Review of Systems    Objective   BP 96/64 (BP Location: Left arm, Patient Position: Sitting, Cuff Size: Large Adult)   Pulse 74   Temp 97.3 øF (36.3 øC) (Infrared)   Resp 20   Ht 185.4 cm (73\")   Wt (!) 138 kg (304 lb 12.8 oz)   SpO2 94%   BMI 40.21 kg/mý   Physical Exam  Constitutional:       Appearance: He is well-developed. He is ill-appearing and diaphoretic.      Comments:      HENT:      Head: Normocephalic and atraumatic.   Eyes:      Conjunctiva/sclera: Conjunctivae normal.   Cardiovascular:      Rate and Rhythm: Normal rate.   Pulmonary:      Effort: Pulmonary " effort is normal.      Comments: He has extreme paroxysms of cough in response to a deep breath.  Breath sounds are decreased at both bases, but no obvious crackles, wheezes or rhonchi.  Musculoskeletal:         General: Normal range of motion.      Cervical back: Normal range of motion.   Skin:     General: Skin is warm and dry.      Coloration: Skin is pale. Skin is not jaundiced.      Findings: No rash.   Neurological:      Mental Status: He is alert and oriented to person, place, and time.      Gait: Gait normal.   Psychiatric:         Mood and Affect: Mood normal.         Behavior: Behavior normal.           ECG 12 Lead    Date/Time: 8/22/2023 11:31 AM  Performed by: Traci Bass MD  Authorized by: Traci Bass MD   Comparison: compared with previous ECG from 7/12/2021  Similar to previous ECG  Comparison to previous ECG: EKG in 2021 suggested lateral ischemia as well due to Q waves.  Rhythm: sinus rhythm  Rate: normal  BPM: 65  Conduction: conduction normal  Q waves: I, aVL, V5 and V6    ST Segments: ST segments normal  T Waves: T waves normal  QRS axis: normal  Other: no other findings    Clinical impression: abnormal EKG            Assessment & Plan   Diagnoses and all orders for this visit:    1. Acute cough (Primary)  -     POCT SARS-CoV-2 Antigen YUMIKO + Flu  -     XR Chest PA & Lateral  -     levoFLOXacin (Levaquin) 500 MG tablet; Take 1 tablet by mouth Daily.  Dispense: 7 tablet; Refill: 0  -     Hydrocod Joaquin-Chlorphe Joaquin ER (TUSSIONEX PENNKINETIC) 10-8 MG/5ML ER suspension; Take 5 mL by mouth At Night As Needed for Cough.  Dispense: 70 mL; Refill: 0    2. Chronic coronary artery disease  -     ECG 12 Lead    3. Polyuria    4. Diaphoresis    In office COVID/influenza test is negative.  Given the duration of his symptoms, I cannot say for sure he did not have COVID and now has pneumonia.  We did a chest x-ray in the office and it looks like he has a right lower lobe infiltrate.  I did  an EKG which was essentially unchanged from a previous EKG in 2021.  I am going to treat him as community-acquired pneumonia with Levaquin once daily for 1 week.  Treat the severe cough with Tussionex cough syrup.  Recommend Mucinex twice daily with lots of water to help with expectoration.  Follow-up here again in 1 week to reevaluate.  At 1 point he mentioned that he had to get up from bed to urinate multiple times per day.  Urinalysis ordered today, but he was not able to leave a urine sample.  Of asked him to let me know if symptoms worsen or fail to improve.  I want him to stop his metformin until I next evaluate him.  Blood pressure is borderline today at 96/64.  I am hesitant to decrease his propranolol since his heart rate is controlled.  He has not had any chest pains, pressure other than the chest hurting after coughing.  His EKG is unchanged I do not think this represents angina.          Call with any problems or concerns before next visit       Return in about 1 week (around 8/29/2023) for Recheck illness.      Much of this report is an electronic transcription of spoken language to printed text using Dragon dictation software.  As such, the subtleties and finesse of spoken language may permit erroneous, or at times, nonsensical words or phrases to be inadvertently transcribed; thus changes may be made at a later date to rectify these errors.     Traci Bass MD8/22/202312:43 EDT  This note has been electronically signed

## 2023-08-30 RX ORDER — ISOSORBIDE MONONITRATE 30 MG/1
TABLET, EXTENDED RELEASE ORAL
Qty: 90 TABLET | Refills: 3 | Status: SHIPPED | OUTPATIENT
Start: 2023-08-30

## 2023-09-22 DIAGNOSIS — E11.65 TYPE 2 DIABETES MELLITUS WITH HYPERGLYCEMIA, WITHOUT LONG-TERM CURRENT USE OF INSULIN: ICD-10-CM

## 2023-09-22 RX ORDER — TIRZEPATIDE 7.5 MG/.5ML
7.5 INJECTION, SOLUTION SUBCUTANEOUS
Qty: 2 ML | Refills: 0 | Status: SHIPPED | OUTPATIENT
Start: 2023-09-22

## 2023-09-29 DIAGNOSIS — G89.29 CHRONIC MIDLINE LOW BACK PAIN WITH BILATERAL SCIATICA: ICD-10-CM

## 2023-09-29 DIAGNOSIS — M54.42 CHRONIC MIDLINE LOW BACK PAIN WITH BILATERAL SCIATICA: ICD-10-CM

## 2023-09-29 DIAGNOSIS — M54.41 CHRONIC MIDLINE LOW BACK PAIN WITH BILATERAL SCIATICA: ICD-10-CM

## 2023-09-29 RX ORDER — ALBUTEROL SULFATE 2.5 MG/3ML
SOLUTION RESPIRATORY (INHALATION)
Qty: 300 ML | Refills: 1 | Status: SHIPPED | OUTPATIENT
Start: 2023-09-29

## 2023-09-29 RX ORDER — DULOXETIN HYDROCHLORIDE 60 MG/1
60 CAPSULE, DELAYED RELEASE ORAL DAILY
Qty: 90 CAPSULE | Refills: 1 | Status: SHIPPED | OUTPATIENT
Start: 2023-09-29

## 2023-10-26 DIAGNOSIS — E78.2 MIXED HYPERLIPIDEMIA: ICD-10-CM

## 2023-10-26 DIAGNOSIS — E11.65 TYPE 2 DIABETES MELLITUS WITH HYPERGLYCEMIA, WITHOUT LONG-TERM CURRENT USE OF INSULIN: ICD-10-CM

## 2023-10-26 DIAGNOSIS — E11.65 TYPE 2 DIABETES MELLITUS WITH HYPERGLYCEMIA, WITHOUT LONG-TERM CURRENT USE OF INSULIN: Primary | ICD-10-CM

## 2023-10-26 RX ORDER — ROSUVASTATIN CALCIUM 20 MG/1
TABLET, COATED ORAL
Qty: 90 TABLET | Refills: 3 | Status: SHIPPED | OUTPATIENT
Start: 2023-10-26

## 2023-10-26 RX ORDER — PROPRANOLOL HYDROCHLORIDE 80 MG/1
TABLET ORAL
Qty: 180 TABLET | Refills: 3 | Status: SHIPPED | OUTPATIENT
Start: 2023-10-26

## 2023-10-26 RX ORDER — TIRZEPATIDE 10 MG/.5ML
10 INJECTION, SOLUTION SUBCUTANEOUS
Qty: 2 ML | Refills: 0 | Status: SHIPPED | OUTPATIENT
Start: 2023-10-26

## 2023-10-26 RX ORDER — TIRZEPATIDE 7.5 MG/.5ML
INJECTION, SOLUTION SUBCUTANEOUS
Qty: 4 ML | Refills: 0 | OUTPATIENT
Start: 2023-10-26

## 2023-10-30 DIAGNOSIS — E11.65 TYPE 2 DIABETES MELLITUS WITH HYPERGLYCEMIA, WITHOUT LONG-TERM CURRENT USE OF INSULIN: ICD-10-CM

## 2023-10-30 RX ORDER — TIRZEPATIDE 7.5 MG/.5ML
INJECTION, SOLUTION SUBCUTANEOUS
Qty: 4 ML | Refills: 0 | OUTPATIENT
Start: 2023-10-30

## 2023-11-20 DIAGNOSIS — E11.65 TYPE 2 DIABETES MELLITUS WITH HYPERGLYCEMIA, WITHOUT LONG-TERM CURRENT USE OF INSULIN: ICD-10-CM

## 2023-11-20 RX ORDER — TIRZEPATIDE 10 MG/.5ML
INJECTION, SOLUTION SUBCUTANEOUS
Qty: 4 ML | Refills: 0 | Status: SHIPPED | OUTPATIENT
Start: 2023-11-20

## 2023-11-27 ENCOUNTER — TELEPHONE (OUTPATIENT)
Dept: FAMILY MEDICINE CLINIC | Facility: CLINIC | Age: 53
End: 2023-11-27
Payer: COMMERCIAL

## 2023-11-27 NOTE — TELEPHONE ENCOUNTER
Called patient vm full.    RELAY:  I called [facility] and they said medicine doesn't need PA it is being denied because insurance isnt working bc the premium wasn't paid.

## 2023-11-27 NOTE — TELEPHONE ENCOUNTER
Pt is calling to find out status of the PA for the Mounjaro.  I do not see where this has been approved or denied.  Please advise.

## 2023-12-20 DIAGNOSIS — E11.65 TYPE 2 DIABETES MELLITUS WITH HYPERGLYCEMIA, WITHOUT LONG-TERM CURRENT USE OF INSULIN: ICD-10-CM

## 2023-12-21 RX ORDER — TIRZEPATIDE 10 MG/.5ML
INJECTION, SOLUTION SUBCUTANEOUS
Qty: 4 ML | Refills: 0 | Status: SHIPPED | OUTPATIENT
Start: 2023-12-21

## 2023-12-26 ENCOUNTER — TELEPHONE (OUTPATIENT)
Dept: FAMILY MEDICINE CLINIC | Facility: CLINIC | Age: 53
End: 2023-12-26
Payer: COMMERCIAL

## 2023-12-26 NOTE — TELEPHONE ENCOUNTER
Caller: Ang Yoon    Relationship: Self    Best call back number: [phone]     Who are you requesting to speak with (clinical staff, provider,  specific staff member): CLINICAL    What was the call regarding: PATIENT WANTS TO SCHEDULE AN APPOINTMENT WITH DR. TRAN TO DISCUSS MEDICATIONS AND HAVE HIM LOOK AT THE RASH ON HIS FACE. DR. CHRISTIE NEXT OFFICE VISIT IS ON MARCH 20. PATIENT IS REQUESTING TO KNOW IF DR. TRAN WANTS HIM TO WAIT TO SEE HIM UNTIL MARCH OR IF HE SHOULD SCHEDULE WITH A DIFFERENT PROVIDER. PATIENT PREFERS TO SEE DR. TRAN AND WOULD LIKE TO KNOW WHAT HE RECOMMENDS FOR HIM TO DO     PLEASE ADVISE

## 2024-01-08 ENCOUNTER — OFFICE VISIT (OUTPATIENT)
Dept: FAMILY MEDICINE CLINIC | Facility: CLINIC | Age: 54
End: 2024-01-08
Payer: COMMERCIAL

## 2024-01-08 VITALS
TEMPERATURE: 96.9 F | BODY MASS INDEX: 37.59 KG/M2 | SYSTOLIC BLOOD PRESSURE: 133 MMHG | DIASTOLIC BLOOD PRESSURE: 88 MMHG | HEIGHT: 73 IN | RESPIRATION RATE: 18 BRPM | WEIGHT: 283.6 LBS | OXYGEN SATURATION: 98 % | HEART RATE: 78 BPM

## 2024-01-08 DIAGNOSIS — E78.2 MIXED HYPERLIPIDEMIA: ICD-10-CM

## 2024-01-08 DIAGNOSIS — G89.29 CHRONIC MIDLINE LOW BACK PAIN WITH BILATERAL SCIATICA: ICD-10-CM

## 2024-01-08 DIAGNOSIS — I25.10 CHRONIC CORONARY ARTERY DISEASE: ICD-10-CM

## 2024-01-08 DIAGNOSIS — M54.42 CHRONIC MIDLINE LOW BACK PAIN WITH BILATERAL SCIATICA: ICD-10-CM

## 2024-01-08 DIAGNOSIS — G47.33 OSA (OBSTRUCTIVE SLEEP APNEA): ICD-10-CM

## 2024-01-08 DIAGNOSIS — M54.41 CHRONIC MIDLINE LOW BACK PAIN WITH BILATERAL SCIATICA: ICD-10-CM

## 2024-01-08 DIAGNOSIS — Z23 NEED FOR IMMUNIZATION AGAINST INFLUENZA: ICD-10-CM

## 2024-01-08 DIAGNOSIS — E11.65 TYPE 2 DIABETES MELLITUS WITH HYPERGLYCEMIA, WITHOUT LONG-TERM CURRENT USE OF INSULIN: ICD-10-CM

## 2024-01-08 DIAGNOSIS — L21.9 SEBORRHEIC DERMATITIS: ICD-10-CM

## 2024-01-08 DIAGNOSIS — I10 PRIMARY HYPERTENSION: Primary | ICD-10-CM

## 2024-01-08 RX ORDER — CLOBETASOL PROPIONATE 0.46 MG/ML
SOLUTION TOPICAL 2 TIMES DAILY
Qty: 50 ML | Refills: 1 | Status: SHIPPED | OUTPATIENT
Start: 2024-01-08

## 2024-01-08 NOTE — PROGRESS NOTES
"Subjective   Ang Yoon is a 53 y.o. male.   Chief Complaint   Patient presents with    Rash    Weight Loss    Diabetes    Coronary Artery Disease       History of Present Illness   Presents to the office today for what the schedule says is \"discuss medication and rash on face.\"  I last saw him for an acute illness back in August.  Was lost to follow-up after that prior to that I saw him in July 2023 for follow-up on his diabetes.    Medication in question-had to stop Contrave 6 weeks ago.  Weight is up a little bit from that.  Still down at 283 pounds.  At his highest he was 360 pounds in April 2022.  Still working out with his son 3 days a week.    Rash-areas of pruritic, red, flaky skin around the beard line and hairline.    Last lab work to follow his diabetes and cholesterol was back in July of last year.  His A1c by then was excellent at 5.7%.  Lipid panel was excellent.  CMP was normal, CBC was normal except for a slightly low platelet count of 140,000.    No blood sugar numbers to report today.    Blood pressure is good at 133/88.    Coronary artery disease-he has not seen a cardiologist in 4 years.  He has had to use nitroglycerin twice recently for pain and pressure in his chest.    Obstructive sleep apnea-does continue to use CPAP.        Patient Active Problem List    Diagnosis Date Noted    Other insomnia 01/28/2024    Type 2 diabetes mellitus with hyperglycemia, without long-term current use of insulin 09/17/2022    Cervical radiculopathy 08/17/2020    Lumbar degenerative disc disease 04/29/2020    Chronic midline low back pain with bilateral sciatica 04/29/2020    Reactive airway disease with acute exacerbation 11/01/2019    Hyperlipidemia 07/11/2019    Class 3 severe obesity due to excess calories with serious comorbidity and body mass index (BMI) of 45.0 to 49.9 in adult 07/11/2019    Dyspnea 07/02/2019    GUILLERMINA (obstructive sleep apnea) 07/02/2019    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    " Hypogonadism 06/13/2018    Other problems related to lifestyle 04/24/2018    Regular astigmatism 06/01/2017    Presbyopia 06/01/2017    Myopia 06/01/2017    Chronic coronary artery disease 09/19/2012    Cardiac disease 09/19/2012    Cyst of meniscus of knee 06/22/2012    Resting tremor 03/05/2012    Degenerative joint disease 02/08/2012    Hypertension 01/18/2012    Anxiety disorder 01/18/2012    Obsessive-compulsive disorder 01/18/2012    Memory loss 01/16/2012           Past Surgical History:   Procedure Laterality Date    CARDIAC CATHETERIZATION  04/2018    KNEE ARTHROSCOPY Right 07/09/2012     Current Outpatient Medications on File Prior to Visit   Medication Sig    albuterol (PROVENTIL) (2.5 MG/3ML) 0.083% nebulizer solution USE 1 VIAL IN NEBULIZER EVERY 4 HOURS AS NEEDED FOR WHEEZING    albuterol sulfate  (90 Base) MCG/ACT inhaler Inhale 2 puffs Every 4 (Four) Hours As Needed for Wheezing.    cetirizine (ZyrTEC Allergy) 10 MG tablet Take 1 tablet by mouth Daily.    DULoxetine (CYMBALTA) 60 MG capsule Take 1 capsule by mouth once daily    EQ Aspirin Adult Low Dose 81 MG EC tablet Take 1 tablet by mouth once daily    fluticasone (FLONASE) 50 MCG/ACT nasal spray USE 2 SPRAY(S) IN EACH NOSTRIL ONCE DAILY AS DIRECTED    isosorbide mononitrate (IMDUR) 30 MG 24 hr tablet TAKE 1 TABLET BY MOUTH ONCE DAILY IN THE MORNING    melatonin 5 MG tablet tablet Take 1 tablet by mouth At Night As Needed (insomnia).    naproxen sodium (ALEVE) 220 MG tablet Take 1 tablet by mouth 2 (Two) Times a Day As Needed.    propranolol (INDERAL) 80 MG tablet Take 1 tablet by mouth twice daily    rosuvastatin (CRESTOR) 20 MG tablet TAKE 1 TABLET BY MOUTH ONCE DAILY AT BEDTIME    traZODone (DESYREL) 50 MG tablet TAKE 1 TO 2 TABLETS BY MOUTH EVERY DAY AT BEDTIME AS NEEDED    Blood Glucose Monitoring Suppl kit Use as directed to check blood glucose (Patient not taking: Reported on 1/8/2024)    glucose blood test strip Use as instructed to  "check blood glucose once daily (Patient not taking: Reported on 2024)    Lancets misc Use once daily with meter and strips to check blood glucose (Patient not taking: Reported on 2024)    nitroglycerin (Nitrostat) 0.4 MG SL tablet Place 1 tablet under the tongue Every 5 (Five) Minutes As Needed for Chest Pain. Take no more than 3 doses in 15 minutes.     No current facility-administered medications on file prior to visit.     Allergies   Allergen Reactions    Niacin Rash     Social History     Socioeconomic History    Marital status: Single    Number of children: 2   Tobacco Use    Smoking status: Former     Packs/day: 0.25     Years: 3.00     Additional pack years: 0.00     Total pack years: 0.75     Types: Cigarettes     Start date: 1988     Quit date: 1991     Years since quittin.0     Passive exposure: Past    Smokeless tobacco: Never    Tobacco comments:     social off and on, until  (meaning one or two cigarettes' a week.)   Vaping Use    Vaping Use: Never used   Substance and Sexual Activity    Alcohol use: No    Drug use: No    Sexual activity: Not Currently     Family History   Problem Relation Age of Onset    Heart disease Other     Hypertension Other     Stroke Other     Prostate cancer Other     Hearing loss Mother     Asthma Brother         childhood    Asthma Daughter     Arthritis Maternal Grandmother     Arthritis Maternal Grandfather     Diabetes Maternal Grandfather     Stroke Maternal Grandfather     Heart attack Maternal Grandfather        Review of Systems    Objective   /88 (BP Location: Left arm, Patient Position: Sitting, Cuff Size: Large Adult)   Pulse 78   Temp 96.9 °F (36.1 °C) (Infrared)   Resp 18   Ht 185.4 cm (73\")   Wt 129 kg (283 lb 9.6 oz)   SpO2 98%   BMI 37.42 kg/m²   Physical Exam  Constitutional:       Appearance: He is well-developed.      Comments:      HENT:      Head: Normocephalic and atraumatic.   Eyes:      Conjunctiva/sclera: " Conjunctivae normal.   Cardiovascular:      Rate and Rhythm: Normal rate.   Pulmonary:      Effort: Pulmonary effort is normal.   Musculoskeletal:         General: Normal range of motion.      Cervical back: Normal range of motion.   Skin:     General: Skin is warm and dry.      Findings: Rash present.      Comments: Seborrheic dermatitis around the hairline and beard line   Neurological:      Mental Status: He is alert and oriented to person, place, and time.   Psychiatric:         Behavior: Behavior normal.            Assessment & Plan   Diagnoses and all orders for this visit:    1. Primary hypertension (Primary)    2. Mixed hyperlipidemia    3. Type 2 diabetes mellitus with hyperglycemia, without long-term current use of insulin  -     Tirzepatide (MOUNJARO) 12.5 MG/0.5ML solution pen-injector pen; Inject 0.5 mL under the skin into the appropriate area as directed 1 (One) Time Per Week.  Dispense: 2 mL; Refill: 0  -     Hemoglobin A1c  -     Comprehensive Metabolic Panel    4. Chronic coronary artery disease  -     Ambulatory Referral to Cardiology  -     CBC & Differential    5. Chronic midline low back pain with bilateral sciatica    6. GUILLERMINA (obstructive sleep apnea)    7. Need for immunization against influenza  -     Fluzone (or Fluarix & Flulaval for VFC) >6mos    8. Seborrheic dermatitis  -     clobetasol (TEMOVATE) 0.05 % external solution; Apply  topically to the appropriate area as directed 2 (Two) Times a Day.  Dispense: 50 mL; Refill: 1    Status of multiple issues reviewed today.  Hypertensions chronic problem currently under good control.  Continue Inderal, Imdur at current doses.  Will make a referral to cardiology as I agree with him, he should see a cardiologist.  He does have a current prescription for sublingual nitroglycerin and his Imdur as above.  Refill Mounjaro.  He is due for that.  Check an A1c to monitor status of his diabetes.  Last lipid panel is been less than a year ago.  He is on  high-dose statin-20 mg of Crestor per day continue that at current dose.  Continue CPAP.  Back pains are stable.  The weight loss has been helped, as has the exercise.  Treat the seborrheic dermatitis with clobetasol solution.  Can stop it when the rash resolves.  Restart it as needed.  I will follow-up with him when his blood tests are back.  Will make any changes as needed.  Will plan to see him again in 3 months and continue to follow his chronic medical problems as above.        Call with any problems or concerns before next visit       Return in about 3 months (around 4/8/2024).      Much of this report is an electronic transcription of spoken language to printed text using Dragon dictation software.  As such, the subtleties and finesse of spoken language may permit erroneous, or at times, nonsensical words or phrases to be inadvertently transcribed; thus changes may be made at a later date to rectify these errors.     Traci Bass MD1/28/202421:20 EST  This note has been electronically signed

## 2024-01-09 LAB
ALBUMIN SERPL-MCNC: 4.6 G/DL (ref 3.8–4.9)
ALBUMIN/GLOB SERPL: 2.1 {RATIO} (ref 1.2–2.2)
ALP SERPL-CCNC: 53 IU/L (ref 44–121)
ALT SERPL-CCNC: 14 IU/L (ref 0–44)
AST SERPL-CCNC: 19 IU/L (ref 0–40)
BASOPHILS # BLD AUTO: 0.1 X10E3/UL (ref 0–0.2)
BASOPHILS NFR BLD AUTO: 1 %
BILIRUB SERPL-MCNC: 0.6 MG/DL (ref 0–1.2)
BUN SERPL-MCNC: 14 MG/DL (ref 6–24)
BUN/CREAT SERPL: 16 (ref 9–20)
CALCIUM SERPL-MCNC: 9.7 MG/DL (ref 8.7–10.2)
CHLORIDE SERPL-SCNC: 103 MMOL/L (ref 96–106)
CO2 SERPL-SCNC: 26 MMOL/L (ref 20–29)
CREAT SERPL-MCNC: 0.88 MG/DL (ref 0.76–1.27)
EGFRCR SERPLBLD CKD-EPI 2021: 102 ML/MIN/1.73
EOSINOPHIL # BLD AUTO: 0.2 X10E3/UL (ref 0–0.4)
EOSINOPHIL NFR BLD AUTO: 2 %
ERYTHROCYTE [DISTWIDTH] IN BLOOD BY AUTOMATED COUNT: 13 % (ref 11.6–15.4)
GLOBULIN SER CALC-MCNC: 2.2 G/DL (ref 1.5–4.5)
GLUCOSE SERPL-MCNC: 86 MG/DL (ref 70–99)
HBA1C MFR BLD: 5.4 % (ref 4.8–5.6)
HCT VFR BLD AUTO: 48.5 % (ref 37.5–51)
HGB BLD-MCNC: 16.4 G/DL (ref 13–17.7)
IMM GRANULOCYTES # BLD AUTO: 0 X10E3/UL (ref 0–0.1)
IMM GRANULOCYTES NFR BLD AUTO: 0 %
LYMPHOCYTES # BLD AUTO: 1.9 X10E3/UL (ref 0.7–3.1)
LYMPHOCYTES NFR BLD AUTO: 25 %
MCH RBC QN AUTO: 31.3 PG (ref 26.6–33)
MCHC RBC AUTO-ENTMCNC: 33.8 G/DL (ref 31.5–35.7)
MCV RBC AUTO: 93 FL (ref 79–97)
MONOCYTES # BLD AUTO: 0.5 X10E3/UL (ref 0.1–0.9)
MONOCYTES NFR BLD AUTO: 7 %
NEUTROPHILS # BLD AUTO: 4.9 X10E3/UL (ref 1.4–7)
NEUTROPHILS NFR BLD AUTO: 65 %
PLATELET # BLD AUTO: 141 X10E3/UL (ref 150–450)
POTASSIUM SERPL-SCNC: 5.2 MMOL/L (ref 3.5–5.2)
PROT SERPL-MCNC: 6.8 G/DL (ref 6–8.5)
RBC # BLD AUTO: 5.24 X10E6/UL (ref 4.14–5.8)
SODIUM SERPL-SCNC: 145 MMOL/L (ref 134–144)
WBC # BLD AUTO: 7.6 X10E3/UL (ref 3.4–10.8)

## 2024-01-28 PROBLEM — G47.09 OTHER INSOMNIA: Status: ACTIVE | Noted: 2024-01-28

## 2024-02-01 DIAGNOSIS — G47.09 OTHER INSOMNIA: ICD-10-CM

## 2024-02-01 DIAGNOSIS — J45.41 MODERATE PERSISTENT REACTIVE AIRWAY DISEASE WITH ACUTE EXACERBATION: ICD-10-CM

## 2024-02-01 DIAGNOSIS — M54.41 CHRONIC MIDLINE LOW BACK PAIN WITH BILATERAL SCIATICA: ICD-10-CM

## 2024-02-01 DIAGNOSIS — M54.42 CHRONIC MIDLINE LOW BACK PAIN WITH BILATERAL SCIATICA: ICD-10-CM

## 2024-02-01 DIAGNOSIS — R06.02 SHORTNESS OF BREATH: ICD-10-CM

## 2024-02-01 DIAGNOSIS — G89.29 CHRONIC MIDLINE LOW BACK PAIN WITH BILATERAL SCIATICA: ICD-10-CM

## 2024-02-01 DIAGNOSIS — E78.2 MIXED HYPERLIPIDEMIA: ICD-10-CM

## 2024-02-01 DIAGNOSIS — E11.65 TYPE 2 DIABETES MELLITUS WITH HYPERGLYCEMIA, WITHOUT LONG-TERM CURRENT USE OF INSULIN: ICD-10-CM

## 2024-02-01 RX ORDER — TRAZODONE HYDROCHLORIDE 50 MG/1
TABLET ORAL
Qty: 60 TABLET | Refills: 5 | Status: SHIPPED | OUTPATIENT
Start: 2024-02-01

## 2024-02-01 RX ORDER — ALBUTEROL SULFATE 90 UG/1
2 AEROSOL, METERED RESPIRATORY (INHALATION) EVERY 4 HOURS PRN
Qty: 18 G | Refills: 3 | Status: SHIPPED | OUTPATIENT
Start: 2024-02-01

## 2024-02-01 RX ORDER — ROSUVASTATIN CALCIUM 20 MG/1
20 TABLET, COATED ORAL
Qty: 90 TABLET | Refills: 3 | Status: SHIPPED | OUTPATIENT
Start: 2024-02-01

## 2024-02-01 RX ORDER — DULOXETIN HYDROCHLORIDE 60 MG/1
60 CAPSULE, DELAYED RELEASE ORAL DAILY
Qty: 90 CAPSULE | Refills: 3 | Status: SHIPPED | OUTPATIENT
Start: 2024-02-01

## 2024-02-01 NOTE — TELEPHONE ENCOUNTER
Caller: EmersonAng panda    Relationship: Self    Best call back number:     [phone]       Requested Prescriptions:   Requested Prescriptions     Pending Prescriptions Disp Refills    Tirzepatide (MOUNJARO) 12.5 MG/0.5ML solution pen-injector pen 2 mL 0     Sig: Inject 0.5 mL under the skin into the appropriate area as directed 1 (One) Time Per Week.    rosuvastatin (CRESTOR) 20 MG tablet 90 tablet 3     Sig: Take 1 tablet by mouth every night at bedtime.    albuterol sulfate  (90 Base) MCG/ACT inhaler 18 g 0     Sig: Inhale 2 puffs Every 4 (Four) Hours As Needed for Wheezing.        Pharmacy where request should be sent: [facility] [address] [place], [place] [id number]     Last office visit with prescribing clinician: 1/8/2024   Last telemedicine visit with prescribing clinician: Visit date not found   Next office visit with prescribing clinician: 4/9/2024     Additional details provided by patient:   PRIOR AUTHORIZATION ON TIRZEPATIDE  NEW PRESCRIPTIONS ON ROSUVASTATIN AND ALBUTEROL    Does the patient have less than a 3 day supply:  [] Yes  [] No    Would you like a call back once the refill request has been completed: [] Yes [] No    If the office needs to give you a call back, can they leave a voicemail: [] Yes [] No    Yevgeniy Camilo   02/01/24 15:36 EST             Wheelchair/Stroller

## 2024-02-02 ENCOUNTER — TELEPHONE (OUTPATIENT)
Dept: FAMILY MEDICINE CLINIC | Facility: CLINIC | Age: 54
End: 2024-02-02
Payer: MEDICARE

## 2024-02-02 NOTE — TELEPHONE ENCOUNTER
What is the question for me?  He has been on Mounjaro for months.  I do not see anything in the chart indicating it is not covered anymore.  He was on Trulicity once before and cannot tolerate it, for what that is worth.  Let me know what you know about this situation.  Thanks!

## 2024-02-02 NOTE — TELEPHONE ENCOUNTER
Spoke with patient and advised that it has been denied. He was not happy when I told him to call the insurance to see what they would pay for. He states [facility] told him they suggested him to go back on the trulicity but was told to come off of it due to side effects. Please advise.

## 2024-02-02 NOTE — TELEPHONE ENCOUNTER
Caller: Ang Yoon    Relationship: Self    Best call back number:   [phone]   What form or medical record are you requesting: PRIOR AUTHORIZATION FOR   Tirzepatide (MOUNJARO) 15 MG/0.5ML solution pen-injector pen  15 mg, Weekly       Who is requesting this form or medical record from you: [employer] (CHECKED WITH FRONT OFFICE AS TO ACCEPTANCE OF THIS PAYER)    Timeframe paperwork needed: ASAP

## 2024-02-05 NOTE — TELEPHONE ENCOUNTER
Patient called wanting to know status of his PA.  He also states he got a text from [employer] or something saying that Dr. Bass wants to share his prescription information with them.  I told him I had never heard of this and not to reply.

## 2024-02-05 NOTE — TELEPHONE ENCOUNTER
SPOKE WITH PATIENT INSURANCE HAS DENIED THE MEDICATION FOR A SECOND TIME WE ARE WAITING TO SEE WHAT THE REASON WHY. PATIENT STATES HE IS GOING TO CALL THE INSURANCE COMPANY AGAIN AND SPEAK WITH THEM AND LET THEM KNOW THIS HAS BEEN HELPING HIM

## 2024-02-05 NOTE — TELEPHONE ENCOUNTER
I redone the pa for this medication this morning. He has gotten new insurance and they are not wanting to pay for it. I had sent the records to them.

## 2024-02-07 ENCOUNTER — TELEPHONE (OUTPATIENT)
Dept: FAMILY MEDICINE CLINIC | Facility: CLINIC | Age: 54
End: 2024-02-07
Payer: COMMERCIAL

## 2024-02-09 ENCOUNTER — TELEPHONE (OUTPATIENT)
Dept: FAMILY MEDICINE CLINIC | Facility: CLINIC | Age: 54
End: 2024-02-09
Payer: MEDICARE

## 2024-02-09 DIAGNOSIS — E11.65 TYPE 2 DIABETES MELLITUS WITH HYPERGLYCEMIA, WITHOUT LONG-TERM CURRENT USE OF INSULIN: Primary | ICD-10-CM

## 2024-02-09 RX ORDER — GLIMEPIRIDE 1 MG/1
1 TABLET ORAL
Qty: 30 TABLET | Refills: 2 | Status: SHIPPED | OUTPATIENT
Start: 2024-02-09

## 2024-02-09 NOTE — TELEPHONE ENCOUNTER
Please make him an appointment with me sometime next week.  I have done everything I know to do regarding the shots.  I literally got a letter from his insurance company that tells me what they will cover and then they deny it.  I have sent in a prescription for medication called glimepiride.  It is 1 mg every morning.  Start taking that.  Check blood sugar once a day and let me know what blood sugars look like after 3 or 4 days.  We can continue to increase this medicine to get his blood sugars down.

## 2024-02-09 NOTE — TELEPHONE ENCOUNTER
Pt called office very frustrated.  States his blood sugars and running high, is unable to work full time because his health is failing.  Pt is demanding to speak to someone from the office.   He states he feels like he's falling through the cracks of the healthcare system.

## 2024-02-09 NOTE — TELEPHONE ENCOUNTER
We received the denial letter from his insurance stating why they denied the mounjaro. I have placed it on your desk.

## 2024-02-09 NOTE — TELEPHONE ENCOUNTER
Please give Ang a call and let him know that we got a letter from [employer] that did explain they were denying his Mounjaro because they wanted to see that he had tried and either could not tolerate, or it did not work for him, 2 other GLP-1 drugs.    We know he is already tried Trulicity and could not tolerate that.  The other is they cover Ozempic and Victoza.  I am going to send in a prescription for Ozempic.  Start that.  If it works, and he tolerates it we stick to it.  If not, this looks like the key to getting the Mounjaro covered.  If he has any questions, please let me know.  Thanks!

## 2024-02-09 NOTE — TELEPHONE ENCOUNTER
I just had to do a pa on the ozempic and they denied it also I do not have the reason why. I send his office note with it also.

## 2024-02-12 ENCOUNTER — TELEPHONE (OUTPATIENT)
Dept: FAMILY MEDICINE CLINIC | Facility: CLINIC | Age: 54
End: 2024-02-12
Payer: COMMERCIAL

## 2024-02-13 ENCOUNTER — OFFICE VISIT (OUTPATIENT)
Dept: CARDIOLOGY | Facility: CLINIC | Age: 54
End: 2024-02-13
Payer: COMMERCIAL

## 2024-02-13 VITALS
WEIGHT: 296 LBS | BODY MASS INDEX: 39.23 KG/M2 | SYSTOLIC BLOOD PRESSURE: 107 MMHG | DIASTOLIC BLOOD PRESSURE: 66 MMHG | HEART RATE: 59 BPM | HEIGHT: 73 IN | OXYGEN SATURATION: 97 %

## 2024-02-13 DIAGNOSIS — E66.01 CLASS 3 SEVERE OBESITY DUE TO EXCESS CALORIES WITH SERIOUS COMORBIDITY AND BODY MASS INDEX (BMI) OF 45.0 TO 49.9 IN ADULT: ICD-10-CM

## 2024-02-13 DIAGNOSIS — E11.65 TYPE 2 DIABETES MELLITUS WITH HYPERGLYCEMIA, WITHOUT LONG-TERM CURRENT USE OF INSULIN: ICD-10-CM

## 2024-02-13 DIAGNOSIS — E78.2 MIXED HYPERLIPIDEMIA: ICD-10-CM

## 2024-02-13 DIAGNOSIS — I25.10 CHRONIC CORONARY ARTERY DISEASE: Primary | ICD-10-CM

## 2024-02-13 DIAGNOSIS — Q24.5 CORONARY-MYOCARDIAL BRIDGE: ICD-10-CM

## 2024-02-13 DIAGNOSIS — I10 PRIMARY HYPERTENSION: ICD-10-CM

## 2024-02-13 PROCEDURE — 99214 OFFICE O/P EST MOD 30 MIN: CPT | Performed by: INTERNAL MEDICINE

## 2024-02-13 RX ORDER — METOPROLOL SUCCINATE 25 MG/1
12.5 TABLET, EXTENDED RELEASE ORAL DAILY
Qty: 90 TABLET | Refills: 3 | Status: SHIPPED | OUTPATIENT
Start: 2024-02-13

## 2024-02-13 RX ORDER — AMLODIPINE BESYLATE 5 MG/1
5 TABLET ORAL DAILY
Qty: 90 TABLET | Refills: 3 | Status: SHIPPED | OUTPATIENT
Start: 2024-02-13

## 2024-02-14 ENCOUNTER — OFFICE VISIT (OUTPATIENT)
Dept: FAMILY MEDICINE CLINIC | Facility: CLINIC | Age: 54
End: 2024-02-14
Payer: COMMERCIAL

## 2024-02-14 VITALS
SYSTOLIC BLOOD PRESSURE: 136 MMHG | BODY MASS INDEX: 39.76 KG/M2 | WEIGHT: 300 LBS | HEART RATE: 74 BPM | HEIGHT: 73 IN | OXYGEN SATURATION: 96 % | DIASTOLIC BLOOD PRESSURE: 86 MMHG | TEMPERATURE: 98 F

## 2024-02-14 DIAGNOSIS — E66.01 CLASS 2 SEVERE OBESITY DUE TO EXCESS CALORIES WITH SERIOUS COMORBIDITY AND BODY MASS INDEX (BMI) OF 39.0 TO 39.9 IN ADULT: ICD-10-CM

## 2024-02-14 DIAGNOSIS — I10 PRIMARY HYPERTENSION: ICD-10-CM

## 2024-02-14 DIAGNOSIS — E11.65 TYPE 2 DIABETES MELLITUS WITH HYPERGLYCEMIA, WITHOUT LONG-TERM CURRENT USE OF INSULIN: Primary | ICD-10-CM

## 2024-02-14 DIAGNOSIS — I25.10 CHRONIC CORONARY ARTERY DISEASE: ICD-10-CM

## 2024-02-14 PROCEDURE — 99214 OFFICE O/P EST MOD 30 MIN: CPT | Performed by: FAMILY MEDICINE

## 2024-02-14 NOTE — PROGRESS NOTES
"Subjective   Ang Yoon is a 53 y.o. male.   Chief Complaint   Patient presents with    Diabetes       History of Present Illness   Presents to the office today for what the schedule says is \"discuss diabetes medicines\".    He was on Trulicity in the past and had intolerance issues.  Specifically, nausea, abdominal pain and cramping.  He has been on Mounjaro for months.  Doing really well with that.  Apparently he got a different insurance and they do not cover it now.  We got a letter that said they he had to try and fail Ozempic first.  I sent in the Ozempic.  We get back something that says he has to get a PA on that and they denied the PA.  He tells me he has logged several hours on the phone with his insurance company.  He tells me he was told no medical information was sent with the request for the PA.    I reviewed that determination with him in the office today.  That said they denied it because they never saw an A1c above 6.5%.  That is because we treated his diabetes starting in early 2022 when his A1c was 7.9%.  In conjunction with effective medications and tremendous effort with diet and exercise changes on his part, his A1c has been under control for a year and a half.    They also wanted to see that he had tried and failed a generic alternative.  I sent in the prescription for glimepiride, which they generously approved 2 days ago.        See below.    \"  Spoke with patient he states the insurance is saying they do not have proof that he has tried trulicity and came off the contrave and even his a1cs. I advised that I have sent the office notes over to them through cover my med. I advised to keep the appointment on Wednesday and Dr Bass can put all that in his note to send to them again. Voiced he understood      \"    Ang was on Trulicity until early 2023.  He had achieved excellent control of his diabetes.  The Trulicity caused nausea, but he continued to take it in hopes it would " stop.  In July 2023 we made the decision to discontinue Trulicity in favor of Mounjaro.  He has tolerated that very well.    Since they have noncovered Mounjaro, his weight has gone up from 283 pounds to 300 and his blood sugars are now running sometimes as high as 300.  His excellent A1c of 5.4% reflected continued use of the Mounjaro as well as diet and exercise for the months and year prior.        Patient Active Problem List    Diagnosis Date Noted    Other insomnia 01/28/2024    Type 2 diabetes mellitus with hyperglycemia, without long-term current use of insulin 09/17/2022    Cervical radiculopathy 08/17/2020    Lumbar degenerative disc disease 04/29/2020    Chronic midline low back pain with bilateral sciatica 04/29/2020    Reactive airway disease with acute exacerbation 11/01/2019    Hyperlipidemia 07/11/2019    Class 2 severe obesity due to excess calories with serious comorbidity and body mass index (BMI) of 39.0 to 39.9 in adult 07/11/2019    Dyspnea 07/02/2019    GUILLERMINA (obstructive sleep apnea) 07/02/2019    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    Hypogonadism 06/13/2018    Other problems related to lifestyle 04/24/2018    Regular astigmatism 06/01/2017    Presbyopia 06/01/2017    Myopia 06/01/2017    Chronic coronary artery disease 09/19/2012    Cardiac disease 09/19/2012    Cyst of meniscus of knee 06/22/2012    Resting tremor 03/05/2012    Degenerative joint disease 02/08/2012    Hypertension 01/18/2012    Anxiety disorder 01/18/2012    Obsessive-compulsive disorder 01/18/2012    Memory loss 01/16/2012           Past Surgical History:   Procedure Laterality Date    ABLATION OF DYSRHYTHMIC FOCUS  2021 back    CARDIAC CATHETERIZATION  04/2018    KNEE ARTHROSCOPY Right 07/09/2012     Current Outpatient Medications on File Prior to Visit   Medication Sig    albuterol (PROVENTIL) (2.5 MG/3ML) 0.083% nebulizer solution USE 1 VIAL IN NEBULIZER EVERY 4 HOURS AS NEEDED FOR WHEEZING    albuterol sulfate   (90 Base) MCG/ACT inhaler Inhale 2 puffs Every 4 (Four) Hours As Needed for Wheezing.    amLODIPine (NORVASC) 5 MG tablet Take 1 tablet by mouth Daily.    Blood Glucose Monitoring Suppl kit Use as directed to check blood glucose    cetirizine (ZyrTEC Allergy) 10 MG tablet Take 1 tablet by mouth Daily.    clobetasol (TEMOVATE) 0.05 % external solution Apply  topically to the appropriate area as directed 2 (Two) Times a Day.    DULoxetine (CYMBALTA) 60 MG capsule Take 1 capsule by mouth once daily    EQ Aspirin Adult Low Dose 81 MG EC tablet Take 1 tablet by mouth once daily    fluticasone (FLONASE) 50 MCG/ACT nasal spray USE 2 SPRAY(S) IN EACH NOSTRIL ONCE DAILY AS DIRECTED    glucose blood test strip Use as instructed to check blood glucose once daily    Lancets misc Use once daily with meter and strips to check blood glucose    melatonin 5 MG tablet tablet Take 1 tablet by mouth At Night As Needed (insomnia).    metoprolol succinate XL (TOPROL-XL) 25 MG 24 hr tablet Take 0.5 tablets by mouth Daily.    naproxen sodium (ALEVE) 220 MG tablet Take 1 tablet by mouth 2 (Two) Times a Day As Needed.    nitroglycerin (Nitrostat) 0.4 MG SL tablet Place 1 tablet under the tongue Every 5 (Five) Minutes As Needed for Chest Pain. Take no more than 3 doses in 15 minutes.    rosuvastatin (CRESTOR) 20 MG tablet Take 1 tablet by mouth every night at bedtime.    traZODone (DESYREL) 50 MG tablet TAKE 1 TO 2 TABLETS BY MOUTH EVERY DAY AT BEDTIME AS NEEDED    glimepiride (Amaryl) 1 MG tablet Take 1 tablet by mouth Every Morning Before Breakfast. (Patient not taking: Reported on 2/13/2024)    [DISCONTINUED] Semaglutide,0.25 or 0.5MG/DOS, (OZEMPIC) 2 MG/3ML solution pen-injector Inject 0.25 mg under the skin once weekly x4 weeks, then increase to 0.5 mg weekly (Patient not taking: Reported on 2/14/2024)     No current facility-administered medications on file prior to visit.     Allergies   Allergen Reactions    Niacin Rash     Social  "History     Socioeconomic History    Marital status: Single    Number of children: 2   Tobacco Use    Smoking status: Former     Packs/day: 0.25     Years: 3.00     Additional pack years: 0.00     Total pack years: 0.75     Types: Cigarettes     Start date: 1988     Quit date: 1991     Years since quittin.1     Passive exposure: Past    Smokeless tobacco: Never    Tobacco comments:     barely   Vaping Use    Vaping Use: Never used   Substance and Sexual Activity    Alcohol use: No    Drug use: No    Sexual activity: Yes     Partners: Female     Birth control/protection: Condom     Family History   Problem Relation Age of Onset    Hearing loss Mother     No Known Problems Father     No Known Problems Sister     Asthma Brother         childhood    No Known Problems Maternal Aunt     No Known Problems Maternal Uncle     No Known Problems Paternal Aunt     No Known Problems Paternal Uncle     Arthritis Maternal Grandmother     Heart disease Maternal Grandmother     Arthritis Maternal Grandfather     Diabetes Maternal Grandfather     Stroke Maternal Grandfather     Heart attack Maternal Grandfather     No Known Problems Paternal Grandmother     No Known Problems Paternal Grandfather     Asthma Daughter     Heart disease Other     Hypertension Other     Stroke Other     Prostate cancer Other     Anemia Neg Hx     Arrhythmia Neg Hx     Clotting disorder Neg Hx     Fainting Neg Hx     Heart failure Neg Hx     Hyperlipidemia Neg Hx        Review of Systems    Objective   /86 (BP Location: Right arm, Patient Position: Sitting, Cuff Size: Large Adult)   Pulse 74   Temp 98 °F (36.7 °C) (Infrared)   Ht 185.4 cm (72.99\")   Wt 136 kg (300 lb)   SpO2 96%   BMI 39.59 kg/m²   Physical Exam  Constitutional:       Appearance: He is well-developed, well-groomed and overweight.      Comments: Pleasant, soft-spoken.     HENT:      Head: Normocephalic and atraumatic.   Eyes:      Conjunctiva/sclera: Conjunctivae " normal.   Cardiovascular:      Rate and Rhythm: Normal rate.   Pulmonary:      Effort: Pulmonary effort is normal.   Musculoskeletal:         General: Normal range of motion.      Cervical back: Normal range of motion.   Skin:     General: Skin is warm and dry.      Findings: No rash.   Neurological:      Mental Status: He is alert and oriented to person, place, and time.   Psychiatric:         Behavior: Behavior normal.           Office Visit on 01/08/2024   Component Date Value Ref Range Status    Hemoglobin A1C 01/08/2024 5.4  4.8 - 5.6 % Final    Comment:          Prediabetes: 5.7 - 6.4           Diabetes: >6.4           Glycemic control for adults with diabetes: <7.0      WBC 01/08/2024 7.6  3.4 - 10.8 x10E3/uL Final    RBC 01/08/2024 5.24  4.14 - 5.80 x10E6/uL Final    Hemoglobin 01/08/2024 16.4  13.0 - 17.7 g/dL Final    Hematocrit 01/08/2024 48.5  37.5 - 51.0 % Final    MCV 01/08/2024 93  79 - 97 fL Final    MCH 01/08/2024 31.3  26.6 - 33.0 pg Final    MCHC 01/08/2024 33.8  31.5 - 35.7 g/dL Final    RDW 01/08/2024 13.0  11.6 - 15.4 % Final    Platelets 01/08/2024 141 (L)  150 - 450 x10E3/uL Final    Neutrophil Rel % 01/08/2024 65  Not Estab. % Final    Lymphocyte Rel % 01/08/2024 25  Not Estab. % Final    Monocyte Rel % 01/08/2024 7  Not Estab. % Final    Eosinophil Rel % 01/08/2024 2  Not Estab. % Final    Basophil Rel % 01/08/2024 1  Not Estab. % Final    Neutrophils Absolute 01/08/2024 4.9  1.4 - 7.0 x10E3/uL Final    Lymphocytes Absolute 01/08/2024 1.9  0.7 - 3.1 x10E3/uL Final    Monocytes Absolute 01/08/2024 0.5  0.1 - 0.9 x10E3/uL Final    Eosinophils Absolute 01/08/2024 0.2  0.0 - 0.4 x10E3/uL Final    Basophils Absolute 01/08/2024 0.1  0.0 - 0.2 x10E3/uL Final    Immature Granulocyte Rel % 01/08/2024 0  Not Estab. % Final    Immature Grans Absolute 01/08/2024 0.0  0.0 - 0.1 x10E3/uL Final    Glucose 01/08/2024 86  70 - 99 mg/dL Final    BUN 01/08/2024 14  6 - 24 mg/dL Final    Creatinine 01/08/2024  0.88  0.76 - 1.27 mg/dL Final    EGFR Result 01/08/2024 102  >59 mL/min/1.73 Final    BUN/Creatinine Ratio 01/08/2024 16  9 - 20 Final    Sodium 01/08/2024 145 (H)  134 - 144 mmol/L Final    Potassium 01/08/2024 5.2  3.5 - 5.2 mmol/L Final    Chloride 01/08/2024 103  96 - 106 mmol/L Final    Total CO2 01/08/2024 26  20 - 29 mmol/L Final    Calcium 01/08/2024 9.7  8.7 - 10.2 mg/dL Final    Total Protein 01/08/2024 6.8  6.0 - 8.5 g/dL Final    Albumin 01/08/2024 4.6  3.8 - 4.9 g/dL Final    Globulin 01/08/2024 2.2  1.5 - 4.5 g/dL Final    A/G Ratio 01/08/2024 2.1  1.2 - 2.2 Final    Total Bilirubin 01/08/2024 0.6  0.0 - 1.2 mg/dL Final    Alkaline Phosphatase 01/08/2024 53  44 - 121 IU/L Final    AST (SGOT) 01/08/2024 19  0 - 40 IU/L Final    ALT (SGPT) 01/08/2024 14  0 - 44 IU/L Final             Assessment & Plan   Diagnoses and all orders for this visit:    1. Type 2 diabetes mellitus with hyperglycemia, without long-term current use of insulin (Primary)    2. Class 2 severe obesity due to excess calories with serious comorbidity and body mass index (BMI) of 39.0 to 39.9 in adult    3. Primary hypertension    Overall, I spent 30 minutes on the day of service with Ang discussing everything above and below.  He already has an appointment in early April for follow-up on his diabetes.  At this point, the only thing I know to do is to try to get a medical director on the phone and discussed this directly with the physician.  What we have here is a 53-year-old white male with a known history of coronary artery disease, high blood pressure, high cholesterol and diabetes.  His diabetes has successfully been controlled over the past few years using modern standard of care diabetes treatment.  He has also lost weight, probably in part at least due to these medications.  That has improved his blood pressure control.  His cholesterol has been good.  As above, his diabetes has been under excellent control.  All of this  reduces his risk of heart attack or stroke.  The fact that his current insurance company cannot see his entire medical history should not allow a situation whereby his health is put at risk of worsening.    He has provided me with 2 telephone [phone] and the prior authorization [id number].  I am going to try during a business day to get a medical director on the phone to discuss the situation with them.          Call with any problems or concerns before next visit       No follow-ups on file.      Much of this report is an electronic transcription of spoken language to printed text using Dragon dictation software.  As such, the subtleties and finesse of spoken language may permit erroneous, or at times, nonsensical words or phrases to be inadvertently transcribed; thus changes may be made at a later date to rectify these errors.     Traci Bass MD2/20/202417:40 EST  This note has been electronically signed

## 2024-02-15 ENCOUNTER — PATIENT ROUNDING (BHMG ONLY) (OUTPATIENT)
Dept: CARDIOLOGY | Facility: CLINIC | Age: 54
End: 2024-02-15
Payer: COMMERCIAL

## 2024-02-20 DIAGNOSIS — E11.65 TYPE 2 DIABETES MELLITUS WITH HYPERGLYCEMIA, WITHOUT LONG-TERM CURRENT USE OF INSULIN: ICD-10-CM

## 2024-02-20 PROBLEM — E66.812 CLASS 2 SEVERE OBESITY DUE TO EXCESS CALORIES WITH SERIOUS COMORBIDITY AND BODY MASS INDEX (BMI) OF 39.0 TO 39.9 IN ADULT: Status: ACTIVE | Noted: 2019-07-11

## 2024-03-15 DIAGNOSIS — E11.65 TYPE 2 DIABETES MELLITUS WITH HYPERGLYCEMIA, WITHOUT LONG-TERM CURRENT USE OF INSULIN: Primary | ICD-10-CM

## 2024-03-18 ENCOUNTER — TELEPHONE (OUTPATIENT)
Dept: CARDIOLOGY | Facility: CLINIC | Age: 54
End: 2024-03-18
Payer: COMMERCIAL

## 2024-03-18 ENCOUNTER — TELEPHONE (OUTPATIENT)
Dept: FAMILY MEDICINE CLINIC | Facility: CLINIC | Age: 54
End: 2024-03-18
Payer: COMMERCIAL

## 2024-03-18 NOTE — TELEPHONE ENCOUNTER
Additional efforts conducted today to pursue prior authorization for Mounjaro.  We have been notified that Mounjaro is authorized.  I sent a [employer] message to Ang informing him of such.  He already has a prescription over to [facility] for the Mounjaro.

## 2024-03-18 NOTE — TELEPHONE ENCOUNTER
Caller: Ang Yoon    Relationship: Self    Best call back number: [phone]     What is the best time to reach you: ANYTIME    Who are you requesting to speak with (clinical staff, provider,  specific staff member): CLINICAL    Do you know the name of the person who called: NA    What was the call regarding: PT WANTING TO SPEAK WITH MD ABOUT HIS UPCOMING TESTING APPT - PT STATES HE IS SCHEDULED FOR A STRESS TEST BUT IS BEING TOLD THAT MD ISNT COVERED THROUGH PTS [facility] INSURANCE AND PT WANTING TO SEE WHAT'S CHANGED AS PT HAS BEEN SEEN BEFORE AND SEES HIS PCP THROUGH SAME HOSPITAL - PT STATES HIS MEDICATIONS WERE CHANGED BY MD DURING FIRST VISIT AND HIS FU HAS BEEN PUSHED DUE TO STRESS TESTING GETTING BUMPED AND PT STATES HE HAS BEEN NOTICING HIS HEART BEAT AND HAVING UNCOMFORTABLE FEELINGS AND WANTING TO FU AS MEDICATION WAS CHANGED    Is it okay if the provider responds through MyChart: CALL BACK PLEASE

## 2024-03-18 NOTE — TELEPHONE ENCOUNTER
"Pt states the Mounjaro appeal has been denied.They state they will do an \"emergency\" appeal if we can this number and state that it needs to be reconsidered stat.      [phone]    Pt has gain 30lbs since all the issues with the medication.  He now has thrown his back out and is in bed because he cannot move about very well.      Very frustrated.  Please advise.   "

## 2024-03-19 NOTE — TELEPHONE ENCOUNTER
FYI, I spoke with pt regarding his insurance. Pt currently has an [employer] plan that is not in network with our office. The pt states that he is trying to get [employer] to work with him to let him change his plan to something else. Pt states he will contact our office back once he has his insurance settled.   No

## 2024-04-09 ENCOUNTER — OFFICE VISIT (OUTPATIENT)
Dept: FAMILY MEDICINE CLINIC | Facility: CLINIC | Age: 54
End: 2024-04-09
Payer: MEDICAID

## 2024-04-09 VITALS
OXYGEN SATURATION: 95 % | HEIGHT: 73 IN | RESPIRATION RATE: 18 BRPM | HEART RATE: 84 BPM | DIASTOLIC BLOOD PRESSURE: 88 MMHG | SYSTOLIC BLOOD PRESSURE: 138 MMHG | BODY MASS INDEX: 40.42 KG/M2 | TEMPERATURE: 97.3 F | WEIGHT: 305 LBS

## 2024-04-09 DIAGNOSIS — G47.33 OSA (OBSTRUCTIVE SLEEP APNEA): ICD-10-CM

## 2024-04-09 DIAGNOSIS — I10 PRIMARY HYPERTENSION: ICD-10-CM

## 2024-04-09 DIAGNOSIS — E11.65 TYPE 2 DIABETES MELLITUS WITH HYPERGLYCEMIA, WITHOUT LONG-TERM CURRENT USE OF INSULIN: Primary | ICD-10-CM

## 2024-04-09 DIAGNOSIS — M54.42 CHRONIC MIDLINE LOW BACK PAIN WITH BILATERAL SCIATICA: ICD-10-CM

## 2024-04-09 DIAGNOSIS — G89.29 CHRONIC MIDLINE LOW BACK PAIN WITH BILATERAL SCIATICA: ICD-10-CM

## 2024-04-09 DIAGNOSIS — M51.36 LUMBAR DEGENERATIVE DISC DISEASE: ICD-10-CM

## 2024-04-09 DIAGNOSIS — I25.10 CHRONIC CORONARY ARTERY DISEASE: ICD-10-CM

## 2024-04-09 DIAGNOSIS — G47.09 OTHER INSOMNIA: ICD-10-CM

## 2024-04-09 DIAGNOSIS — E78.2 MIXED HYPERLIPIDEMIA: ICD-10-CM

## 2024-04-09 DIAGNOSIS — M54.41 CHRONIC MIDLINE LOW BACK PAIN WITH BILATERAL SCIATICA: ICD-10-CM

## 2024-04-09 PROCEDURE — 1160F RVW MEDS BY RX/DR IN RCRD: CPT | Performed by: FAMILY MEDICINE

## 2024-04-09 PROCEDURE — 1159F MED LIST DOCD IN RCRD: CPT | Performed by: FAMILY MEDICINE

## 2024-04-09 PROCEDURE — T1015 CLINIC SERVICE: HCPCS | Performed by: FAMILY MEDICINE

## 2024-04-09 PROCEDURE — 99214 OFFICE O/P EST MOD 30 MIN: CPT | Performed by: FAMILY MEDICINE

## 2024-04-09 PROCEDURE — 3075F SYST BP GE 130 - 139MM HG: CPT | Performed by: FAMILY MEDICINE

## 2024-04-09 PROCEDURE — 3079F DIAST BP 80-89 MM HG: CPT | Performed by: FAMILY MEDICINE

## 2024-04-09 PROCEDURE — 3044F HG A1C LEVEL LT 7.0%: CPT | Performed by: FAMILY MEDICINE

## 2024-04-09 NOTE — PROGRESS NOTES
Subjective   Ang Yoon is a 53 y.o. male.   Chief Complaint   Patient presents with    Diabetes    Hypertension       Diabetes    Hypertension       53-year-old white male with history of coronary artery disease, high blood pressure, high cholesterol, type 2 diabetes, chronic lumbar degenerative disc disease, GUILLERMINA and insomnia presents to the office today for follow-up.    Last lab work was several months ago.  His A1c was still good.  Please see recent labs for details.  He has since changed insurance companies and his Mounjaro is covered again.  He is back on 2.5 mg and doing very well with that.  No side effects at all.  He had lost weight on the higher doses before we had to stop it because of noncoverage due to the other insurance.  He has not lost back down to where he was yet.  Sleeping is okay on trazodone.  Blood pressure today still looks okay at 138/88, but will has been lower when he lost weight.    He has coronary disease and his cardiologist wanted to do a stress test, but all they got canceled because his insurance just would not cover it.    Last lipid panel was July of last year and it was pretty good.  Triglycerides were 257.  Continues on Crestor 20 mg/day.      When he began to gain weight again off of Mounjaro his back began to hurt again and it had not hurt for a long time before then.      Patient Active Problem List    Diagnosis Date Noted    Other insomnia 01/28/2024    Type 2 diabetes mellitus with hyperglycemia, without long-term current use of insulin 09/17/2022    Cervical radiculopathy 08/17/2020    Lumbar degenerative disc disease 04/29/2020    Chronic midline low back pain with bilateral sciatica 04/29/2020    Reactive airway disease with acute exacerbation 11/01/2019    Hyperlipidemia 07/11/2019    Class 2 severe obesity due to excess calories with serious comorbidity and body mass index (BMI) of 39.0 to 39.9 in adult 07/11/2019    Dyspnea 07/02/2019    GUILLERMINA (obstructive  sleep apnea) 07/02/2019    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    Hypogonadism 06/13/2018    Other problems related to lifestyle 04/24/2018    Regular astigmatism 06/01/2017    Presbyopia 06/01/2017    Myopia 06/01/2017    Chronic coronary artery disease 09/19/2012    Cardiac disease 09/19/2012    Cyst of meniscus of knee 06/22/2012    Resting tremor 03/05/2012    Degenerative joint disease 02/08/2012    Hypertension 01/18/2012    Anxiety disorder 01/18/2012    Obsessive-compulsive disorder 01/18/2012    Memory loss 01/16/2012           Past Surgical History:   Procedure Laterality Date    ABLATION OF DYSRHYTHMIC FOCUS  2021 back    CARDIAC CATHETERIZATION  04/2018    KNEE ARTHROSCOPY Right 07/09/2012     Current Outpatient Medications on File Prior to Visit   Medication Sig    albuterol (PROVENTIL) (2.5 MG/3ML) 0.083% nebulizer solution USE 1 VIAL IN NEBULIZER EVERY 4 HOURS AS NEEDED FOR WHEEZING    albuterol sulfate  (90 Base) MCG/ACT inhaler Inhale 2 puffs Every 4 (Four) Hours As Needed for Wheezing.    amLODIPine (NORVASC) 5 MG tablet Take 1 tablet by mouth Daily.    cetirizine (ZyrTEC Allergy) 10 MG tablet Take 1 tablet by mouth Daily.    clobetasol (TEMOVATE) 0.05 % external solution Apply  topically to the appropriate area as directed 2 (Two) Times a Day.    DULoxetine (CYMBALTA) 60 MG capsule Take 1 capsule by mouth once daily    EQ Aspirin Adult Low Dose 81 MG EC tablet Take 1 tablet by mouth once daily    fluticasone (FLONASE) 50 MCG/ACT nasal spray USE 2 SPRAY(S) IN EACH NOSTRIL ONCE DAILY AS DIRECTED    glimepiride (Amaryl) 1 MG tablet Take 1 tablet by mouth Every Morning Before Breakfast.    melatonin 5 MG tablet tablet Take 1 tablet by mouth At Night As Needed (insomnia).    metoprolol succinate XL (TOPROL-XL) 25 MG 24 hr tablet Take 0.5 tablets by mouth Daily.    naproxen sodium (ALEVE) 220 MG tablet Take 1 tablet by mouth 2 (Two) Times a Day As Needed.    nitroglycerin (Nitrostat) 0.4 MG SL  tablet Place 1 tablet under the tongue Every 5 (Five) Minutes As Needed for Chest Pain. Take no more than 3 doses in 15 minutes.    rosuvastatin (CRESTOR) 20 MG tablet Take 1 tablet by mouth every night at bedtime.    traZODone (DESYREL) 50 MG tablet TAKE 1 TO 2 TABLETS BY MOUTH EVERY DAY AT BEDTIME AS NEEDED    [DISCONTINUED] Tirzepatide (MOUNJARO) 2.5 MG/0.5ML solution pen-injector pen Inject 0.5 mL under the skin into the appropriate area as directed 1 (One) Time Per Week.    Blood Glucose Monitoring Suppl kit Use as directed to check blood glucose (Patient not taking: Reported on 2024)    glucose blood test strip Use as instructed to check blood glucose once daily (Patient not taking: Reported on 2024)    Lancets misc Use once daily with meter and strips to check blood glucose (Patient not taking: Reported on 2024)     No current facility-administered medications on file prior to visit.     Allergies   Allergen Reactions    Niacin Rash     Social History     Socioeconomic History    Marital status: Single    Number of children: 2   Tobacco Use    Smoking status: Former     Current packs/day: 0.00     Average packs/day: 0.3 packs/day for 3.0 years (0.8 ttl pk-yrs)     Types: Cigarettes     Start date: 1988     Quit date: 1991     Years since quittin.2     Passive exposure: Past    Smokeless tobacco: Never    Tobacco comments:     barely   Vaping Use    Vaping status: Never Used   Substance and Sexual Activity    Alcohol use: No    Drug use: No    Sexual activity: Yes     Partners: Female     Birth control/protection: Condom     Family History   Problem Relation Age of Onset    Hearing loss Mother     No Known Problems Father     No Known Problems Sister     Asthma Brother         childhood    No Known Problems Maternal Aunt     No Known Problems Maternal Uncle     No Known Problems Paternal Aunt     No Known Problems Paternal Uncle     Arthritis Maternal Grandmother     Heart disease  "Maternal Grandmother     Arthritis Maternal Grandfather     Diabetes Maternal Grandfather     Stroke Maternal Grandfather     Heart attack Maternal Grandfather     No Known Problems Paternal Grandmother     No Known Problems Paternal Grandfather     Asthma Daughter     Heart disease Other     Hypertension Other     Stroke Other     Prostate cancer Other     Anemia Neg Hx     Arrhythmia Neg Hx     Clotting disorder Neg Hx     Fainting Neg Hx     Heart failure Neg Hx     Hyperlipidemia Neg Hx        Review of Systems    Objective   /88 (BP Location: Left arm, Patient Position: Sitting, Cuff Size: Large Adult)   Pulse 84   Temp 97.3 °F (36.3 °C) (Infrared)   Resp 18   Ht 185.4 cm (73\")   Wt (!) 138 kg (305 lb)   SpO2 95%   BMI 40.24 kg/m²   Physical Exam  Constitutional:       Appearance: He is well-developed.      Comments:      HENT:      Head: Normocephalic and atraumatic.   Eyes:      Conjunctiva/sclera: Conjunctivae normal.   Cardiovascular:      Rate and Rhythm: Normal rate.   Pulmonary:      Effort: Pulmonary effort is normal.   Musculoskeletal:         General: Normal range of motion.      Cervical back: Normal range of motion.   Skin:     General: Skin is warm and dry.      Findings: No rash.   Neurological:      Mental Status: He is alert and oriented to person, place, and time.   Psychiatric:         Behavior: Behavior normal.           Office Visit on 01/08/2024   Component Date Value Ref Range Status    Hemoglobin A1C 01/08/2024 5.4  4.8 - 5.6 % Final    Comment:          Prediabetes: 5.7 - 6.4           Diabetes: >6.4           Glycemic control for adults with diabetes: <7.0      WBC 01/08/2024 7.6  3.4 - 10.8 x10E3/uL Final    RBC 01/08/2024 5.24  4.14 - 5.80 x10E6/uL Final    Hemoglobin 01/08/2024 16.4  13.0 - 17.7 g/dL Final    Hematocrit 01/08/2024 48.5  37.5 - 51.0 % Final    MCV 01/08/2024 93  79 - 97 fL Final    MCH 01/08/2024 31.3  26.6 - 33.0 pg Final    MCHC 01/08/2024 33.8  31.5 - " 35.7 g/dL Final    RDW 01/08/2024 13.0  11.6 - 15.4 % Final    Platelets 01/08/2024 141 (L)  150 - 450 x10E3/uL Final    Neutrophil Rel % 01/08/2024 65  Not Estab. % Final    Lymphocyte Rel % 01/08/2024 25  Not Estab. % Final    Monocyte Rel % 01/08/2024 7  Not Estab. % Final    Eosinophil Rel % 01/08/2024 2  Not Estab. % Final    Basophil Rel % 01/08/2024 1  Not Estab. % Final    Neutrophils Absolute 01/08/2024 4.9  1.4 - 7.0 x10E3/uL Final    Lymphocytes Absolute 01/08/2024 1.9  0.7 - 3.1 x10E3/uL Final    Monocytes Absolute 01/08/2024 0.5  0.1 - 0.9 x10E3/uL Final    Eosinophils Absolute 01/08/2024 0.2  0.0 - 0.4 x10E3/uL Final    Basophils Absolute 01/08/2024 0.1  0.0 - 0.2 x10E3/uL Final    Immature Granulocyte Rel % 01/08/2024 0  Not Estab. % Final    Immature Grans Absolute 01/08/2024 0.0  0.0 - 0.1 x10E3/uL Final    Glucose 01/08/2024 86  70 - 99 mg/dL Final    BUN 01/08/2024 14  6 - 24 mg/dL Final    Creatinine 01/08/2024 0.88  0.76 - 1.27 mg/dL Final    EGFR Result 01/08/2024 102  >59 mL/min/1.73 Final    BUN/Creatinine Ratio 01/08/2024 16  9 - 20 Final    Sodium 01/08/2024 145 (H)  134 - 144 mmol/L Final    Potassium 01/08/2024 5.2  3.5 - 5.2 mmol/L Final    Chloride 01/08/2024 103  96 - 106 mmol/L Final    Total CO2 01/08/2024 26  20 - 29 mmol/L Final    Calcium 01/08/2024 9.7  8.7 - 10.2 mg/dL Final    Total Protein 01/08/2024 6.8  6.0 - 8.5 g/dL Final    Albumin 01/08/2024 4.6  3.8 - 4.9 g/dL Final    Globulin 01/08/2024 2.2  1.5 - 4.5 g/dL Final    A/G Ratio 01/08/2024 2.1  1.2 - 2.2 Final    Total Bilirubin 01/08/2024 0.6  0.0 - 1.2 mg/dL Final    Alkaline Phosphatase 01/08/2024 53  44 - 121 IU/L Final    AST (SGOT) 01/08/2024 19  0 - 40 IU/L Final    ALT (SGPT) 01/08/2024 14  0 - 44 IU/L Final       Class 3 Severe Obesity (BMI >=40). Obesity-related health conditions include the following: obstructive sleep apnea, hypertension, coronary heart disease, diabetes mellitus, dyslipidemias, GERD, and  osteoarthritis. Obesity is improving with treatment. BMI is is above average; BMI management plan is completed. We discussed portion control, increasing exercise, and pharmacologic options including Mounjaro for his diabetes .     Assessment & Plan   Diagnoses and all orders for this visit:    1. Type 2 diabetes mellitus with hyperglycemia, without long-term current use of insulin (Primary)  -     Microalbumin / Creatinine Urine Ratio - Urine, Clean Catch  -     Hemoglobin A1c  -     Tirzepatide (MOUNJARO) 7.5 MG/0.5ML solution pen-injector pen; Inject 0.5 mL under the skin into the appropriate area as directed 1 (One) Time Per Week.  Dispense: 2 mL; Refill: 0  -     Comprehensive Metabolic Panel    2. Chronic coronary artery disease    3. Other insomnia    4. GUILLREMINA (obstructive sleep apnea)    5. Lumbar degenerative disc disease    6. Chronic midline low back pain with bilateral sciatica    7. Mixed hyperlipidemia    8. Primary hypertension    Status of multiple chronic medical problems reviewed today as above.  Blood pressure control is stable.  Continue amlodipine at current dose.  Last lipid panel 6 months ago was okay.  Continue Crestor 20 mg/day.  Keep follow-up with cardiology per her orders.  Continue trazodone for insomnia.  We have gotten back on Mounjaro and we will titrate that back upward.  He was on 7.5 mg before we had to stop it and he would like to go back on that dose.  We discussed tolerability issues.  As long as he can tolerate it, this is good plan will get labs as above to monitor status of his diabetes.  I will follow-up with him when those numbers are back.  Follow-up here again in 3 months or sooner if needed.        Call with any problems or concerns before next visit       Return in about 3 months (around 7/9/2024).      Much of this report is an electronic transcription of spoken language to printed text using Dragon dictation software.  As such, the subtleties and finesse of spoken  language may permit erroneous, or at times, nonsensical words or phrases to be inadvertently transcribed; thus changes may be made at a later date to rectify these errors.     Traci Bass MD4/9/202416:27 EDT  This note has been electronically signed

## 2024-04-10 LAB
ALBUMIN SERPL-MCNC: 4.6 G/DL (ref 3.8–4.9)
ALBUMIN/CREAT UR: 4 MG/G CREAT (ref 0–29)
ALBUMIN/GLOB SERPL: 1.8 {RATIO} (ref 1.2–2.2)
ALP SERPL-CCNC: 59 IU/L (ref 44–121)
ALT SERPL-CCNC: 22 IU/L (ref 0–44)
AST SERPL-CCNC: 24 IU/L (ref 0–40)
BILIRUB SERPL-MCNC: 0.6 MG/DL (ref 0–1.2)
BUN SERPL-MCNC: 16 MG/DL (ref 6–24)
BUN/CREAT SERPL: 18 (ref 9–20)
CALCIUM SERPL-MCNC: 9.7 MG/DL (ref 8.7–10.2)
CHLORIDE SERPL-SCNC: 102 MMOL/L (ref 96–106)
CO2 SERPL-SCNC: 26 MMOL/L (ref 20–29)
CREAT SERPL-MCNC: 0.89 MG/DL (ref 0.76–1.27)
CREAT UR-MCNC: 204.9 MG/DL
EGFRCR SERPLBLD CKD-EPI 2021: 102 ML/MIN/1.73
GLOBULIN SER CALC-MCNC: 2.5 G/DL (ref 1.5–4.5)
GLUCOSE SERPL-MCNC: 92 MG/DL (ref 70–99)
HBA1C MFR BLD: 5.9 % (ref 4.8–5.6)
MICROALBUMIN UR-MCNC: 7.4 UG/ML
POTASSIUM SERPL-SCNC: 5.2 MMOL/L (ref 3.5–5.2)
PROT SERPL-MCNC: 7.1 G/DL (ref 6–8.5)
SODIUM SERPL-SCNC: 142 MMOL/L (ref 134–144)

## 2024-04-15 ENCOUNTER — TELEPHONE (OUTPATIENT)
Dept: FAMILY MEDICINE CLINIC | Facility: CLINIC | Age: 54
End: 2024-04-15
Payer: MEDICAID

## 2024-04-15 DIAGNOSIS — E11.65 TYPE 2 DIABETES MELLITUS WITH HYPERGLYCEMIA, WITHOUT LONG-TERM CURRENT USE OF INSULIN: ICD-10-CM

## 2024-04-15 NOTE — TELEPHONE ENCOUNTER
Caller: Ang Yoon    Relationship: Self    Best call back number: [phone]    Who are you requesting to speak with (clinical staff, provider,  specific staff member): CLINICAL STAFF    What was the call regarding: STATED THAT THEY HAVE BEEN WITHOUT THE Tirzepatide (MOUNJARO) 7.5 MG/0.5ML solution pen-injector pen FOR ABOUT A WEEK AS THEIR PHARMACY IS OUT OF THE MEDICATION. STATED THAT THEY ARE GOING TO START CALLING AROUND TO SEE WHO HAS IT. PLEASE CALL AND ADVISE ON FURTHER ACTION

## 2024-04-15 NOTE — TELEPHONE ENCOUNTER
Called patient and advised all he could do is call around and see who has the medication and if they have different dose and let us know. He is going to do that and than send us a my chart message

## 2024-05-05 DIAGNOSIS — E11.65 TYPE 2 DIABETES MELLITUS WITH HYPERGLYCEMIA, WITHOUT LONG-TERM CURRENT USE OF INSULIN: ICD-10-CM

## 2024-05-06 RX ORDER — GLIMEPIRIDE 1 MG/1
1 TABLET ORAL
Qty: 90 TABLET | Refills: 3 | Status: SHIPPED | OUTPATIENT
Start: 2024-05-06

## 2024-06-05 ENCOUNTER — OFFICE VISIT (OUTPATIENT)
Dept: FAMILY MEDICINE CLINIC | Facility: CLINIC | Age: 54
End: 2024-06-05
Payer: MEDICAID

## 2024-06-05 VITALS
WEIGHT: 302.6 LBS | HEART RATE: 88 BPM | HEIGHT: 73 IN | BODY MASS INDEX: 40.11 KG/M2 | OXYGEN SATURATION: 95 % | RESPIRATION RATE: 18 BRPM | DIASTOLIC BLOOD PRESSURE: 82 MMHG | TEMPERATURE: 96.9 F | SYSTOLIC BLOOD PRESSURE: 126 MMHG

## 2024-06-05 DIAGNOSIS — M23.92 KNEE INTERNAL DERANGEMENT, LEFT: Primary | ICD-10-CM

## 2024-06-05 DIAGNOSIS — E11.65 TYPE 2 DIABETES MELLITUS WITH HYPERGLYCEMIA, WITHOUT LONG-TERM CURRENT USE OF INSULIN: ICD-10-CM

## 2024-06-05 DIAGNOSIS — F42.8 OBSESSIVE THINKING: ICD-10-CM

## 2024-06-05 PROCEDURE — 3079F DIAST BP 80-89 MM HG: CPT | Performed by: FAMILY MEDICINE

## 2024-06-05 PROCEDURE — 3074F SYST BP LT 130 MM HG: CPT | Performed by: FAMILY MEDICINE

## 2024-06-05 PROCEDURE — 3044F HG A1C LEVEL LT 7.0%: CPT | Performed by: FAMILY MEDICINE

## 2024-06-05 PROCEDURE — 1125F AMNT PAIN NOTED PAIN PRSNT: CPT | Performed by: FAMILY MEDICINE

## 2024-06-05 PROCEDURE — 99214 OFFICE O/P EST MOD 30 MIN: CPT | Performed by: FAMILY MEDICINE

## 2024-06-05 PROCEDURE — 1159F MED LIST DOCD IN RCRD: CPT | Performed by: FAMILY MEDICINE

## 2024-06-05 PROCEDURE — T1015 CLINIC SERVICE: HCPCS | Performed by: FAMILY MEDICINE

## 2024-06-05 PROCEDURE — 1160F RVW MEDS BY RX/DR IN RCRD: CPT | Performed by: FAMILY MEDICINE

## 2024-06-05 RX ORDER — CELECOXIB 200 MG/1
200 CAPSULE ORAL DAILY
Qty: 30 CAPSULE | Refills: 5 | Status: SHIPPED | OUTPATIENT
Start: 2024-06-05

## 2024-06-05 RX ORDER — ARIPIPRAZOLE 5 MG/1
5 TABLET ORAL DAILY
Qty: 30 TABLET | Refills: 2 | Status: SHIPPED | OUTPATIENT
Start: 2024-06-05

## 2024-06-05 NOTE — PROGRESS NOTES
Subjective   Ang Yoon is a 54 y.o. male.   Chief Complaint   Patient presents with    Leg Pain    Knee Pain       History of Present Illness   54 y.o. male with PMH of diabetes type 2, severe lumbar degenerative disc disease, cervical disc disease with radiculopathy, chronic coronary artery disease, HTN, GUILLERMINA presents to the office today for what the schedule says is left knee and leg pain.  No recent injury to his left knee.  He had an old injury to the left knee from the .  He reports that the knee is very sore.  Has a lot of pain when climbing stairs.  Sometimes it feels like the knee is going to give out.  He has a hard time fully extending and flexing the knee.  Pops when he walks.  Feels swollen.    He has already had to have a right knee surgery due to severe arthritis of the right knee.        Notably, he has been struggling with his weight.  He was doing excellent on Mounjaro until his insurance changed and would not cover it.  He finally got different insurance.  They are covering the Mounjaro, but there was something that happened at [facility] and he was told they would mail it to him by June 1.  He is going to be ready for next dose of refill within a week.      He mentions today that he has been bothered for years by hearing the son paraphrase over and over in his head from the minute he wakes up until the time he goes to sleep.  This has actually interfered with his ability to have conversations.  Seemingly random phrases may have replace the current repetitive phrase or song in his head.    His chart indicates a history of some anxiety and OCD.  He is never really talked to anyone about these repetitive phrases or songs.  He is just dealt with it.  He is curious if anything can be done.        Patient Active Problem List    Diagnosis Date Noted    Other insomnia 01/28/2024    Type 2 diabetes mellitus with hyperglycemia, without long-term current use of insulin 09/17/2022     Cervical radiculopathy 08/17/2020    Lumbar degenerative disc disease 04/29/2020    Chronic midline low back pain with bilateral sciatica 04/29/2020    Reactive airway disease with acute exacerbation 11/01/2019    Hyperlipidemia 07/11/2019    Class 2 severe obesity due to excess calories with serious comorbidity and body mass index (BMI) of 39.0 to 39.9 in adult 07/11/2019    Dyspnea 07/02/2019    GUILLERMINA (obstructive sleep apnea) 07/02/2019    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    Hypogonadism 06/13/2018    Other problems related to lifestyle 04/24/2018    Regular astigmatism 06/01/2017    Presbyopia 06/01/2017    Myopia 06/01/2017    Chronic coronary artery disease 09/19/2012    Cardiac disease 09/19/2012    Cyst of meniscus of knee 06/22/2012    Resting tremor 03/05/2012    Degenerative joint disease 02/08/2012    Hypertension 01/18/2012    Anxiety disorder 01/18/2012    Obsessive-compulsive disorder 01/18/2012    Memory loss 01/16/2012           Past Surgical History:   Procedure Laterality Date    ABLATION OF DYSRHYTHMIC FOCUS  2021 back    CARDIAC CATHETERIZATION  04/2018    KNEE ARTHROSCOPY Right 07/09/2012     Current Outpatient Medications on File Prior to Visit   Medication Sig    albuterol (PROVENTIL) (2.5 MG/3ML) 0.083% nebulizer solution USE 1 VIAL IN NEBULIZER EVERY 4 HOURS AS NEEDED FOR WHEEZING    albuterol sulfate  (90 Base) MCG/ACT inhaler Inhale 2 puffs Every 4 (Four) Hours As Needed for Wheezing.    amLODIPine (NORVASC) 5 MG tablet Take 1 tablet by mouth Daily.    cetirizine (ZyrTEC Allergy) 10 MG tablet Take 1 tablet by mouth Daily.    clobetasol (TEMOVATE) 0.05 % external solution Apply  topically to the appropriate area as directed 2 (Two) Times a Day.    DULoxetine (CYMBALTA) 60 MG capsule Take 1 capsule by mouth once daily    EQ Aspirin Adult Low Dose 81 MG EC tablet Take 1 tablet by mouth once daily    fluticasone (FLONASE) 50 MCG/ACT nasal spray USE 2 SPRAY(S) IN EACH NOSTRIL ONCE  DAILY AS DIRECTED    glimepiride (AMARYL) 1 MG tablet TAKE 1 TABLET BY MOUTH ONCE DAILY IN THE MORNING BEFORE BREAKFAST    melatonin 5 MG tablet tablet Take 1 tablet by mouth At Night As Needed (insomnia).    metoprolol succinate XL (TOPROL-XL) 25 MG 24 hr tablet Take 0.5 tablets by mouth Daily.    naproxen sodium (ALEVE) 220 MG tablet Take 1 tablet by mouth 2 (Two) Times a Day As Needed.    rosuvastatin (CRESTOR) 20 MG tablet Take 1 tablet by mouth every night at bedtime.    Tirzepatide (MOUNJARO) 7.5 MG/0.5ML solution pen-injector pen Inject 0.5 mL under the skin into the appropriate area as directed 1 (One) Time Per Week.    traZODone (DESYREL) 50 MG tablet TAKE 1 TO 2 TABLETS BY MOUTH EVERY DAY AT BEDTIME AS NEEDED    Blood Glucose Monitoring Suppl kit Use as directed to check blood glucose (Patient not taking: Reported on 2024)    glucose blood test strip Use as instructed to check blood glucose once daily (Patient not taking: Reported on 2024)    Lancets misc Use once daily with meter and strips to check blood glucose (Patient not taking: Reported on 2024)    nitroglycerin (Nitrostat) 0.4 MG SL tablet Place 1 tablet under the tongue Every 5 (Five) Minutes As Needed for Chest Pain. Take no more than 3 doses in 15 minutes.    Tirzepatide (MOUNJARO) 2.5 MG/0.5ML solution pen-injector pen Inject 0.5 mL under the skin into the appropriate area as directed 1 (One) Time Per Week. (Patient not taking: Reported on 2024)     No current facility-administered medications on file prior to visit.     Allergies   Allergen Reactions    Niacin Rash     Social History     Socioeconomic History    Marital status: Single    Number of children: 2   Tobacco Use    Smoking status: Former     Current packs/day: 0.00     Average packs/day: 0.3 packs/day for 3.0 years (0.8 ttl pk-yrs)     Types: Cigarettes     Start date: 1988     Quit date: 1991     Years since quittin.4     Passive exposure: Past     "Smokeless tobacco: Never    Tobacco comments:     barely   Vaping Use    Vaping status: Never Used   Substance and Sexual Activity    Alcohol use: No    Drug use: No    Sexual activity: Yes     Partners: Female     Birth control/protection: Condom     Family History   Problem Relation Age of Onset    Hearing loss Mother     No Known Problems Father     No Known Problems Sister     Asthma Brother         childhood    No Known Problems Maternal Aunt     No Known Problems Maternal Uncle     No Known Problems Paternal Aunt     No Known Problems Paternal Uncle     Arthritis Maternal Grandmother     Heart disease Maternal Grandmother     Arthritis Maternal Grandfather     Diabetes Maternal Grandfather     Stroke Maternal Grandfather     Heart attack Maternal Grandfather     No Known Problems Paternal Grandmother     No Known Problems Paternal Grandfather     Asthma Daughter     Heart disease Other     Hypertension Other     Stroke Other     Prostate cancer Other     Anemia Neg Hx     Arrhythmia Neg Hx     Clotting disorder Neg Hx     Fainting Neg Hx     Heart failure Neg Hx     Hyperlipidemia Neg Hx        Review of Systems    Objective   /82 (BP Location: Right arm, Patient Position: Sitting, Cuff Size: Large Adult)   Pulse 88   Temp 96.9 °F (36.1 °C) (Infrared)   Resp 18   Ht 185.4 cm (73\")   Wt (!) 137 kg (302 lb 9.6 oz)   SpO2 95%   BMI 39.92 kg/m²   Physical Exam  Constitutional:       Appearance: He is well-developed.      Comments:      HENT:      Head: Normocephalic and atraumatic.   Eyes:      Conjunctiva/sclera: Conjunctivae normal.   Cardiovascular:      Rate and Rhythm: Normal rate.   Pulmonary:      Effort: Pulmonary effort is normal.   Musculoskeletal:         General: Normal range of motion.      Cervical back: Normal range of motion.      Comments: Left knee has minor effusion.  Anterior and posterior drawer sign are negative.  Knee is stable to lateral forces.  There is crepitus, clunking to " flexion and extension.  He is not able to fully extend or flex his left knee.  Lachman's test is positive.   Skin:     General: Skin is warm and dry.      Findings: No rash.   Neurological:      Mental Status: He is alert and oriented to person, place, and time.   Psychiatric:         Behavior: Behavior normal.           Office Visit on 04/09/2024   Component Date Value Ref Range Status    Creatinine, Urine 04/09/2024 204.9  Not Estab. mg/dL Final    Microalbumin, Urine 04/09/2024 7.4  Not Estab. ug/mL Final    Microalbumin/Creatinine Ratio 04/09/2024 4  0 - 29 mg/g creat Final    Comment:                        Normal:                0 -  29                         Moderately increased: 30 - 300                         Severely increased:       >300      Hemoglobin A1C 04/09/2024 5.9 (H)  4.8 - 5.6 % Final    Comment:          Prediabetes: 5.7 - 6.4           Diabetes: >6.4           Glycemic control for adults with diabetes: <7.0      Glucose 04/09/2024 92  70 - 99 mg/dL Final    BUN 04/09/2024 16  6 - 24 mg/dL Final    Creatinine 04/09/2024 0.89  0.76 - 1.27 mg/dL Final    EGFR Result 04/09/2024 102  >59 mL/min/1.73 Final    BUN/Creatinine Ratio 04/09/2024 18  9 - 20 Final    Sodium 04/09/2024 142  134 - 144 mmol/L Final    Potassium 04/09/2024 5.2  3.5 - 5.2 mmol/L Final    Chloride 04/09/2024 102  96 - 106 mmol/L Final    Total CO2 04/09/2024 26  20 - 29 mmol/L Final    Calcium 04/09/2024 9.7  8.7 - 10.2 mg/dL Final    Total Protein 04/09/2024 7.1  6.0 - 8.5 g/dL Final    Albumin 04/09/2024 4.6  3.8 - 4.9 g/dL Final    Globulin 04/09/2024 2.5  1.5 - 4.5 g/dL Final    A/G Ratio 04/09/2024 1.8  1.2 - 2.2 Final    Total Bilirubin 04/09/2024 0.6  0.0 - 1.2 mg/dL Final    Alkaline Phosphatase 04/09/2024 59  44 - 121 IU/L Final    AST (SGOT) 04/09/2024 24  0 - 40 IU/L Final    ALT (SGPT) 04/09/2024 22  0 - 44 IU/L Final             Assessment & Plan   Diagnoses and all orders for this visit:    1. Knee internal  "derangement, left (Primary)  -     XR Knee 1 or 2 View Left; Future  -     celecoxib (CeleBREX) 200 MG capsule; Take 1 capsule by mouth Daily.  Dispense: 30 capsule; Refill: 5    2. Type 2 diabetes mellitus with hyperglycemia, without long-term current use of insulin  -     Tirzepatide (MOUNJARO) 10 MG/0.5ML solution pen-injector pen; Inject 0.5 mL under the skin into the appropriate area as directed 1 (One) Time Per Week.  Dispense: 2 mL; Refill: 0    3. Obsessive thinking    Other orders  -     ARIPiprazole (Abilify) 5 MG tablet; Take 1 tablet by mouth Daily.  Dispense: 30 tablet; Refill: 2    He has had an old mechanical injury of his left knee.  He has not had any new injuries, but he describes symptoms very suggestive of meniscal injury.  We are going to do an x-ray of his left knee, start him on daily Celebrex.  Recommend external neoprene bracing or tight Ace wrapping for support while we are doing our workup.  I suspect he will also need an MRI.    He is ready to have the next dose up on Mounjaro.  Prescription sent in for that.    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  I am not able to identify an ICD 10 code for either of those things.  He is already on Cymbalta, so we will try an augmentation dose of Abilify to see if it helps that.  I will follow-up with him when the x-ray result is back and we will see him again in 1 month for ongoing management of his diabetes.    Keep follow-up next month as planned for reevaluation of his diabetes.    Call with any problems or concerns before next visit       Return Keep follow-up as planned in July..      Much of this report is an electronic transcription of spoken language to printed text using Dragon dictation software.  As such, the subtleties and finesse of spoken language may permit erroneous, or at times, nonsensical words or phrases to be inadvertently transcribed; thus changes may be made at a later date " to rectify these errors.     Traci Bass MD6/5/202423:07 EDT  This note has been electronically signed

## 2024-06-06 ENCOUNTER — HOSPITAL ENCOUNTER (OUTPATIENT)
Dept: GENERAL RADIOLOGY | Facility: HOSPITAL | Age: 54
Discharge: HOME | End: 2024-06-06
Payer: MEDICAID

## 2024-06-06 DIAGNOSIS — M23.92 KNEE INTERNAL DERANGEMENT, LEFT: ICD-10-CM

## 2024-06-06 PROCEDURE — 73560 X-RAY EXAM OF KNEE 1 OR 2: CPT

## 2024-06-07 DIAGNOSIS — M23.42 LOOSE BODY IN KNEE, LEFT: ICD-10-CM

## 2024-06-07 DIAGNOSIS — M23.92 KNEE INTERNAL DERANGEMENT, LEFT: Primary | ICD-10-CM

## 2024-06-10 ENCOUNTER — TELEPHONE (OUTPATIENT)
Dept: FAMILY MEDICINE CLINIC | Facility: CLINIC | Age: 54
End: 2024-06-10
Payer: MEDICAID

## 2024-06-10 NOTE — TELEPHONE ENCOUNTER
I called the patient about the my chart message. He voiced he understood. He would also like to know if you recommend anything for him to do. He states he notices when he does not bend his knee and uses his cane he does not have any issues. He wants to know if you recommend a brace or something as we wait for the MRI to be approved by insurance.

## 2024-06-17 NOTE — TELEPHONE ENCOUNTER
Patient screened for length of stay. No nutrition intervention indicated at this time. RD available by consult. Will follow per policy.     Spoke to pt, he voiced understanding.

## 2024-06-24 ENCOUNTER — PATIENT MESSAGE (OUTPATIENT)
Dept: FAMILY MEDICINE CLINIC | Facility: CLINIC | Age: 54
End: 2024-06-24
Payer: MEDICAID

## 2024-06-24 DIAGNOSIS — E11.65 TYPE 2 DIABETES MELLITUS WITH HYPERGLYCEMIA, WITHOUT LONG-TERM CURRENT USE OF INSULIN: ICD-10-CM

## 2024-06-24 NOTE — TELEPHONE ENCOUNTER
From: Tunde Yoon  To: Traci Bass  Sent: 6/24/2024 12:25 PM EDT  Subject: mounjaro    [facility] has 7.5 in stock. Please send a one or two month prescription while they have it n stock. [facility] is a poorly organized dispensary.     thank you  tunde

## 2024-06-29 ENCOUNTER — PATIENT MESSAGE (OUTPATIENT)
Dept: CARDIOLOGY | Facility: CLINIC | Age: 54
End: 2024-06-29
Payer: MEDICAID

## 2024-07-01 ENCOUNTER — HOSPITAL ENCOUNTER (OUTPATIENT)
Dept: MRI IMAGING | Facility: HOSPITAL | Age: 54
Discharge: HOME | End: 2024-07-01
Admitting: FAMILY MEDICINE
Payer: MEDICAID

## 2024-07-01 ENCOUNTER — TELEPHONE (OUTPATIENT)
Dept: CARDIOLOGY | Facility: CLINIC | Age: 54
End: 2024-07-01
Payer: MEDICAID

## 2024-07-01 DIAGNOSIS — M23.92 KNEE INTERNAL DERANGEMENT, LEFT: ICD-10-CM

## 2024-07-01 DIAGNOSIS — M23.42 LOOSE BODY IN KNEE, LEFT: ICD-10-CM

## 2024-07-01 DIAGNOSIS — R07.9 ACUTE CHEST PAIN: ICD-10-CM

## 2024-07-01 DIAGNOSIS — Q24.5 CORONARY-MYOCARDIAL BRIDGE: ICD-10-CM

## 2024-07-01 DIAGNOSIS — I25.10 CHRONIC CORONARY ARTERY DISEASE: Primary | ICD-10-CM

## 2024-07-01 PROCEDURE — 73721 MRI JNT OF LWR EXTRE W/O DYE: CPT

## 2024-07-01 NOTE — TELEPHONE ENCOUNTER
I spoke with pt to schedule stress test and went over all instructions. Pt was ok coming to [facility] for his test.

## 2024-07-01 NOTE — TELEPHONE ENCOUNTER
Regarding: STRESS TEST  Contact: [phone]  ----- Message from Deanna Ogden MA sent at 7/1/2024  9:34 AM EDT -----       ----- Message from Ang Yoon to Marisol Nugent MD sent at 6/29/2024 10:36 AM -----   i would like to reschedule my stress test, my insurance is straightened out now.  thanks

## 2024-07-03 DIAGNOSIS — M23.42 LOOSE BODY IN KNEE, LEFT: ICD-10-CM

## 2024-07-03 DIAGNOSIS — M23.92 KNEE INTERNAL DERANGEMENT, LEFT: Primary | ICD-10-CM

## 2024-07-08 ENCOUNTER — HOSPITAL ENCOUNTER (OUTPATIENT)
Dept: CARDIOLOGY | Facility: HOSPITAL | Age: 54
Discharge: HOME | End: 2024-07-08
Payer: MEDICAID

## 2024-07-08 DIAGNOSIS — Q24.5 CORONARY-MYOCARDIAL BRIDGE: ICD-10-CM

## 2024-07-08 DIAGNOSIS — R07.9 ACUTE CHEST PAIN: ICD-10-CM

## 2024-07-08 DIAGNOSIS — I25.10 CHRONIC CORONARY ARTERY DISEASE: ICD-10-CM

## 2024-07-08 LAB
BH CV REST NUCLEAR ISOTOPE DOSE: 10.5 MCI
BH CV STRESS COMMENTS STAGE 1: NORMAL
BH CV STRESS DOSE REGADENOSON STAGE 1: 0.4
BH CV STRESS DURATION MIN STAGE 1: 0
BH CV STRESS DURATION SEC STAGE 1: 10
BH CV STRESS NUCLEAR ISOTOPE DOSE: 31.5 MCI
BH CV STRESS PROTOCOL 1: NORMAL
BH CV STRESS RECOVERY BP: NORMAL MMHG
BH CV STRESS RECOVERY HR: 110 BPM
BH CV STRESS STAGE 1: 1
LV EF NUC BP: 60 %
MAXIMAL PREDICTED HEART RATE: 165 BPM
STRESS BASELINE BP: NORMAL MMHG
STRESS BASELINE HR: 83 BPM
STRESS TARGET HR: 140 BPM

## 2024-07-08 PROCEDURE — A9500 TC99M SESTAMIBI: HCPCS | Performed by: INTERNAL MEDICINE

## 2024-07-08 PROCEDURE — 93018 CV STRESS TEST I&R ONLY: CPT | Performed by: INTERNAL MEDICINE

## 2024-07-08 PROCEDURE — 25010000002 REGADENOSON 0.4 MG/5ML SOLUTION: Performed by: INTERNAL MEDICINE

## 2024-07-08 PROCEDURE — 0 TECHNETIUM SESTAMIBI: Performed by: INTERNAL MEDICINE

## 2024-07-08 PROCEDURE — 93017 CV STRESS TEST TRACING ONLY: CPT

## 2024-07-08 PROCEDURE — 78452 HT MUSCLE IMAGE SPECT MULT: CPT | Performed by: INTERNAL MEDICINE

## 2024-07-08 PROCEDURE — 78452 HT MUSCLE IMAGE SPECT MULT: CPT

## 2024-07-08 PROCEDURE — 0 TECHNETIUM SESTAMIBI: Performed by: NURSE PRACTITIONER

## 2024-07-08 PROCEDURE — A9500 TC99M SESTAMIBI: HCPCS | Performed by: NURSE PRACTITIONER

## 2024-07-08 PROCEDURE — 93016 CV STRESS TEST SUPVJ ONLY: CPT | Performed by: INTERNAL MEDICINE

## 2024-07-08 RX ORDER — REGADENOSON 0.08 MG/ML
0.4 INJECTION, SOLUTION INTRAVENOUS
Status: COMPLETED | OUTPATIENT
Start: 2024-07-08 | End: 2024-07-08

## 2024-07-08 RX ADMIN — TECHNETIUM TC 99M SESTAMIBI 1 DOSE: 1 INJECTION INTRAVENOUS at 12:26

## 2024-07-08 RX ADMIN — REGADENOSON 0.4 MG: 0.08 INJECTION, SOLUTION INTRAVENOUS at 14:32

## 2024-07-08 RX ADMIN — TECHNETIUM TC 99M SESTAMIBI 1 DOSE: 1 INJECTION INTRAVENOUS at 14:32

## 2024-07-09 ENCOUNTER — OFFICE VISIT (OUTPATIENT)
Dept: FAMILY MEDICINE CLINIC | Facility: CLINIC | Age: 54
End: 2024-07-09
Payer: MEDICAID

## 2024-07-09 VITALS
HEART RATE: 84 BPM | TEMPERATURE: 97.3 F | SYSTOLIC BLOOD PRESSURE: 120 MMHG | RESPIRATION RATE: 18 BRPM | WEIGHT: 302.6 LBS | DIASTOLIC BLOOD PRESSURE: 86 MMHG | OXYGEN SATURATION: 96 % | BODY MASS INDEX: 40.11 KG/M2 | HEIGHT: 73 IN

## 2024-07-09 DIAGNOSIS — I25.10 CHRONIC CORONARY ARTERY DISEASE: ICD-10-CM

## 2024-07-09 DIAGNOSIS — Z11.59 NEED FOR HEPATITIS C SCREENING TEST: ICD-10-CM

## 2024-07-09 DIAGNOSIS — E78.2 MIXED HYPERLIPIDEMIA: ICD-10-CM

## 2024-07-09 DIAGNOSIS — M51.36 LUMBAR DEGENERATIVE DISC DISEASE: ICD-10-CM

## 2024-07-09 DIAGNOSIS — M54.12 CERVICAL RADICULOPATHY: ICD-10-CM

## 2024-07-09 DIAGNOSIS — F42.8 OBSESSIVE THINKING: ICD-10-CM

## 2024-07-09 DIAGNOSIS — I10 PRIMARY HYPERTENSION: ICD-10-CM

## 2024-07-09 DIAGNOSIS — E11.65 TYPE 2 DIABETES MELLITUS WITH HYPERGLYCEMIA, WITHOUT LONG-TERM CURRENT USE OF INSULIN: Primary | ICD-10-CM

## 2024-07-09 DIAGNOSIS — M23.92 KNEE INTERNAL DERANGEMENT, LEFT: ICD-10-CM

## 2024-07-09 PROCEDURE — 1160F RVW MEDS BY RX/DR IN RCRD: CPT | Performed by: FAMILY MEDICINE

## 2024-07-09 PROCEDURE — 3079F DIAST BP 80-89 MM HG: CPT | Performed by: FAMILY MEDICINE

## 2024-07-09 PROCEDURE — 99214 OFFICE O/P EST MOD 30 MIN: CPT | Performed by: FAMILY MEDICINE

## 2024-07-09 PROCEDURE — 3044F HG A1C LEVEL LT 7.0%: CPT | Performed by: FAMILY MEDICINE

## 2024-07-09 PROCEDURE — 1125F AMNT PAIN NOTED PAIN PRSNT: CPT | Performed by: FAMILY MEDICINE

## 2024-07-09 PROCEDURE — 1159F MED LIST DOCD IN RCRD: CPT | Performed by: FAMILY MEDICINE

## 2024-07-09 PROCEDURE — 3074F SYST BP LT 130 MM HG: CPT | Performed by: FAMILY MEDICINE

## 2024-07-09 PROCEDURE — T1015 CLINIC SERVICE: HCPCS | Performed by: FAMILY MEDICINE

## 2024-07-09 RX ORDER — ESCITALOPRAM OXALATE 5 MG/1
5 TABLET ORAL EVERY MORNING
Qty: 90 TABLET | Refills: 1 | Status: SHIPPED | OUTPATIENT
Start: 2024-07-09

## 2024-07-09 NOTE — PROGRESS NOTES
Subjective   Ang Yoon is a 54 y.o. male.   Chief Complaint   Patient presents with    Knee Pain    Leg Pain    Diabetes       History of Present Illness   54 y.o. male with PMH as below presents to the office today primarily to follow-up on his diabetes, high blood pressure, other chronic medical problems.    Diabetes type 2-we have titrated up his Mounjaro.  Major issue there is consistency and availability.  Weight today is relatively stable at 302 pounds.  He has been lower than this when he was on Mounjaro last year.  Weight at last visit to monitor diabetes 3 months ago Was 305 pounds.  Last lipid panel was 1 year ago.  Last A1c 3 months ago was excellent at 5.9%.  Recent blood sugar numbers he had a lot of left knee pain.  I saw him about a month ago.    We did an MRI of his knee which showed severe internal degeneration.  I referred him to Ortho.  That appointment is still pending.  He is using a cane to get around, but is doing okay.  Not able to fully extend or flex his left knee.    History of coronary artery disease-he was getting short of breath.  I referred him back to cardiology and he had a nuclear stress test yesterday.  Ejection fraction was 60%.  Normal myocardial perfusion with no evidence of ischemia.    Finally, we talked at the last visit about his tendency to have recurring songs in his head incessantly to the point that it interferes with his ability to focus.  He was already on Cymbalta so I added Abilify at bedtime.  He states for a while that really helped him, he got the songs out of his head.  That has since worn off and he is back to the way he was before.        Patient Active Problem List    Diagnosis Date Noted    Other insomnia 01/28/2024    Type 2 diabetes mellitus with hyperglycemia, without long-term current use of insulin 09/17/2022    Cervical radiculopathy 08/17/2020    Lumbar degenerative disc disease 04/29/2020    Chronic midline low back pain with bilateral  sciatica 04/29/2020    Reactive airway disease with acute exacerbation 11/01/2019    Hyperlipidemia 07/11/2019    Class 2 severe obesity due to excess calories with serious comorbidity and body mass index (BMI) of 39.0 to 39.9 in adult 07/11/2019    Dyspnea 07/02/2019    GUILLERMINA (obstructive sleep apnea) 07/02/2019    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    Hypogonadism 06/13/2018    Other problems related to lifestyle 04/24/2018    Regular astigmatism 06/01/2017    Presbyopia 06/01/2017    Myopia 06/01/2017    Chronic coronary artery disease 09/19/2012    Cardiac disease 09/19/2012    Cyst of meniscus of knee 06/22/2012    Resting tremor 03/05/2012    Degenerative joint disease 02/08/2012    Hypertension 01/18/2012    Anxiety disorder 01/18/2012    Obsessive-compulsive disorder 01/18/2012    Memory loss 01/16/2012           Past Surgical History:   Procedure Laterality Date    ABLATION OF DYSRHYTHMIC FOCUS  2021 back    CARDIAC CATHETERIZATION  04/2018    KNEE ARTHROSCOPY Right 07/09/2012     Current Outpatient Medications on File Prior to Visit   Medication Sig    albuterol (PROVENTIL) (2.5 MG/3ML) 0.083% nebulizer solution USE 1 VIAL IN NEBULIZER EVERY 4 HOURS AS NEEDED FOR WHEEZING    albuterol sulfate  (90 Base) MCG/ACT inhaler Inhale 2 puffs Every 4 (Four) Hours As Needed for Wheezing.    amLODIPine (NORVASC) 5 MG tablet Take 1 tablet by mouth Daily.    celecoxib (CeleBREX) 200 MG capsule Take 1 capsule by mouth Daily.    cetirizine (ZyrTEC Allergy) 10 MG tablet Take 1 tablet by mouth Daily.    clobetasol (TEMOVATE) 0.05 % external solution Apply  topically to the appropriate area as directed 2 (Two) Times a Day.    DULoxetine (CYMBALTA) 60 MG capsule Take 1 capsule by mouth once daily    EQ Aspirin Adult Low Dose 81 MG EC tablet Take 1 tablet by mouth once daily    fluticasone (FLONASE) 50 MCG/ACT nasal spray USE 2 SPRAY(S) IN EACH NOSTRIL ONCE DAILY AS DIRECTED    glimepiride (AMARYL) 1 MG tablet TAKE 1  TABLET BY MOUTH ONCE DAILY IN THE MORNING BEFORE BREAKFAST    melatonin 5 MG tablet tablet Take 1 tablet by mouth At Night As Needed (insomnia).    metoprolol succinate XL (TOPROL-XL) 25 MG 24 hr tablet Take 0.5 tablets by mouth Daily.    naproxen sodium (ALEVE) 220 MG tablet Take 1 tablet by mouth 2 (Two) Times a Day As Needed.    rosuvastatin (CRESTOR) 20 MG tablet Take 1 tablet by mouth every night at bedtime.    Tirzepatide (MOUNJARO) 7.5 MG/0.5ML solution pen-injector pen Inject 0.5 mL under the skin into the appropriate area as directed 1 (One) Time Per Week.    traZODone (DESYREL) 50 MG tablet TAKE 1 TO 2 TABLETS BY MOUTH EVERY DAY AT BEDTIME AS NEEDED    [DISCONTINUED] ARIPiprazole (Abilify) 5 MG tablet Take 1 tablet by mouth Daily.    Blood Glucose Monitoring Suppl kit Use as directed to check blood glucose (Patient not taking: Reported on 6/5/2024)    glucose blood test strip Use as instructed to check blood glucose once daily (Patient not taking: Reported on 6/5/2024)    Lancets misc Use once daily with meter and strips to check blood glucose (Patient not taking: Reported on 6/5/2024)    nitroglycerin (Nitrostat) 0.4 MG SL tablet Place 1 tablet under the tongue Every 5 (Five) Minutes As Needed for Chest Pain. Take no more than 3 doses in 15 minutes.    [DISCONTINUED] Tirzepatide (MOUNJARO) 10 MG/0.5ML solution pen-injector pen Inject 0.5 mL under the skin into the appropriate area as directed 1 (One) Time Per Week. (Patient not taking: Reported on 7/9/2024)    [DISCONTINUED] Tirzepatide (MOUNJARO) 2.5 MG/0.5ML solution pen-injector pen Inject 0.5 mL under the skin into the appropriate area as directed 1 (One) Time Per Week. (Patient not taking: Reported on 6/5/2024)     No current facility-administered medications on file prior to visit.     Allergies   Allergen Reactions    Niacin Rash     Social History     Socioeconomic History    Marital status: Single    Number of children: 2   Tobacco Use    Smoking  "status: Former     Current packs/day: 0.00     Average packs/day: 0.3 packs/day for 3.0 years (0.8 ttl pk-yrs)     Types: Cigarettes     Start date: 1988     Quit date: 1991     Years since quittin.5     Passive exposure: Past    Smokeless tobacco: Never    Tobacco comments:     barely   Vaping Use    Vaping status: Never Used   Substance and Sexual Activity    Alcohol use: No    Drug use: No    Sexual activity: Yes     Partners: Female     Birth control/protection: Condom     Family History   Problem Relation Age of Onset    Hearing loss Mother     No Known Problems Father     No Known Problems Sister     Asthma Brother         childhood    No Known Problems Maternal Aunt     No Known Problems Maternal Uncle     No Known Problems Paternal Aunt     No Known Problems Paternal Uncle     Arthritis Maternal Grandmother     Heart disease Maternal Grandmother     Arthritis Maternal Grandfather     Diabetes Maternal Grandfather     Stroke Maternal Grandfather     Heart attack Maternal Grandfather     No Known Problems Paternal Grandmother     No Known Problems Paternal Grandfather     Asthma Daughter     Heart disease Other     Hypertension Other     Stroke Other     Prostate cancer Other     Anemia Neg Hx     Arrhythmia Neg Hx     Clotting disorder Neg Hx     Fainting Neg Hx     Heart failure Neg Hx     Hyperlipidemia Neg Hx        Review of Systems    Objective   /86 (BP Location: Right arm, Patient Position: Sitting, Cuff Size: Large Adult)   Pulse 84   Temp 97.3 °F (36.3 °C) (Infrared)   Resp 18   Ht 185.4 cm (73\")   Wt (!) 137 kg (302 lb 9.6 oz)   SpO2 96%   BMI 39.92 kg/m²   Physical Exam  Constitutional:       Appearance: He is well-developed.      Comments:      HENT:      Head: Normocephalic and atraumatic.   Eyes:      Conjunctiva/sclera: Conjunctivae normal.   Cardiovascular:      Rate and Rhythm: Normal rate.   Pulmonary:      Effort: Pulmonary effort is normal. No respiratory " distress.   Musculoskeletal:         General: Normal range of motion.      Cervical back: Normal range of motion.      Comments: Left knee has minor effusion.   There is crepitus, clunking to flexion and extension.  He is not able to fully extend or flex his left knee.    Skin:     General: Skin is warm and dry.      Findings: No rash.   Neurological:      Mental Status: He is alert and oriented to person, place, and time.      Gait: Gait abnormal (uses a cane).   Psychiatric:         Behavior: Behavior normal.           Hospital Outpatient Visit on 07/08/2024   Component Date Value Ref Range Status     CV STRESS PROTOCOL 1 07/08/2024 Pharmacologic   Final    Stage 1 07/08/2024 1.0   Final    Duration Min Stage 1 07/08/2024 0   Final    Duration Sec Stage 1 07/08/2024 10   Final    Stress Dose Regadenoson Stage 1 07/08/2024 0.40   Final    Stress Comments Stage 1 07/08/2024 10 sec bolus injection   Final    Baseline HR 07/08/2024 83  bpm Final    Baseline BP 07/08/2024 124/82  mmHg Final    Recovery HR 07/08/2024 110  bpm Final    Recovery BP 07/08/2024 118/74  mmHg Final    Target HR (85%) 07/08/2024 140  bpm Final    Max. Pred. HR (100%) 07/08/2024 165  bpm Final     CV REST NUCLEAR ISOTOPE DOSE 07/08/2024 10.5  mCi Final    BH CV STRESS NUCLEAR ISOTOPE DOSE 07/08/2024 31.5  mCi Final    Nuc Stress EF 07/08/2024 60  % Final   Office Visit on 04/09/2024   Component Date Value Ref Range Status    Creatinine, Urine 04/09/2024 204.9  Not Estab. mg/dL Final    Microalbumin, Urine 04/09/2024 7.4  Not Estab. ug/mL Final    Microalbumin/Creatinine Ratio 04/09/2024 4  0 - 29 mg/g creat Final    Comment:                        Normal:                0 -  29                         Moderately increased: 30 - 300                         Severely increased:       >300      Hemoglobin A1C 04/09/2024 5.9 (H)  4.8 - 5.6 % Final    Comment:          Prediabetes: 5.7 - 6.4           Diabetes: >6.4           Glycemic control for  adults with diabetes: <7.0      Glucose 04/09/2024 92  70 - 99 mg/dL Final    BUN 04/09/2024 16  6 - 24 mg/dL Final    Creatinine 04/09/2024 0.89  0.76 - 1.27 mg/dL Final    EGFR Result 04/09/2024 102  >59 mL/min/1.73 Final    BUN/Creatinine Ratio 04/09/2024 18  9 - 20 Final    Sodium 04/09/2024 142  134 - 144 mmol/L Final    Potassium 04/09/2024 5.2  3.5 - 5.2 mmol/L Final    Chloride 04/09/2024 102  96 - 106 mmol/L Final    Total CO2 04/09/2024 26  20 - 29 mmol/L Final    Calcium 04/09/2024 9.7  8.7 - 10.2 mg/dL Final    Total Protein 04/09/2024 7.1  6.0 - 8.5 g/dL Final    Albumin 04/09/2024 4.6  3.8 - 4.9 g/dL Final    Globulin 04/09/2024 2.5  1.5 - 4.5 g/dL Final    A/G Ratio 04/09/2024 1.8  1.2 - 2.2 Final    Total Bilirubin 04/09/2024 0.6  0.0 - 1.2 mg/dL Final    Alkaline Phosphatase 04/09/2024 59  44 - 121 IU/L Final    AST (SGOT) 04/09/2024 24  0 - 40 IU/L Final    ALT (SGPT) 04/09/2024 22  0 - 44 IU/L Final             Assessment & Plan   Diagnoses and all orders for this visit:    1. Type 2 diabetes mellitus with hyperglycemia, without long-term current use of insulin (Primary)  -     Hemoglobin A1c    2. Primary hypertension  -     CBC & Differential  -     Comprehensive Metabolic Panel    3. Knee internal derangement, left    4. Chronic coronary artery disease    5. Cervical radiculopathy    6. Lumbar degenerative disc disease    7. Need for hepatitis C screening test  -     Hepatitis C Antibody    8. Mixed hyperlipidemia  -     Lipid Panel    9. Obsessive thinking  -     escitalopram (Lexapro) 5 MG tablet; Take 1 tablet by mouth Every Morning.  Dispense: 90 tablet; Refill: 1    Status of multiple chronic medical problems reviewed today as above.  Will get labs to monitor status of his diabetes and high cholesterol.  Electrolytes to monitor renal function.  Blood pressure control is excellent at 120/86.  Continue amlodipine 5 mg/day, Toprol-XL 25 mg/day.  Keep follow-up with Ortho regarding his left  "knee.  Until then, keep the knee wrapped tightly for extra support.  Apparently he is having more chest pain since nitroglycerin was stopped by previous cardiologist.  He is going to contact Dr. Nugent about this.  Chronic back and neck problems are relatively stable.  We talked again about the \"ear worms\" that he has been dealing with.  He reports that there is bit of swelling in his head now for several weeks that is driving him nuts.  It is song he did not really like even in the past.  I think we should stop the Abilify as it was not effective maintaining remission of this bothersome phenomenon.  We talked about the fact that SSRIs are the most commonly recommended treatment.  He is already on Cymbalta, so we will add a low-dose of Lexapro to augment the serotonin boost of the Cymbalta.  I will follow-up with him when the labs are back and we will see him again in 3 months or sooner if needed.        Call with any problems or concerns before next visit       Return in about 3 months (around 10/9/2024).      Much of this report is an electronic transcription of spoken language to printed text using Dragon dictation software.  As such, the subtleties and finesse of spoken language may permit erroneous, or at times, nonsensical words or phrases to be inadvertently transcribed; thus changes may be made at a later date to rectify these errors.     Traci Bass MD7/9/202419:48 EDT  This note has been electronically signed  "

## 2024-07-10 LAB
ALBUMIN SERPL-MCNC: 4.6 G/DL (ref 3.8–4.9)
ALP SERPL-CCNC: 64 IU/L (ref 44–121)
ALT SERPL-CCNC: 22 IU/L (ref 0–44)
AST SERPL-CCNC: 23 IU/L (ref 0–40)
BASOPHILS # BLD AUTO: 0.1 X10E3/UL (ref 0–0.2)
BASOPHILS NFR BLD AUTO: 1 %
BILIRUB SERPL-MCNC: 0.6 MG/DL (ref 0–1.2)
BUN SERPL-MCNC: 14 MG/DL (ref 6–24)
BUN/CREAT SERPL: 16 (ref 9–20)
CALCIUM SERPL-MCNC: 9.3 MG/DL (ref 8.7–10.2)
CHLORIDE SERPL-SCNC: 101 MMOL/L (ref 96–106)
CHOLEST SERPL-MCNC: 128 MG/DL (ref 100–199)
CO2 SERPL-SCNC: 25 MMOL/L (ref 20–29)
CREAT SERPL-MCNC: 0.89 MG/DL (ref 0.76–1.27)
EGFRCR SERPLBLD CKD-EPI 2021: 101 ML/MIN/1.73
EOSINOPHIL # BLD AUTO: 0.2 X10E3/UL (ref 0–0.4)
EOSINOPHIL NFR BLD AUTO: 2 %
ERYTHROCYTE [DISTWIDTH] IN BLOOD BY AUTOMATED COUNT: 13.1 % (ref 11.6–15.4)
GLOBULIN SER CALC-MCNC: 2.5 G/DL (ref 1.5–4.5)
GLUCOSE SERPL-MCNC: 96 MG/DL (ref 70–99)
HBA1C MFR BLD: 5.8 % (ref 4.8–5.6)
HCT VFR BLD AUTO: 50.8 % (ref 37.5–51)
HCV IGG SERPL QL IA: NON REACTIVE
HDLC SERPL-MCNC: 50 MG/DL
HGB BLD-MCNC: 16.7 G/DL (ref 13–17.7)
IMM GRANULOCYTES # BLD AUTO: 0 X10E3/UL (ref 0–0.1)
IMM GRANULOCYTES NFR BLD AUTO: 0 %
LDLC SERPL CALC-MCNC: 55 MG/DL (ref 0–99)
LYMPHOCYTES # BLD AUTO: 1.9 X10E3/UL (ref 0.7–3.1)
LYMPHOCYTES NFR BLD AUTO: 26 %
MCH RBC QN AUTO: 30.9 PG (ref 26.6–33)
MCHC RBC AUTO-ENTMCNC: 32.9 G/DL (ref 31.5–35.7)
MCV RBC AUTO: 94 FL (ref 79–97)
MONOCYTES # BLD AUTO: 0.5 X10E3/UL (ref 0.1–0.9)
MONOCYTES NFR BLD AUTO: 7 %
NEUTROPHILS # BLD AUTO: 4.8 X10E3/UL (ref 1.4–7)
NEUTROPHILS NFR BLD AUTO: 64 %
PLATELET # BLD AUTO: 152 X10E3/UL (ref 150–450)
POTASSIUM SERPL-SCNC: 4.8 MMOL/L (ref 3.5–5.2)
PROT SERPL-MCNC: 7.1 G/DL (ref 6–8.5)
RBC # BLD AUTO: 5.4 X10E6/UL (ref 4.14–5.8)
SODIUM SERPL-SCNC: 139 MMOL/L (ref 134–144)
TRIGL SERPL-MCNC: 133 MG/DL (ref 0–149)
VLDLC SERPL CALC-MCNC: 23 MG/DL (ref 5–40)
WBC # BLD AUTO: 7.5 X10E3/UL (ref 3.4–10.8)

## 2024-07-23 DIAGNOSIS — E11.65 TYPE 2 DIABETES MELLITUS WITH HYPERGLYCEMIA, WITHOUT LONG-TERM CURRENT USE OF INSULIN: Primary | ICD-10-CM

## 2024-08-07 RX ORDER — ALBUTEROL SULFATE 2.5 MG/3ML
SOLUTION RESPIRATORY (INHALATION)
Qty: 300 ML | Refills: 1 | Status: SHIPPED | OUTPATIENT
Start: 2024-08-07

## 2024-08-21 DIAGNOSIS — J45.41 MODERATE PERSISTENT REACTIVE AIRWAY DISEASE WITH ACUTE EXACERBATION: ICD-10-CM

## 2024-08-21 DIAGNOSIS — R06.02 SHORTNESS OF BREATH: ICD-10-CM

## 2024-08-21 RX ORDER — ALBUTEROL SULFATE 90 UG/1
2 AEROSOL, METERED RESPIRATORY (INHALATION) EVERY 4 HOURS PRN
Qty: 18 G | Refills: 3 | Status: SHIPPED | OUTPATIENT
Start: 2024-08-21

## 2024-08-21 NOTE — TELEPHONE ENCOUNTER
Caller: Ang Yoon    Relationship: Self    Best call back number:     [phone]       Requested Prescriptions:   Requested Prescriptions     Pending Prescriptions Disp Refills    albuterol sulfate  (90 Base) MCG/ACT inhaler 18 g 3     Sig: Inhale 2 puffs Every 4 (Four) Hours As Needed for Wheezing.        Pharmacy where request should be sent: [facility] [address] [place], [place] [phone] [place] [phone]      Last office visit with prescribing clinician: 7/9/2024   Last telemedicine visit with prescribing clinician: Visit date not found   Next office visit with prescribing clinician: 10/9/2024     Additional details provided by patient:     Does the patient have less than a 3 day supply:  [x] Yes  [] No    Would you like a call back once the refill request has been completed: [] Yes [] No    If the office needs to give you a call back, can they leave a voicemail: [] Yes [] No    Blayne Meng Rep   08/21/24 10:52 EDT

## 2024-10-09 ENCOUNTER — OFFICE VISIT (OUTPATIENT)
Dept: FAMILY MEDICINE CLINIC | Facility: CLINIC | Age: 54
End: 2024-10-09
Payer: COMMERCIAL

## 2024-10-09 VITALS
RESPIRATION RATE: 18 BRPM | BODY MASS INDEX: 38.49 KG/M2 | HEIGHT: 73 IN | TEMPERATURE: 97.3 F | HEART RATE: 88 BPM | OXYGEN SATURATION: 94 % | DIASTOLIC BLOOD PRESSURE: 87 MMHG | WEIGHT: 290.4 LBS | SYSTOLIC BLOOD PRESSURE: 131 MMHG

## 2024-10-09 DIAGNOSIS — E78.2 MIXED HYPERLIPIDEMIA: ICD-10-CM

## 2024-10-09 DIAGNOSIS — G47.09 OTHER INSOMNIA: ICD-10-CM

## 2024-10-09 DIAGNOSIS — M23.92 KNEE INTERNAL DERANGEMENT, LEFT: ICD-10-CM

## 2024-10-09 DIAGNOSIS — I10 PRIMARY HYPERTENSION: Primary | ICD-10-CM

## 2024-10-09 DIAGNOSIS — M51.362 DEGENERATION OF INTERVERTEBRAL DISC OF LUMBAR REGION WITH DISCOGENIC BACK PAIN AND LOWER EXTREMITY PAIN: ICD-10-CM

## 2024-10-09 DIAGNOSIS — E11.65 TYPE 2 DIABETES MELLITUS WITH HYPERGLYCEMIA, WITHOUT LONG-TERM CURRENT USE OF INSULIN: ICD-10-CM

## 2024-10-09 DIAGNOSIS — E29.1 HYPOGONADISM IN MALE: ICD-10-CM

## 2024-10-09 DIAGNOSIS — F41.1 GENERALIZED ANXIETY DISORDER: ICD-10-CM

## 2024-10-09 DIAGNOSIS — I25.10 CHRONIC CORONARY ARTERY DISEASE: ICD-10-CM

## 2024-10-09 PROCEDURE — 99214 OFFICE O/P EST MOD 30 MIN: CPT | Performed by: FAMILY MEDICINE

## 2024-10-09 NOTE — PROGRESS NOTES
Answers submitted by the patient for this visit:  Other (Submitted on 10/8/2024)  Please describe your symptoms.: follow up visit., , ask about testosterone issues.  Have you had these symptoms before?: Yes  How long have you been having these symptoms?: Greater than 2 weeks  Primary Reason for Visit (Submitted on 10/8/2024)  What is the primary reason for your visit?: Problem Not Listed  Subjective   Ang Yoon is a 54 y.o. male.   Chief Complaint   Patient presents with    Diabetes    Hypertension    Hyperlipidemia    Coronary Artery Disease       History of Present Illness   54 y.o. male presents to the office today  to follow-up on his diabetes, high blood pressure, other chronic medical problems.     Diabetes type 2-A1c 3 months ago was excellent at 5.8%.  We have titrated up his Mounjaro.  Major issue there is consistency and availability.  Continues to tolerate it well.  Weight 3 months ago was 302 pounds.  Weight today is    HTN -on several medicines including amlodipine, metoprolol.    Left knee severe internal degeneration on MRI - July 2024.  I referred him to Ortho.  Saw DR. Hunter -  He was given an  brace and it looks like they discussed having surgery within the next several months.  He is still using a cane to get around, but is doing okay.  Not able to fully extend or flex his left knee.     History of coronary artery disease-he was getting short of breath.  I referred him back to cardiology and he had a nuclear stress test 3 months ago.  Ejection fraction was 60%.  Normal myocardial perfusion with no evidence of ischemia.  He remains on Crestor 20 mg/day.  Last lipid panel was 3 months ago.    Insomnia -takes trazodone, sometimes melatonin at bedtime.       Asks today about testosterone      History of Present Illness  The patient presents for evaluation of diabetes and weight management.    He reports a stable weight of approximately 285 to 290 pounds, with no significant changes  recently. He is currently on a regimen of Mounjaro 10 mg. He expresses a desire to increase the dosage to 12.5 mg per week, as he has noticed a decrease in its effectiveness after two months at the current dosage. He also mentions that his appetite has not significantly increased. He has developed a self-check system to determine if his hunger is physical or psychological, as he can usually distinguish between the two. He believes that losing an additional 40 to 50 pounds would make a significant difference in his overall health.    He recalls being prescribed testosterone byDonn Mayer due to low levels detected in his system. Despite continued testing, his testosterone levels remained unchanged. He is considering whether this is something he should be concerned about as he ages. He also mentions feeling nervous about introducing extra testosterone into his system, as it seemed contrary to his body's natural processes.    He notes that he discontinued Abilify after a two-week trial due to undesirable changes in his mental state, including a lack of motivation and emotional numbness. He has found that sensory distractions, such as watching documentaries while working, help him focus better.      Patient Active Problem List    Diagnosis Date Noted    Other insomnia 01/28/2024    Type 2 diabetes mellitus with hyperglycemia, without long-term current use of insulin 09/17/2022    Cervical radiculopathy 08/17/2020    Lumbar degenerative disc disease 04/29/2020    Chronic midline low back pain with bilateral sciatica 04/29/2020    Reactive airway disease with acute exacerbation 11/01/2019    Hyperlipidemia 07/11/2019    Class 2 severe obesity due to excess calories with serious comorbidity and body mass index (BMI) of 39.0 to 39.9 in adult 07/11/2019    Dyspnea 07/02/2019    GUILLERMINA (obstructive sleep apnea) 07/02/2019    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    Hypogonadism 06/13/2018    Other problems related to lifestyle  04/24/2018    Regular astigmatism 06/01/2017    Presbyopia 06/01/2017    Myopia 06/01/2017    Chronic coronary artery disease 09/19/2012    Cardiac disease 09/19/2012    Cyst of meniscus of knee 06/22/2012    Resting tremor 03/05/2012    Degenerative joint disease 02/08/2012    Hypertension 01/18/2012    Anxiety disorder 01/18/2012    Obsessive-compulsive disorder 01/18/2012    Memory loss 01/16/2012           Past Surgical History:   Procedure Laterality Date    ABLATION OF DYSRHYTHMIC FOCUS  2021 back    CARDIAC CATHETERIZATION  04/2018    KNEE ARTHROSCOPY Right 07/09/2012     Current Outpatient Medications on File Prior to Visit   Medication Sig    albuterol (PROVENTIL) (2.5 MG/3ML) 0.083% nebulizer solution USE 1 VIAL IN NEBULIZER EVERY 4 HOURS AS NEEDED FOR WHEEZING    albuterol sulfate  (90 Base) MCG/ACT inhaler Inhale 2 puffs Every 4 (Four) Hours As Needed for Wheezing.    amLODIPine (NORVASC) 5 MG tablet Take 1 tablet by mouth Daily.    Blood Glucose Monitoring Suppl kit Use as directed to check blood glucose    celecoxib (CeleBREX) 200 MG capsule Take 1 capsule by mouth Daily.    cetirizine (ZyrTEC Allergy) 10 MG tablet Take 1 tablet by mouth Daily.    clobetasol (TEMOVATE) 0.05 % external solution Apply  topically to the appropriate area as directed 2 (Two) Times a Day.    DULoxetine (CYMBALTA) 60 MG capsule Take 1 capsule by mouth once daily    EQ Aspirin Adult Low Dose 81 MG EC tablet Take 1 tablet by mouth once daily    escitalopram (Lexapro) 5 MG tablet Take 1 tablet by mouth Every Morning.    fluticasone (FLONASE) 50 MCG/ACT nasal spray USE 2 SPRAY(S) IN EACH NOSTRIL ONCE DAILY AS DIRECTED    glimepiride (AMARYL) 1 MG tablet TAKE 1 TABLET BY MOUTH ONCE DAILY IN THE MORNING BEFORE BREAKFAST    glucose blood test strip Use as instructed to check blood glucose once daily    Lancets misc Use once daily with meter and strips to check blood glucose    melatonin 5 MG tablet tablet Take 1 tablet by  mouth At Night As Needed (insomnia).    metoprolol succinate XL (TOPROL-XL) 25 MG 24 hr tablet Take 0.5 tablets by mouth Daily.    naproxen sodium (ALEVE) 220 MG tablet Take 1 tablet by mouth 2 (Two) Times a Day As Needed.    nitroglycerin (Nitrostat) 0.4 MG SL tablet Place 1 tablet under the tongue Every 5 (Five) Minutes As Needed for Chest Pain. Take no more than 3 doses in 15 minutes.    rosuvastatin (CRESTOR) 20 MG tablet Take 1 tablet by mouth every night at bedtime.    traZODone (DESYREL) 50 MG tablet TAKE 1 TO 2 TABLETS BY MOUTH EVERY DAY AT BEDTIME AS NEEDED    [DISCONTINUED] Tirzepatide (MOUNJARO) 10 MG/0.5ML solution pen-injector pen Inject 0.5 mL under the skin into the appropriate area as directed 1 (One) Time Per Week.     No current facility-administered medications on file prior to visit.     Allergies   Allergen Reactions    Niacin Rash     Social History     Socioeconomic History    Marital status: Single    Number of children: 2   Tobacco Use    Smoking status: Former     Current packs/day: 0.00     Average packs/day: 0.3 packs/day for 3.0 years (0.8 ttl pk-yrs)     Types: Cigarettes     Start date: 1988     Quit date: 1991     Years since quittin.7     Passive exposure: Past    Smokeless tobacco: Never    Tobacco comments:     barely   Vaping Use    Vaping status: Never Used   Substance and Sexual Activity    Alcohol use: No    Drug use: No    Sexual activity: Yes     Partners: Female     Birth control/protection: Condom     Family History   Problem Relation Age of Onset    Hearing loss Mother     No Known Problems Father     No Known Problems Sister     Asthma Brother         childhood    No Known Problems Maternal Aunt     No Known Problems Maternal Uncle     No Known Problems Paternal Aunt     No Known Problems Paternal Uncle     Arthritis Maternal Grandmother     Heart disease Maternal Grandmother     Arthritis Maternal Grandfather     Diabetes Maternal Grandfather     Stroke  "Maternal Grandfather     Heart attack Maternal Grandfather     No Known Problems Paternal Grandmother     No Known Problems Paternal Grandfather     Asthma Daughter     Heart disease Other     Hypertension Other     Stroke Other     Prostate cancer Other     Anemia Neg Hx     Arrhythmia Neg Hx     Clotting disorder Neg Hx     Fainting Neg Hx     Heart failure Neg Hx     Hyperlipidemia Neg Hx        Review of Systems    Objective   /87 (BP Location: Right arm, Patient Position: Sitting, Cuff Size: Large Adult)   Pulse 88   Temp 97.3 °F (36.3 °C) (Infrared)   Resp 18   Ht 185.4 cm (73\")   Wt 132 kg (290 lb 6.4 oz)   SpO2 94%   BMI 38.31 kg/m²   Physical Exam  Constitutional:       Appearance: He is well-developed, well-groomed and overweight.      Comments: Pleasant, soft-spoken.     HENT:      Head: Normocephalic and atraumatic.   Eyes:      Conjunctiva/sclera: Conjunctivae normal.   Cardiovascular:      Rate and Rhythm: Normal rate.   Pulmonary:      Effort: Pulmonary effort is normal.   Musculoskeletal:         General: Normal range of motion.      Cervical back: Normal range of motion.   Skin:     General: Skin is warm and dry.      Findings: No rash.   Neurological:      Mental Status: He is alert and oriented to person, place, and time.   Psychiatric:         Behavior: Behavior normal.       Physical Exam        Office Visit on 07/09/2024   Component Date Value Ref Range Status    Hemoglobin A1C 07/09/2024 5.8 (H)  4.8 - 5.6 % Final    Comment:          Prediabetes: 5.7 - 6.4           Diabetes: >6.4           Glycemic control for adults with diabetes: <7.0      WBC 07/09/2024 7.5  3.4 - 10.8 x10E3/uL Final    RBC 07/09/2024 5.40  4.14 - 5.80 x10E6/uL Final    Hemoglobin 07/09/2024 16.7  13.0 - 17.7 g/dL Final    Hematocrit 07/09/2024 50.8  37.5 - 51.0 % Final    MCV 07/09/2024 94  79 - 97 fL Final    MCH 07/09/2024 30.9  26.6 - 33.0 pg Final    MCHC 07/09/2024 32.9  31.5 - 35.7 g/dL Final    RDW " 07/09/2024 13.1  11.6 - 15.4 % Final    Platelets 07/09/2024 152  150 - 450 x10E3/uL Final    Neutrophil Rel % 07/09/2024 64  Not Estab. % Final    Lymphocyte Rel % 07/09/2024 26  Not Estab. % Final    Monocyte Rel % 07/09/2024 7  Not Estab. % Final    Eosinophil Rel % 07/09/2024 2  Not Estab. % Final    Basophil Rel % 07/09/2024 1  Not Estab. % Final    Neutrophils Absolute 07/09/2024 4.8  1.4 - 7.0 x10E3/uL Final    Lymphocytes Absolute 07/09/2024 1.9  0.7 - 3.1 x10E3/uL Final    Monocytes Absolute 07/09/2024 0.5  0.1 - 0.9 x10E3/uL Final    Eosinophils Absolute 07/09/2024 0.2  0.0 - 0.4 x10E3/uL Final    Basophils Absolute 07/09/2024 0.1  0.0 - 0.2 x10E3/uL Final    Immature Granulocyte Rel % 07/09/2024 0  Not Estab. % Final    Immature Grans Absolute 07/09/2024 0.0  0.0 - 0.1 x10E3/uL Final    Glucose 07/09/2024 96  70 - 99 mg/dL Final    BUN 07/09/2024 14  6 - 24 mg/dL Final    Creatinine 07/09/2024 0.89  0.76 - 1.27 mg/dL Final    EGFR Result 07/09/2024 101  >59 mL/min/1.73 Final    BUN/Creatinine Ratio 07/09/2024 16  9 - 20 Final    Sodium 07/09/2024 139  134 - 144 mmol/L Final    Potassium 07/09/2024 4.8  3.5 - 5.2 mmol/L Final    Chloride 07/09/2024 101  96 - 106 mmol/L Final    Total CO2 07/09/2024 25  20 - 29 mmol/L Final    Calcium 07/09/2024 9.3  8.7 - 10.2 mg/dL Final    Total Protein 07/09/2024 7.1  6.0 - 8.5 g/dL Final    Albumin 07/09/2024 4.6  3.8 - 4.9 g/dL Final    Globulin 07/09/2024 2.5  1.5 - 4.5 g/dL Final    Total Bilirubin 07/09/2024 0.6  0.0 - 1.2 mg/dL Final    Alkaline Phosphatase 07/09/2024 64  44 - 121 IU/L Final    AST (SGOT) 07/09/2024 23  0 - 40 IU/L Final    ALT (SGPT) 07/09/2024 22  0 - 44 IU/L Final    Total Cholesterol 07/09/2024 128  100 - 199 mg/dL Final    Triglycerides 07/09/2024 133  0 - 149 mg/dL Final    HDL Cholesterol 07/09/2024 50  >39 mg/dL Final    VLDL Cholesterol Jovanny 07/09/2024 23  5 - 40 mg/dL Final    LDL Chol Calc ([facility]) 07/09/2024 55  0 - 99 mg/dL Final    Hep C  Virus Ab 07/09/2024 Non Reactive  Non Reactive Final    Comment: HCV antibody alone does not differentiate between previously  resolved infection and active infection. Equivocal and Reactive  HCV antibody results should be followed up with an HCV RNA test  to support the diagnosis of active HCV infection.     Hospital Outpatient Visit on 07/08/2024   Component Date Value Ref Range Status     CV STRESS PROTOCOL 1 07/08/2024 Pharmacologic   Final    Stage 1 07/08/2024 1.0   Final    Duration Min Stage 1 07/08/2024 0   Final    Duration Sec Stage 1 07/08/2024 10   Final    Stress Dose Regadenoson Stage 1 07/08/2024 0.40   Final    Stress Comments Stage 1 07/08/2024 10 sec bolus injection   Final    Baseline HR 07/08/2024 83  bpm Final    Baseline BP 07/08/2024 124/82  mmHg Final    Recovery HR 07/08/2024 110  bpm Final    Recovery BP 07/08/2024 118/74  mmHg Final    Target HR (85%) 07/08/2024 140  bpm Final    Max. Pred. HR (100%) 07/08/2024 165  bpm Final     CV REST NUCLEAR ISOTOPE DOSE 07/08/2024 10.5  mCi Final    BH CV STRESS NUCLEAR ISOTOPE DOSE 07/08/2024 31.5  mCi Final    Nuc Stress EF 07/08/2024 60  % Final     Results  Laboratory Studies  Testosterone level was 156 and 132 six years ago.          Assessment & Plan   Diagnoses and all orders for this visit:    1. Primary hypertension (Primary)    2. Chronic coronary artery disease    3. Mixed hyperlipidemia    4. Type 2 diabetes mellitus with hyperglycemia, without long-term current use of insulin  -     Tirzepatide (MOUNJARO) 12.5 MG/0.5ML solution pen-injector pen; Inject 0.5 mL under the skin into the appropriate area as directed 1 (One) Time Per Week.  Dispense: 2 mL; Refill: 0  -     Hemoglobin A1c  -     Comprehensive Metabolic Panel    5. Knee internal derangement, left    6. Other insomnia    7. Hypogonadism in male  -     Testosterone    8. Degeneration of intervertebral disc of lumbar region with discogenic back pain and lower extremity  pain    9. Generalized anxiety disorder      Assessment & Plan  Status of multiple chronic medical problems reviewed today as above.  Blood pressure control is excellent at 131/87.  Continue amlodipine 5 mg/day, Toprol-XL 12.5 mg/day for now.  Asymptomatic with regard to coronary artery disease.  Continue statin, aspirin, beta-blocker.  Continue Crestor 20 mg/day as lipid panel is excellent.  Continue to monitor knee pain.  Keep follow-up with the surgeon.  He anticipates having his left knee replaced in 3 months or so.  Mood is good on current dose of Cymbalta and Lexapro.  That also helps his chronic low back pain some.  No changes there.  Increase Mounjaro to 12.5 mg/week.  Check labs to monitor status of his diabetes.      1. Weight management.  He reports his weight has stabilized at around 285-290 lbs. The dosage of Mounjaro will be increased to 12.5 mg per week, with the plan to further increase to 15 mg per week in December. He will continue to monitor his appetite and weight. If the effects of the medication begin to wane, the dosage will be adjusted accordingly. Blood work, including A1c and testosterone levels, will be checked today, and he will be informed of the results.    2. Medication Management.  He discontinued Abilify after two weeks due to adverse effects, including lack of drive and numbness. He reports no longer taking the medication and has returned to using sensory distractions, such as playing documentaries, to manage his recurrent earworm sx.    3. Low testosterone.  His testosterone levels were recorded as 156 and 132 six years ago, which are below the normal range (200 to 250). The potential benefits and risks of testosterone replacement therapy were discussed. It was explained that testosterone replacement could enhance erectile function if he experiences erectile dysfunction and his testosterone level is low. However, if his erectile function is normal, treatment may not necessarily  improve his condition. The risks associated with testosterone replacement therapy, such as increased blood cell production leading to thicker blood and potential plaque buildup in arteries, were discussed. This could potentially slow down blood flow to the brain or heart, increasing the risk of stroke and heart attack. Additionally, while testosterone does not cause prostate cancer, it could potentially accelerate the growth of undiagnosed cancer. His testosterone level will be checked today, and further discussion will be based on the results.    Follow-up  Return in 3 months for follow up.        Call with any problems or concerns before next visit       Return in about 3 months (around 1/9/2025).  Patient or patient representative verbalized consent for the use of Ambient Listening during the visit with  Traci Bass MD for chart documentation. 10/9/2024  16:50 EDT    Much of this report is an electronic transcription of spoken language to printed text using Dragon dictation software.  As such, the subtleties and finesse of spoken language may permit erroneous, or at times, nonsensical words or phrases to be inadvertently transcribed; thus changes may be made at a later date to rectify these errors.     Traci Bass [id number] EDT  This note has been electronically signed

## 2024-10-10 LAB
ALBUMIN SERPL-MCNC: 4.5 G/DL (ref 3.8–4.9)
ALP SERPL-CCNC: 53 IU/L (ref 44–121)
ALT SERPL-CCNC: 23 IU/L (ref 0–44)
AST SERPL-CCNC: 26 IU/L (ref 0–40)
BILIRUB SERPL-MCNC: 1 MG/DL (ref 0–1.2)
BUN SERPL-MCNC: 15 MG/DL (ref 6–24)
BUN/CREAT SERPL: 16 (ref 9–20)
CALCIUM SERPL-MCNC: 9.5 MG/DL (ref 8.7–10.2)
CHLORIDE SERPL-SCNC: 103 MMOL/L (ref 96–106)
CO2 SERPL-SCNC: 23 MMOL/L (ref 20–29)
CREAT SERPL-MCNC: 0.91 MG/DL (ref 0.76–1.27)
EGFRCR SERPLBLD CKD-EPI 2021: 100 ML/MIN/1.73
GLOBULIN SER CALC-MCNC: 2.2 G/DL (ref 1.5–4.5)
GLUCOSE SERPL-MCNC: 82 MG/DL (ref 70–99)
HBA1C MFR BLD: 5.6 % (ref 4.8–5.6)
POTASSIUM SERPL-SCNC: 4.5 MMOL/L (ref 3.5–5.2)
PROT SERPL-MCNC: 6.7 G/DL (ref 6–8.5)
SODIUM SERPL-SCNC: 140 MMOL/L (ref 134–144)
TESTOST SERPL-MCNC: 208 NG/DL (ref 264–916)

## 2024-12-06 DIAGNOSIS — M23.92 KNEE INTERNAL DERANGEMENT, LEFT: ICD-10-CM

## 2024-12-06 RX ORDER — CELECOXIB 200 MG/1
200 CAPSULE ORAL DAILY
Qty: 30 CAPSULE | Refills: 5 | Status: SHIPPED | OUTPATIENT
Start: 2024-12-06

## 2025-02-08 DIAGNOSIS — G89.29 CHRONIC MIDLINE LOW BACK PAIN WITH BILATERAL SCIATICA: ICD-10-CM

## 2025-02-08 DIAGNOSIS — M54.41 CHRONIC MIDLINE LOW BACK PAIN WITH BILATERAL SCIATICA: ICD-10-CM

## 2025-02-08 DIAGNOSIS — M54.42 CHRONIC MIDLINE LOW BACK PAIN WITH BILATERAL SCIATICA: ICD-10-CM

## 2025-02-10 RX ORDER — DULOXETIN HYDROCHLORIDE 60 MG/1
60 CAPSULE, DELAYED RELEASE ORAL DAILY
Qty: 30 CAPSULE | Refills: 5 | Status: SHIPPED | OUTPATIENT
Start: 2025-02-10

## 2025-02-12 ENCOUNTER — OFFICE VISIT (OUTPATIENT)
Dept: FAMILY MEDICINE CLINIC | Facility: CLINIC | Age: 55
End: 2025-02-12
Payer: COMMERCIAL

## 2025-02-12 VITALS
HEART RATE: 83 BPM | RESPIRATION RATE: 18 BRPM | DIASTOLIC BLOOD PRESSURE: 83 MMHG | BODY MASS INDEX: 34.67 KG/M2 | TEMPERATURE: 97.8 F | OXYGEN SATURATION: 95 % | HEIGHT: 73 IN | WEIGHT: 261.6 LBS | SYSTOLIC BLOOD PRESSURE: 115 MMHG

## 2025-02-12 DIAGNOSIS — Z12.11 COLON CANCER SCREENING: ICD-10-CM

## 2025-02-12 DIAGNOSIS — G47.09 OTHER INSOMNIA: ICD-10-CM

## 2025-02-12 DIAGNOSIS — M54.42 CHRONIC MIDLINE LOW BACK PAIN WITH BILATERAL SCIATICA: ICD-10-CM

## 2025-02-12 DIAGNOSIS — E11.65 TYPE 2 DIABETES MELLITUS WITH HYPERGLYCEMIA, WITHOUT LONG-TERM CURRENT USE OF INSULIN: Primary | ICD-10-CM

## 2025-02-12 DIAGNOSIS — F41.1 GENERALIZED ANXIETY DISORDER: ICD-10-CM

## 2025-02-12 DIAGNOSIS — I10 PRIMARY HYPERTENSION: ICD-10-CM

## 2025-02-12 DIAGNOSIS — E78.2 MIXED HYPERLIPIDEMIA: ICD-10-CM

## 2025-02-12 DIAGNOSIS — G89.29 CHRONIC MIDLINE LOW BACK PAIN WITH BILATERAL SCIATICA: ICD-10-CM

## 2025-02-12 DIAGNOSIS — J01.00 SUBACUTE MAXILLARY SINUSITIS: ICD-10-CM

## 2025-02-12 DIAGNOSIS — Z80.0 FAMILY HISTORY OF COLON CANCER: ICD-10-CM

## 2025-02-12 DIAGNOSIS — M54.41 CHRONIC MIDLINE LOW BACK PAIN WITH BILATERAL SCIATICA: ICD-10-CM

## 2025-02-12 PROCEDURE — 87428 SARSCOV & INF VIR A&B AG IA: CPT | Performed by: FAMILY MEDICINE

## 2025-02-12 PROCEDURE — 99214 OFFICE O/P EST MOD 30 MIN: CPT | Performed by: FAMILY MEDICINE

## 2025-02-12 RX ORDER — TIRZEPATIDE 15 MG/.5ML
15 INJECTION, SOLUTION SUBCUTANEOUS WEEKLY
COMMUNITY
Start: 2025-01-29

## 2025-02-12 NOTE — PROGRESS NOTES
Subjective   Ang Yoon is a 54 y.o. male.   Chief Complaint   Patient presents with    Nose Bleed    Headache    Facial Pain       History of Present Illness   54 y.o. male presents to the office today to follow-up on chronic medical problems for active problems as below.  Last visit was 4 months ago.  Last lab work was 4 months ago.    Diabetes type 2-last A1c was excellent at 5.6%.  He is being treated with Mounjaro and we have titrated him up to 15mg per week.  He continues to benefit from this and is tolerating it without side effects.  This helps his back pain, mood.  He has also noticed that since he has lost weight his back hurts less.  HLD-last lipid panel was 6 months ago and it was good.    Chronic back pain-takes Cymbalta.    When he gets here he tells us that he has had headaches, facial pain and nosebleeds for 12 days.  Was cleaning out his shop, blew on a filter that has silica glass beads in it and some of it came back in he thinks it had metal in it cut his sinuses.    Weight today is 261 pounds.  He was 290 pounds back in October.  History of Present Illness  The patient presents for evaluation of sinus issues, weight management, back pain, and preventative care.    Approximately 1 week ago, he was exposed to a significant amount of silicone glass beads while handling old equipment, which he inadvertently inhaled. This incident resulted in severe irritation of his sinuses, characterized by nosebleeds, intermittent rhinorrhea, and facial pain, particularly around the eyes and cheeks. He does not believe the foreign material reached his chest as he did not experience any associated sore throat. He has been using a Navage nasal irrigation system twice daily, which has been yielding blood and scabbed plugs. He has not sought consultation with an otolaryngologist. He also reports a history of frequent nasal fractures due to boxing in his youth.    He has been maintaining a steady supply of  Mounjaro, which he reports as being effective. He has experienced a weight loss of 30 pounds since his last visit, attributed to dietary modifications including the elimination of processed foods and increased physical activity, walking at least 1 mile daily. He has been on a 15 mg dose of Mounjaro for several months. He is currently exploring his ideal weight target, with a height of 6 feet 1 inch and a current weight of 260 pounds. He recalls weighing between 220 to 225 pounds during his high school years when he was actively running. He has also noted a decrease in his pant size from 48 to 36. He reports no consumption of sugary foods or sodas.    He reports an improvement in his back pain, although he has noticed a recurrence of pain in the area where he previously underwent ablation. He describes the pain as muscular or nerve-related and believes he can manage it for a while longer.    He expresses interest in undergoing a colonoscopy, having never had one before. He completed a Cologuard test a few years ago. He has a family history of colon cancer, with his biological father having succumbed to the disease and his brother recently diagnosed.    Supplemental Information  He has been advised to undergo a left knee replacement but has deferred the procedure due to time constraints.    SOCIAL HISTORY  He is working with the [employer] project.    FAMILY HISTORY  His biological father  of colon cancer. His brother was diagnosed with colon cancer about 4 weeks ago.    MEDICATIONS  Current: Mounjaro      Patient Active Problem List    Diagnosis Date Noted    Other insomnia 2024    Type 2 diabetes mellitus with hyperglycemia, without long-term current use of insulin 2022    Cervical radiculopathy 2020    Lumbar degenerative disc disease 2020    Chronic midline low back pain with bilateral sciatica 2020    Reactive airway disease with acute exacerbation 2019     Hyperlipidemia 07/11/2019    Class 2 severe obesity due to excess calories with serious comorbidity and body mass index (BMI) of 39.0 to 39.9 in adult 07/11/2019    Dyspnea 07/02/2019    GUILLERMINA (obstructive sleep apnea) 07/02/2019    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    Hypogonadism 06/13/2018    Other problems related to lifestyle 04/24/2018    Regular astigmatism 06/01/2017    Presbyopia 06/01/2017    Myopia 06/01/2017    Chronic coronary artery disease 09/19/2012    Cardiac disease 09/19/2012    Cyst of meniscus of knee 06/22/2012    Resting tremor 03/05/2012    Degenerative joint disease 02/08/2012    Hypertension 01/18/2012    Anxiety disorder 01/18/2012    Obsessive-compulsive disorder 01/18/2012    Memory loss 01/16/2012           Past Surgical History:   Procedure Laterality Date    ABLATION OF DYSRHYTHMIC FOCUS  2021 back    CARDIAC CATHETERIZATION  04/2018    KNEE ARTHROSCOPY Right 07/09/2012     Current Outpatient Medications on File Prior to Visit   Medication Sig    albuterol (PROVENTIL) (2.5 MG/3ML) 0.083% nebulizer solution USE 1 VIAL IN NEBULIZER EVERY 4 HOURS AS NEEDED FOR WHEEZING    albuterol sulfate  (90 Base) MCG/ACT inhaler Inhale 2 puffs Every 4 (Four) Hours As Needed for Wheezing.    amLODIPine (NORVASC) 5 MG tablet Take 1 tablet by mouth Daily.    Blood Glucose Monitoring Suppl kit Use as directed to check blood glucose    celecoxib (CeleBREX) 200 MG capsule Take 1 capsule by mouth once daily    cetirizine (ZyrTEC Allergy) 10 MG tablet Take 1 tablet by mouth Daily.    clobetasol (TEMOVATE) 0.05 % external solution Apply  topically to the appropriate area as directed 2 (Two) Times a Day.    DULoxetine (CYMBALTA) 60 MG capsule Take 1 capsule by mouth once daily    EQ Aspirin Adult Low Dose 81 MG EC tablet Take 1 tablet by mouth once daily    escitalopram (Lexapro) 5 MG tablet Take 1 tablet by mouth Every Morning.    fluticasone (FLONASE) 50 MCG/ACT nasal spray USE 2 SPRAY(S) IN EACH  NOSTRIL ONCE DAILY AS DIRECTED    glimepiride (AMARYL) 1 MG tablet TAKE 1 TABLET BY MOUTH ONCE DAILY IN THE MORNING BEFORE BREAKFAST    glucose blood test strip Use as instructed to check blood glucose once daily    Lancets misc Use once daily with meter and strips to check blood glucose    melatonin 5 MG tablet tablet Take 1 tablet by mouth At Night As Needed (insomnia).    metoprolol succinate XL (TOPROL-XL) 25 MG 24 hr tablet Take 0.5 tablets by mouth Daily.    Mounjaro 15 MG/0.5ML solution auto-injector Inject 15 mg under the skin into the appropriate area as directed 1 (One) Time Per Week.    naproxen sodium (ALEVE) 220 MG tablet Take 1 tablet by mouth 2 (Two) Times a Day As Needed.    nitroglycerin (Nitrostat) 0.4 MG SL tablet Place 1 tablet under the tongue Every 5 (Five) Minutes As Needed for Chest Pain. Take no more than 3 doses in 15 minutes.    rosuvastatin (CRESTOR) 20 MG tablet Take 1 tablet by mouth every night at bedtime.    traZODone (DESYREL) 50 MG tablet TAKE 1 TO 2 TABLETS BY MOUTH EVERY DAY AT BEDTIME AS NEEDED    [DISCONTINUED] Tirzepatide (MOUNJARO) 12.5 MG/0.5ML solution pen-injector pen Inject 0.5 mL under the skin into the appropriate area as directed 1 (One) Time Per Week.     No current facility-administered medications on file prior to visit.     Allergies   Allergen Reactions    Niacin Rash     Social History     Socioeconomic History    Marital status: Single    Number of children: 2   Tobacco Use    Smoking status: Former     Current packs/day: 0.00     Average packs/day: 0.3 packs/day for 3.0 years (0.8 ttl pk-yrs)     Types: Cigarettes     Start date: 1988     Quit date: 1991     Years since quittin.1     Passive exposure: Past    Smokeless tobacco: Never    Tobacco comments:     barely   Vaping Use    Vaping status: Never Used   Substance and Sexual Activity    Alcohol use: No    Drug use: No    Sexual activity: Yes     Partners: Female     Birth control/protection:  "Condom     Family History   Problem Relation Age of Onset    Hearing loss Mother     Colon cancer Father     No Known Problems Sister     Asthma Brother         childhood    Colon cancer Brother     Asthma Daughter     No Known Problems Maternal Aunt     No Known Problems Maternal Uncle     No Known Problems Paternal Aunt     No Known Problems Paternal Uncle     Arthritis Maternal Grandmother     Heart disease Maternal Grandmother     Arthritis Maternal Grandfather     Diabetes Maternal Grandfather     Stroke Maternal Grandfather     Heart attack Maternal Grandfather     No Known Problems Paternal Grandmother     No Known Problems Paternal Grandfather     Heart disease Other     Hypertension Other     Stroke Other     Prostate cancer Other     Anemia Neg Hx     Arrhythmia Neg Hx     Clotting disorder Neg Hx     Fainting Neg Hx     Heart failure Neg Hx     Hyperlipidemia Neg Hx        Review of Systems    Objective   /83 (BP Location: Left arm, Patient Position: Sitting, Cuff Size: Large Adult)   Pulse 83   Temp 97.8 °F (36.6 °C) (Infrared)   Resp 18   Ht 185.4 cm (73\")   Wt 119 kg (261 lb 9.6 oz)   SpO2 95%   BMI 34.51 kg/m²   Physical Exam  Constitutional:       Appearance: He is well-developed.      Comments:      HENT:      Head: Normocephalic and atraumatic.   Eyes:      Conjunctiva/sclera: Conjunctivae normal.   Cardiovascular:      Rate and Rhythm: Normal rate.   Pulmonary:      Effort: Pulmonary effort is normal.   Musculoskeletal:         General: Normal range of motion.      Cervical back: Normal range of motion.   Skin:     General: Skin is warm and dry.      Findings: No rash.   Neurological:      Mental Status: He is alert and oriented to person, place, and time.   Psychiatric:         Behavior: Behavior normal.       Physical Exam  Vital Signs  Height is 6 feet 1 inch. Weight is 260 pounds.      No visits with results within 4 Month(s) from this visit.   Latest known visit with results is: "   Office Visit on 10/09/2024   Component Date Value Ref Range Status    Hemoglobin A1C 10/09/2024 5.6  4.8 - 5.6 % Final    Comment:          Prediabetes: 5.7 - 6.4           Diabetes: >6.4           Glycemic control for adults with diabetes: <7.0      Testosterone, Total 10/09/2024 208 (L)  264 - 916 ng/dL Final    Comment: Adult male reference interval is based on a population of  healthy nonobese males (BMI <30) between 19 and 39 years old.  taylor [email or web address]. JCEM 2017,102;1180-4591. PMID: [id number].      Glucose 10/09/2024 82  70 - 99 mg/dL Final    BUN 10/09/2024 15  6 - 24 mg/dL Final    Creatinine 10/09/2024 0.91  0.76 - 1.27 mg/dL Final    EGFR Result 10/09/2024 100  >59 mL/min/1.73 Final    BUN/Creatinine Ratio 10/09/2024 16  9 - 20 Final    Sodium 10/09/2024 140  134 - 144 mmol/L Final    Potassium 10/09/2024 4.5  3.5 - 5.2 mmol/L Final    Chloride 10/09/2024 103  96 - 106 mmol/L Final    Total CO2 10/09/2024 23  20 - 29 mmol/L Final    Calcium 10/09/2024 9.5  8.7 - 10.2 mg/dL Final    Total Protein 10/09/2024 6.7  6.0 - 8.5 g/dL Final    Albumin 10/09/2024 4.5  3.8 - 4.9 g/dL Final    Globulin 10/09/2024 2.2  1.5 - 4.5 g/dL Final    Total Bilirubin 10/09/2024 1.0  0.0 - 1.2 mg/dL Final    Alkaline Phosphatase 10/09/2024 53  44 - 121 IU/L Final    AST (SGOT) 10/09/2024 26  0 - 40 IU/L Final    ALT (SGPT) 10/09/2024 23  0 - 44 IU/L Final     Results  Laboratory Studies  A1c was normal last time.          Assessment & Plan   Diagnoses and all orders for this visit:    1. Type 2 diabetes mellitus with hyperglycemia, without long-term current use of insulin (Primary)  -     Hemoglobin A1c  -     Comprehensive Metabolic Panel    2. Primary hypertension  -     CBC & Differential    3. Mixed hyperlipidemia    4. Chronic midline low back pain with bilateral sciatica    5. Generalized anxiety disorder    6. Other insomnia    7. Subacute maxillary sinusitis  -     POCT SARS-CoV-2 Antigen YUMIKO + Flu  -      amoxicillin-clavulanate (AUGMENTIN) 875-125 MG per tablet; Take 1 tablet by mouth 2 (Two) Times a Day for 10 days.  Dispense: 20 tablet; Refill: 0  -     Ambulatory Referral to ENT (Otolaryngology)    8. Family history of colon cancer  -     Ambulatory Referral to Colorectal Surgery    9. Colon cancer screening  -     Ambulatory Referral to Colorectal Surgery      Assessment & Plan  1. Sinusitis.  The patient's symptoms, including nasal bleeding, runny nose, eye pain, and facial swelling, are consistent with sinusitis likely due to the inhalation of silicone glass beads. An antibiotic regimen will be initiated to counteract potential infection. A referral to an ENT specialist will be made for further evaluation and possible endoscopic examination. He is advised to continue using the Navage nasal irrigation system.    2. Weight management.  The patient has lost 30 pounds since the last visit and is currently on a 15 mg dose of Mounjaro. He is advised to continue his current medication regimen and lifestyle changes, including a diet of unprocessed foods and regular walking. He will inform the clinic when his medication supply is low to ensure continuity.  Will check an A1c today and follow-up with him when the results are back.    Continue Cymbalta at current dose as it is helping his back, mood.    Blood pressure is excellent at 115/83.  Continue amlodipine 5 mg/day and Toprol-XL 25 mg/day for now.      3. Back pain.  The patient reports returning pain in the area where he previously had an ablation, describing it as muscular or nerve-related. He is advised to monitor the pain and contact the clinic if it worsens or returns to its previous intensity.    4. Preventative care.  A referral for a colonoscopy will be made, given the patient's family history of colon cancer. An A1c test will be conducted today to monitor his blood sugar levels.      Follow-up  The patient will follow up in 3 months.    PROCEDURE  The  patient previously underwent an ablation procedure for back pain.        Call with any problems or concerns before next visit       Return in about 3 months (around 5/12/2025).  Patient or patient representative verbalized consent for the use of Ambient Listening during the visit with  Traci Bass MD for chart documentation. 2/12/2025  17:06 EST    Part of this note may be an electronic transcription/translation of spoken language to printed text using the Dragon Dictation System    Traci Bass MD2/12/202517:06 EST  This note has been electronically signed

## 2025-02-13 DIAGNOSIS — E78.2 MIXED HYPERLIPIDEMIA: ICD-10-CM

## 2025-02-13 LAB
ALBUMIN SERPL-MCNC: 4.8 G/DL (ref 3.8–4.9)
ALP SERPL-CCNC: 62 IU/L (ref 44–121)
ALT SERPL-CCNC: 22 IU/L (ref 0–44)
AST SERPL-CCNC: 26 IU/L (ref 0–40)
BASOPHILS # BLD AUTO: 0 X10E3/UL (ref 0–0.2)
BASOPHILS NFR BLD AUTO: 0 %
BILIRUB SERPL-MCNC: 1.1 MG/DL (ref 0–1.2)
BUN SERPL-MCNC: 15 MG/DL (ref 6–24)
BUN/CREAT SERPL: 16 (ref 9–20)
CALCIUM SERPL-MCNC: 9.9 MG/DL (ref 8.7–10.2)
CHLORIDE SERPL-SCNC: 100 MMOL/L (ref 96–106)
CO2 SERPL-SCNC: 26 MMOL/L (ref 20–29)
CREAT SERPL-MCNC: 0.96 MG/DL (ref 0.76–1.27)
EGFRCR SERPLBLD CKD-EPI 2021: 93 ML/MIN/1.73
EOSINOPHIL # BLD AUTO: 0.1 X10E3/UL (ref 0–0.4)
EOSINOPHIL NFR BLD AUTO: 1 %
ERYTHROCYTE [DISTWIDTH] IN BLOOD BY AUTOMATED COUNT: 12.6 % (ref 11.6–15.4)
GLOBULIN SER CALC-MCNC: 2.3 G/DL (ref 1.5–4.5)
GLUCOSE SERPL-MCNC: 83 MG/DL (ref 70–99)
HBA1C MFR BLD: 5.3 % (ref 4.8–5.6)
HCT VFR BLD AUTO: 51.8 % (ref 37.5–51)
HGB BLD-MCNC: 17.6 G/DL (ref 13–17.7)
IMM GRANULOCYTES # BLD AUTO: 0 X10E3/UL (ref 0–0.1)
IMM GRANULOCYTES NFR BLD AUTO: 0 %
LYMPHOCYTES # BLD AUTO: 2.2 X10E3/UL (ref 0.7–3.1)
LYMPHOCYTES NFR BLD AUTO: 25 %
MCH RBC QN AUTO: 31.7 PG (ref 26.6–33)
MCHC RBC AUTO-ENTMCNC: 34 G/DL (ref 31.5–35.7)
MCV RBC AUTO: 93 FL (ref 79–97)
MONOCYTES # BLD AUTO: 0.7 X10E3/UL (ref 0.1–0.9)
MONOCYTES NFR BLD AUTO: 8 %
NEUTROPHILS # BLD AUTO: 5.8 X10E3/UL (ref 1.4–7)
NEUTROPHILS NFR BLD AUTO: 66 %
PLATELET # BLD AUTO: 172 X10E3/UL (ref 150–450)
POTASSIUM SERPL-SCNC: 5.1 MMOL/L (ref 3.5–5.2)
PROT SERPL-MCNC: 7.1 G/DL (ref 6–8.5)
RBC # BLD AUTO: 5.56 X10E6/UL (ref 4.14–5.8)
SODIUM SERPL-SCNC: 140 MMOL/L (ref 134–144)
WBC # BLD AUTO: 8.8 X10E3/UL (ref 3.4–10.8)

## 2025-02-13 RX ORDER — ROSUVASTATIN CALCIUM 20 MG/1
20 TABLET, COATED ORAL
Qty: 90 TABLET | Refills: 3 | Status: SHIPPED | OUTPATIENT
Start: 2025-02-13

## 2025-02-28 RX ORDER — TIRZEPATIDE 15 MG/.5ML
INJECTION, SOLUTION SUBCUTANEOUS
Qty: 6 ML | Refills: 3 | Status: SHIPPED | OUTPATIENT
Start: 2025-02-28

## 2025-04-06 DIAGNOSIS — J45.41 MODERATE PERSISTENT REACTIVE AIRWAY DISEASE WITH ACUTE EXACERBATION: ICD-10-CM

## 2025-04-06 DIAGNOSIS — R06.02 SHORTNESS OF BREATH: ICD-10-CM

## 2025-04-06 DIAGNOSIS — G47.09 OTHER INSOMNIA: ICD-10-CM

## 2025-04-07 RX ORDER — ALBUTEROL SULFATE 90 UG/1
2 INHALANT RESPIRATORY (INHALATION) EVERY 4 HOURS PRN
Qty: 18 G | Refills: 2 | Status: SHIPPED | OUTPATIENT
Start: 2025-04-07

## 2025-04-07 RX ORDER — TRAZODONE HYDROCHLORIDE 50 MG/1
TABLET ORAL
Qty: 60 TABLET | Refills: 2 | Status: SHIPPED | OUTPATIENT
Start: 2025-04-07

## 2025-05-07 RX ORDER — METOPROLOL SUCCINATE 25 MG/1
12.5 TABLET, EXTENDED RELEASE ORAL DAILY
Qty: 45 TABLET | Refills: 3 | Status: SHIPPED | OUTPATIENT
Start: 2025-05-07

## 2025-05-07 RX ORDER — AMLODIPINE BESYLATE 5 MG/1
5 TABLET ORAL DAILY
Qty: 90 TABLET | Refills: 3 | Status: SHIPPED | OUTPATIENT
Start: 2025-05-07

## 2025-05-07 NOTE — TELEPHONE ENCOUNTER
Caller: YobanyAng lópez    Relationship: Self    Best call back number: [phone]     Requested Prescriptions:   Requested Prescriptions     Pending Prescriptions Disp Refills    amLODIPine (NORVASC) 5 MG tablet 90 tablet 3     Sig: Take 1 tablet by mouth Daily.    metoprolol succinate XL (TOPROL-XL) 25 MG 24 hr tablet 90 tablet 3     Sig: Take 0.5 tablets by mouth Daily.        Pharmacy where request should be sent: [address] [facility] [address]  - [phone]  - [phone] FX     Last office visit with prescribing clinician: 2/12/2025   Last telemedicine visit with prescribing clinician: Visit date not found   Next office visit with prescribing clinician: 5/13/2025     Additional details provided by patient: PATIENT IS OUT AND BEEN OUT FOR ABOUT A MONTH    Does the patient have less than a 3 day supply:  [x] Yes  [] No      Blayne Tobin Rep   05/07/25 14:12 EDT

## 2025-06-12 ENCOUNTER — PREP FOR SURGERY (OUTPATIENT)
Dept: OTHER | Facility: HOSPITAL | Age: 55
End: 2025-06-12
Payer: COMMERCIAL

## 2025-06-12 DIAGNOSIS — Z12.11 ENCOUNTER FOR SCREENING COLONOSCOPY: Primary | ICD-10-CM

## 2025-06-12 RX ORDER — SODIUM CHLORIDE 9 MG/ML
30 INJECTION, SOLUTION INTRAVENOUS CONTINUOUS PRN
OUTPATIENT
Start: 2025-06-12 | End: 2025-06-13

## 2025-06-30 RX ORDER — TIRZEPATIDE 15 MG/.5ML
INJECTION, SOLUTION SUBCUTANEOUS
Qty: 6 ML | Refills: 3 | Status: SHIPPED | OUTPATIENT
Start: 2025-06-30

## 2025-07-02 DIAGNOSIS — G47.09 OTHER INSOMNIA: ICD-10-CM

## 2025-07-02 RX ORDER — TRAZODONE HYDROCHLORIDE 50 MG/1
TABLET ORAL
Qty: 60 TABLET | Refills: 2 | Status: SHIPPED | OUTPATIENT
Start: 2025-07-02

## 2025-07-21 ENCOUNTER — TELEPHONE (OUTPATIENT)
Dept: FAMILY MEDICINE CLINIC | Facility: CLINIC | Age: 55
End: 2025-07-21

## 2025-07-21 ENCOUNTER — OFFICE VISIT (OUTPATIENT)
Dept: FAMILY MEDICINE CLINIC | Facility: CLINIC | Age: 55
End: 2025-07-21
Payer: COMMERCIAL

## 2025-07-21 VITALS
TEMPERATURE: 97.8 F | BODY MASS INDEX: 33.93 KG/M2 | RESPIRATION RATE: 18 BRPM | WEIGHT: 256 LBS | HEIGHT: 73 IN | DIASTOLIC BLOOD PRESSURE: 72 MMHG | HEART RATE: 80 BPM | OXYGEN SATURATION: 94 % | SYSTOLIC BLOOD PRESSURE: 105 MMHG

## 2025-07-21 DIAGNOSIS — I25.10 CHRONIC CORONARY ARTERY DISEASE: ICD-10-CM

## 2025-07-21 DIAGNOSIS — M25.512 ACUTE PAIN OF LEFT SHOULDER: ICD-10-CM

## 2025-07-21 DIAGNOSIS — I10 PRIMARY HYPERTENSION: ICD-10-CM

## 2025-07-21 DIAGNOSIS — F41.1 GENERALIZED ANXIETY DISORDER: ICD-10-CM

## 2025-07-21 DIAGNOSIS — E78.2 MIXED HYPERLIPIDEMIA: ICD-10-CM

## 2025-07-21 DIAGNOSIS — L21.9 SEBORRHEA: ICD-10-CM

## 2025-07-21 DIAGNOSIS — G47.09 OTHER INSOMNIA: ICD-10-CM

## 2025-07-21 DIAGNOSIS — E11.65 TYPE 2 DIABETES MELLITUS WITH HYPERGLYCEMIA, WITHOUT LONG-TERM CURRENT USE OF INSULIN: Primary | ICD-10-CM

## 2025-07-21 PROCEDURE — 99214 OFFICE O/P EST MOD 30 MIN: CPT | Performed by: FAMILY MEDICINE

## 2025-07-21 RX ORDER — NEOMYCIN SULFATE, POLYMYXIN B SULFATE AND HYDROCORTISONE 3.5; 10000; 1 MG/ML; [IU]/ML; MG/ML
3 SOLUTION AURICULAR (OTIC) 4 TIMES DAILY
Qty: 10 ML | Refills: 1 | Status: SHIPPED | OUTPATIENT
Start: 2025-07-21

## 2025-07-21 NOTE — PROGRESS NOTES
Subjective   Ang Yoon is a 55 y.o. male.   Chief Complaint   Patient presents with    Diabetes    Hypertension    Hyperlipidemia    Anxiety    Insomnia    Ear Fullness    Shoulder Injury       Diabetes  Hypertension  Associated symptoms: anxiety    Hyperlipidemia    Anxiety    Symptoms: insomnia    Insomnia  Ear Fullness  Shoulder Injury        55 y.o. male with past medical history of type 2 diabetes, high cholesterol, hypertension, some mild chronic anxiety and insomnia presents to the office today to follow-up.    He has been on Mounjaro 50 mg/week for his diabetes.  His weight got down to 230 pounds, but he has been working out with weights and is up to 256 pounds.    Blood pressure today is excellent at 105/72.  Last A1c was February of this year and it was excellent at 5.3%.  Last lipid panel was just over a year ago and it was also excellent.    Additional complaints include pain in the left shoulder following a fall in April and feeling like something is in his ear.      History of Present Illness  The patient presents for evaluation of bilateral ear discomfort, left shoulder pain, and diabetes.    He reports a constant sensation of something in his ears, with the left ear being more bothersome than the right. He frequently rubs his ears and occasionally wakes up during the night due to discomfort, requiring him to rub his ear. He has been using pure safflower oil in his ears, which provides temporary relief.    He experienced a fall while moving a heavy chest of drawers, resulting in him landing hard against a wall with the weight of the dresser on his left shoulder. Since then, he has been experiencing constant aching and burning sensations in his shoulder, particularly when performing certain movements such as pushing laundry into a front . He also reports a significant decrease in strength in his hand. Sleeping on his right side causes severe pain in his left shoulder, while sleeping on  his left side alleviates the pain. He describes his shoulder as feeling weak and stiff, and any quick movements exacerbate the pain. He recalls hearing a pop during the incident, but is unsure if it was natural or injury-related. His shoulder now cracks and pops, but without significant pain. He has a good range of motion, but the burning sensation at night is unbearable. He does not experience any tactile pain. He has tried taking gabapentin to help him sleep through the night, but it did not provide any relief. An x-ray of his shoulder taken years ago revealed splintering or spurs, and he was referred to a specialist at that time.    He has been modifying his diet and has quit eating processed foods over a year ago. He is still struggling with cravings when he cuts back on the Mounjaro or extends them. He tried to get off the duloxetine on accident as he forgot and ran out of it, and then he had massive withdrawal symptoms. His weight was down to 236 pounds according to his home scales, but after he moved and started cooking better, he is up to 250 pounds now and it has been staying right about there. He noticed he was having weakness in the areas he used to not have weakness. He gets lightheaded on rare occasions when he is standing and cooking for a while or turns real fast.    Occupations: Works in rural expansion for an [employer]  Hobbies: Mickey chi with weights  Diet: Rice and vegetables, no processed foods for over a year  Sleep: Wakes up due to ear discomfort, otherwise  Living Condition: Lives in a house surrounded by trees, quiet environment      Patient Active Problem List    Diagnosis Date Noted    Encounter for screening colonoscopy 06/12/2025    Other insomnia 01/28/2024    Type 2 diabetes mellitus with hyperglycemia, without long-term current use of insulin 09/17/2022    Cervical radiculopathy 08/17/2020    Lumbar degenerative disc disease 04/29/2020    Chronic midline low back pain with  bilateral sciatica 04/29/2020    Reactive airway disease with acute exacerbation 11/01/2019    Hyperlipidemia 07/11/2019    Class 2 severe obesity due to excess calories with serious comorbidity and body mass index (BMI) of 39.0 to 39.9 in adult 07/11/2019    Dyspnea 07/02/2019    GUILLERMINA (obstructive sleep apnea) 07/02/2019    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    Hypogonadism 06/13/2018    Other problems related to lifestyle 04/24/2018    Regular astigmatism 06/01/2017    Presbyopia 06/01/2017    Myopia 06/01/2017    Chronic coronary artery disease 09/19/2012    Cardiac disease 09/19/2012    Cyst of meniscus of knee 06/22/2012    Resting tremor 03/05/2012    Degenerative joint disease 02/08/2012    Hypertension 01/18/2012    Anxiety disorder 01/18/2012    Obsessive-compulsive disorder 01/18/2012    Memory loss 01/16/2012           Past Surgical History:   Procedure Laterality Date    ABLATION OF DYSRHYTHMIC FOCUS  2021 back    CARDIAC CATHETERIZATION  04/2018    KNEE ARTHROSCOPY Right 07/09/2012     Current Outpatient Medications on File Prior to Visit   Medication Sig    albuterol (PROVENTIL) (2.5 MG/3ML) 0.083% nebulizer solution USE 1 VIAL IN NEBULIZER EVERY 4 HOURS AS NEEDED FOR WHEEZING    albuterol sulfate  (90 Base) MCG/ACT inhaler INHALE 2 PUFFS BY MOUTH EVERY 4 HOURS AS NEEDED FOR WHEEZING    amLODIPine (NORVASC) 5 MG tablet Take 1 tablet by mouth Daily.    celecoxib (CeleBREX) 200 MG capsule Take 1 capsule by mouth once daily    cetirizine (ZyrTEC Allergy) 10 MG tablet Take 1 tablet by mouth Daily.    clobetasol (TEMOVATE) 0.05 % external solution Apply  topically to the appropriate area as directed 2 (Two) Times a Day.    DULoxetine (CYMBALTA) 60 MG capsule Take 1 capsule by mouth once daily    EQ Aspirin Adult Low Dose 81 MG EC tablet Take 1 tablet by mouth once daily    escitalopram (Lexapro) 5 MG tablet Take 1 tablet by mouth Every Morning.    fluticasone (FLONASE) 50 MCG/ACT nasal spray USE 2  SPRAY(S) IN EACH NOSTRIL ONCE DAILY AS DIRECTED    glimepiride (AMARYL) 1 MG tablet TAKE 1 TABLET BY MOUTH ONCE DAILY IN THE MORNING BEFORE BREAKFAST    metoprolol succinate XL (TOPROL-XL) 25 MG 24 hr tablet Take 0.5 tablets by mouth Daily.    naproxen sodium (ALEVE) 220 MG tablet Take 1 tablet by mouth 2 (Two) Times a Day As Needed.    rosuvastatin (CRESTOR) 20 MG tablet TAKE 1 TABLET BY MOUTH EVERY DAY AT BEDTIME    Tirzepatide (Mounjaro) 15 MG/0.5ML solution auto-injector INJECT 15 MG SUBCUTANEOUSLY ONCE A WEEK    traZODone (DESYREL) 50 MG tablet TAKE 1 TO 2 TABLETS BY MOUTH EVERY DAY AT BEDTIME AS NEEDED    Blood Glucose Monitoring Suppl kit Use as directed to check blood glucose (Patient not taking: Reported on 7/21/2025)    glucose blood test strip Use as instructed to check blood glucose once daily (Patient not taking: Reported on 7/21/2025)    Lancets misc Use once daily with meter and strips to check blood glucose (Patient not taking: Reported on 7/21/2025)    nitroglycerin (Nitrostat) 0.4 MG SL tablet Place 1 tablet under the tongue Every 5 (Five) Minutes As Needed for Chest Pain. Take no more than 3 doses in 15 minutes.    polyethylene glycol (GoLYTELY) 236 g solution Mix the Golytely solution and put it in the fridge a few hours before drinking. Cold is better! 5:00 PM the day before your colonoscopy It's time for your 1st dose of bowel prep. Drink half of the bottle within 1 hour. Sip each glass quickly, and using a straw might make it easier. Put the rest in the fridge for later. 5 AM the morning of your colonoscopy, take your 2nd dose of bowel prep.    [DISCONTINUED] melatonin 5 MG tablet tablet Take 1 tablet by mouth At Night As Needed (insomnia).     No current facility-administered medications on file prior to visit.     Allergies   Allergen Reactions    Niacin Rash     Social History     Socioeconomic History    Marital status: Single    Number of children: 2   Tobacco Use    Smoking status:  "Former     Current packs/day: 0.00     Average packs/day: 0.3 packs/day for 3.0 years (0.8 ttl pk-yrs)     Types: Cigarettes     Start date: 1988     Quit date: 1991     Years since quittin.5     Passive exposure: Past    Smokeless tobacco: Never    Tobacco comments:     barely   Vaping Use    Vaping status: Never Used   Substance and Sexual Activity    Alcohol use: No    Drug use: No    Sexual activity: Yes     Partners: Female     Birth control/protection: Condom     Family History   Problem Relation Age of Onset    Hearing loss Mother     Colon cancer Father     No Known Problems Sister     Asthma Brother         childhood    Colon cancer Brother     Asthma Daughter     No Known Problems Maternal Aunt     No Known Problems Maternal Uncle     No Known Problems Paternal Aunt     No Known Problems Paternal Uncle     Arthritis Maternal Grandmother     Heart disease Maternal Grandmother     Arthritis Maternal Grandfather     Diabetes Maternal Grandfather     Stroke Maternal Grandfather     Heart attack Maternal Grandfather     No Known Problems Paternal Grandmother     No Known Problems Paternal Grandfather     Heart disease Other     Hypertension Other     Stroke Other     Prostate cancer Other     Anemia Neg Hx     Arrhythmia Neg Hx     Clotting disorder Neg Hx     Fainting Neg Hx     Heart failure Neg Hx     Hyperlipidemia Neg Hx        Review of Systems   Psychiatric/Behavioral:  The patient has insomnia.        Objective   /72 (BP Location: Left arm, Patient Position: Sitting, Cuff Size: Large Adult)   Pulse 80   Temp 97.8 °F (36.6 °C) (Infrared)   Resp 18   Ht 185.4 cm (73\")   Wt 116 kg (256 lb)   SpO2 94%   BMI 33.78 kg/m²   Physical Exam  Constitutional:       General: He is not in acute distress.     Appearance: He is well-developed. He is not toxic-appearing.      Comments:      HENT:      Head: Normocephalic and atraumatic.      Ears:      Comments: He has some inflammation of both " ear canals with flaky skin consistent with seborrhea  Eyes:      Conjunctiva/sclera: Conjunctivae normal.   Cardiovascular:      Rate and Rhythm: Normal rate.   Pulmonary:      Effort: Pulmonary effort is normal. No respiratory distress.   Musculoskeletal:         General: Normal range of motion.      Cervical back: Normal range of motion.      Comments: Positive impingement sign on the left.   Skin:     General: Skin is warm and dry.      Findings: No rash.   Neurological:      Mental Status: He is alert and oriented to person, place, and time.      Gait: Gait normal.   Psychiatric:         Behavior: Behavior normal.       Physical Exam  Ears: Bilateral ear canals appear slightly irritated, with the left being more so than the right.  Musculoskeletal: Mild swelling in the left shoulder joint.  Other: Blood pressure reading is 105/72.      No visits with results within 4 Month(s) from this visit.   Latest known visit with results is:   Office Visit on 02/12/2025   Component Date Value Ref Range Status    SARS Antigen 02/12/2025 Not Detected  Not Detected, Presumptive Negative Final    Influenza A Antigen YUMIKO 02/12/2025 Not Detected  Not Detected Final    Influenza B Antigen YUMIKO 02/12/2025 Not Detected  Not Detected Final    Internal Control 02/12/2025 Passed  Passed Final    Lot Number 02/12/2025 [id number]   Final    Expiration Date 02/12/2025 11/30/2025   Final    Hemoglobin A1C 02/12/2025 5.3  4.8 - 5.6 % Final    Comment:          Prediabetes: 5.7 - 6.4           Diabetes: >6.4           Glycemic control for adults with diabetes: <7.0      Glucose 02/12/2025 83  70 - 99 mg/dL Final    BUN 02/12/2025 15  6 - 24 mg/dL Final    Creatinine 02/12/2025 0.96  0.76 - 1.27 mg/dL Final    EGFR Result 02/12/2025 93  >59 mL/min/1.73 Final    BUN/Creatinine Ratio 02/12/2025 16  9 - 20 Final    Sodium 02/12/2025 140  134 - 144 mmol/L Final    Potassium 02/12/2025 5.1  3.5 - 5.2 mmol/L Final    Chloride 02/12/2025 100  96 -  106 mmol/L Final    Total CO2 02/12/2025 26  20 - 29 mmol/L Final    Calcium 02/12/2025 9.9  8.7 - 10.2 mg/dL Final    Total Protein 02/12/2025 7.1  6.0 - 8.5 g/dL Final    Albumin 02/12/2025 4.8  3.8 - 4.9 g/dL Final    Globulin 02/12/2025 2.3  1.5 - 4.5 g/dL Final    Total Bilirubin 02/12/2025 1.1  0.0 - 1.2 mg/dL Final    Alkaline Phosphatase 02/12/2025 62  44 - 121 IU/L Final    AST (SGOT) 02/12/2025 26  0 - 40 IU/L Final    ALT (SGPT) 02/12/2025 22  0 - 44 IU/L Final    WBC 02/12/2025 8.8  3.4 - 10.8 x10E3/uL Final    RBC 02/12/2025 5.56  4.14 - 5.80 x10E6/uL Final    Hemoglobin 02/12/2025 17.6  13.0 - 17.7 g/dL Final    Hematocrit 02/12/2025 51.8 (H)  37.5 - 51.0 % Final    MCV 02/12/2025 93  79 - 97 fL Final    MCH 02/12/2025 31.7  26.6 - 33.0 pg Final    MCHC 02/12/2025 34.0  31.5 - 35.7 g/dL Final    RDW 02/12/2025 12.6  11.6 - 15.4 % Final    Platelets 02/12/2025 172  150 - 450 x10E3/uL Final    Neutrophil Rel % 02/12/2025 66  Not Estab. % Final    Lymphocyte Rel % 02/12/2025 25  Not Estab. % Final    Monocyte Rel % 02/12/2025 8  Not Estab. % Final    Eosinophil Rel % 02/12/2025 1  Not Estab. % Final    Basophil Rel % 02/12/2025 0  Not Estab. % Final    Neutrophils Absolute 02/12/2025 5.8  1.4 - 7.0 x10E3/uL Final    Lymphocytes Absolute 02/12/2025 2.2  0.7 - 3.1 x10E3/uL Final    Monocytes Absolute 02/12/2025 0.7  0.1 - 0.9 x10E3/uL Final    Eosinophils Absolute 02/12/2025 0.1  0.0 - 0.4 x10E3/uL Final    Basophils Absolute 02/12/2025 0.0  0.0 - 0.2 x10E3/uL Final    Immature Granulocyte Rel % 02/12/2025 0  Not Estab. % Final    Immature Grans Absolute 02/12/2025 0.0  0.0 - 0.1 x10E3/uL Final     Results            Assessment & Plan   Diagnoses and all orders for this visit:    1. Type 2 diabetes mellitus with hyperglycemia, without long-term current use of insulin (Primary)  -     Microalbumin / Creatinine Urine Ratio - Urine, Clean Catch  -     Hemoglobin A1c  -     Comprehensive Metabolic Panel  -      TSH    2. Primary hypertension    3. Mixed hyperlipidemia  -     Lipid Panel    4. Chronic coronary artery disease  -     Lipid Panel    5. Generalized anxiety disorder    6. Other insomnia    7. Seborrhea  -     neomycin-polymyxin-hydrocortisone (CORTISPORIN) 3.5-14409-5 otic solution; Administer 3 drops into both ears 4 (Four) Times a Day.  Dispense: 10 mL; Refill: 1    8. Acute pain of left shoulder  -     XR Shoulder 2+ View Left; Future      Assessment & Plan  1. Bilateral otic psoriasis.  - Symptoms suggest a possible case of otic psoriasis or seborrhea, which are skin conditions affecting the ear canal.  - Ear canals appear red and irritated upon examination.  - Discussed the use of ear drops four times daily until symptoms subside, then discontinue until symptoms reappear.  - Prescription for ear drops provided.    2. Left shoulder pain.  - Mechanism of injury suggests a potential strain or tear to the rotator cuff.  - Physical examination reveals limited range of motion and swelling in the shoulder joint.  - Ordered an x-ray of the left shoulder to further investigate the cause of pain; MRI may be considered based on x-ray results.  - Discussed the possibility of exercises to improve range of motion if rotator cuff not torn.    3. Diabetes.  - Diabetes is currently well-managed with medication.  - Blood work ordered today to monitor blood sugar levels.  - Discussed the importance of diet and exercise in maintaining diabetes control.    4. Blood pressure management.  - Blood pressure readings are within normal range at 105/72 mmHg.  - No changes to the current medication regimen are necessary at this time.  - Advised to monitor for symptoms such as dizziness or lightheadedness upon standing; if blood pressure drops below 100 mmHg, a reduction in the dosage of amlodipine or metoprolol may be considered.        Call with any problems or concerns before next visit       Return in about 3 months (around  10/21/2025).  Patient or patient representative verbalized consent for the use of Ambient Listening during the visit with  Traci Bass MD for chart documentation. 7/21/2025  14:33 EDT    Part of this note may be an electronic transcription/translation of spoken language to printed text using the Dragon Dictation System    Traci Bass MD7/21/202514:33 EDT  This note has been electronically signed

## 2025-07-22 LAB
ALBUMIN SERPL-MCNC: 4.6 G/DL (ref 3.8–4.9)
ALBUMIN/CREAT UR: 13 MG/G CREAT (ref 0–29)
ALP SERPL-CCNC: 54 IU/L (ref 44–121)
ALT SERPL-CCNC: 18 IU/L (ref 0–44)
AST SERPL-CCNC: 26 IU/L (ref 0–40)
BILIRUB SERPL-MCNC: 0.7 MG/DL (ref 0–1.2)
BUN SERPL-MCNC: 14 MG/DL (ref 6–24)
BUN/CREAT SERPL: 14 (ref 9–20)
CALCIUM SERPL-MCNC: 9.7 MG/DL (ref 8.7–10.2)
CHLORIDE SERPL-SCNC: 104 MMOL/L (ref 96–106)
CHOLEST SERPL-MCNC: 102 MG/DL (ref 100–199)
CO2 SERPL-SCNC: 23 MMOL/L (ref 20–29)
CREAT SERPL-MCNC: 0.97 MG/DL (ref 0.76–1.27)
CREAT UR-MCNC: 196.7 MG/DL
EGFRCR SERPLBLD CKD-EPI 2021: 92 ML/MIN/1.73
GLOBULIN SER CALC-MCNC: 2.5 G/DL (ref 1.5–4.5)
GLUCOSE SERPL-MCNC: 83 MG/DL (ref 70–99)
HBA1C MFR BLD: 5.4 % (ref 4.8–5.6)
HDLC SERPL-MCNC: 50 MG/DL
LDLC SERPL CALC-MCNC: 36 MG/DL (ref 0–99)
MICROALBUMIN UR-MCNC: 24.9 UG/ML
POTASSIUM SERPL-SCNC: 5.3 MMOL/L (ref 3.5–5.2)
PROT SERPL-MCNC: 7.1 G/DL (ref 6–8.5)
SODIUM SERPL-SCNC: 143 MMOL/L (ref 134–144)
TRIGL SERPL-MCNC: 80 MG/DL (ref 0–149)
TSH SERPL DL<=0.005 MIU/L-ACNC: 0.83 UIU/ML (ref 0.45–4.5)
VLDLC SERPL CALC-MCNC: 16 MG/DL (ref 5–40)

## 2025-07-23 ENCOUNTER — HOSPITAL ENCOUNTER (OUTPATIENT)
Dept: GENERAL RADIOLOGY | Facility: HOSPITAL | Age: 55
Discharge: HOME | End: 2025-07-23
Admitting: FAMILY MEDICINE
Payer: COMMERCIAL

## 2025-07-23 DIAGNOSIS — M25.512 ACUTE PAIN OF LEFT SHOULDER: ICD-10-CM

## 2025-07-23 PROCEDURE — 73030 X-RAY EXAM OF SHOULDER: CPT

## 2025-07-28 DIAGNOSIS — M25.512 ACUTE PAIN OF LEFT SHOULDER: Primary | ICD-10-CM

## 2025-08-13 DIAGNOSIS — M54.41 CHRONIC MIDLINE LOW BACK PAIN WITH BILATERAL SCIATICA: ICD-10-CM

## 2025-08-13 DIAGNOSIS — G89.29 CHRONIC MIDLINE LOW BACK PAIN WITH BILATERAL SCIATICA: ICD-10-CM

## 2025-08-13 DIAGNOSIS — M54.42 CHRONIC MIDLINE LOW BACK PAIN WITH BILATERAL SCIATICA: ICD-10-CM

## 2025-08-13 RX ORDER — DULOXETIN HYDROCHLORIDE 60 MG/1
60 CAPSULE, DELAYED RELEASE ORAL DAILY
Qty: 90 CAPSULE | Refills: 3 | Status: SHIPPED | OUTPATIENT
Start: 2025-08-13

## 2025-08-25 ENCOUNTER — OFFICE VISIT (OUTPATIENT)
Dept: ORTHOPEDIC SURGERY | Facility: CLINIC | Age: 55
End: 2025-08-25
Payer: COMMERCIAL

## 2025-08-25 VITALS — BODY MASS INDEX: 33.93 KG/M2 | HEART RATE: 87 BPM | WEIGHT: 256 LBS | HEIGHT: 73 IN | OXYGEN SATURATION: 97 %

## 2025-08-25 DIAGNOSIS — M75.92 LEFT SUPRASPINATUS TENDINITIS: ICD-10-CM

## 2025-08-25 DIAGNOSIS — M25.512 CHRONIC LEFT SHOULDER PAIN: Primary | ICD-10-CM

## 2025-08-25 DIAGNOSIS — S46.812A STRAIN OF LEFT TRAPEZIUS MUSCLE, INITIAL ENCOUNTER: ICD-10-CM

## 2025-08-25 DIAGNOSIS — G89.29 CHRONIC LEFT SHOULDER PAIN: Primary | ICD-10-CM

## 2025-08-25 RX ADMIN — TRIAMCINOLONE ACETONIDE 80 MG: 40 INJECTION, SUSPENSION INTRA-ARTICULAR; INTRAMUSCULAR at 13:20

## 2025-08-25 RX ADMIN — LIDOCAINE HYDROCHLORIDE 2 ML: 10 INJECTION, SOLUTION EPIDURAL; INFILTRATION; INTRACAUDAL; PERINEURAL at 13:20

## 2025-08-26 ENCOUNTER — TELEPHONE (OUTPATIENT)
Dept: ORTHOPEDIC SURGERY | Facility: CLINIC | Age: 55
End: 2025-08-26
Payer: COMMERCIAL

## 2025-08-26 RX ORDER — TRIAMCINOLONE ACETONIDE 40 MG/ML
80 INJECTION, SUSPENSION INTRA-ARTICULAR; INTRAMUSCULAR
Status: COMPLETED | OUTPATIENT
Start: 2025-08-25 | End: 2025-08-25

## 2025-08-26 RX ORDER — LIDOCAINE HYDROCHLORIDE 10 MG/ML
2 INJECTION, SOLUTION EPIDURAL; INFILTRATION; INTRACAUDAL; PERINEURAL
Status: COMPLETED | OUTPATIENT
Start: 2025-08-25 | End: 2025-08-25